# Patient Record
Sex: MALE | Race: WHITE | NOT HISPANIC OR LATINO | Employment: OTHER | URBAN - METROPOLITAN AREA
[De-identification: names, ages, dates, MRNs, and addresses within clinical notes are randomized per-mention and may not be internally consistent; named-entity substitution may affect disease eponyms.]

---

## 2017-12-22 ENCOUNTER — TRANSCRIBE ORDERS (OUTPATIENT)
Dept: ADMINISTRATIVE | Facility: HOSPITAL | Age: 67
End: 2017-12-22

## 2017-12-22 DIAGNOSIS — R06.83 SNORES: Primary | ICD-10-CM

## 2021-12-03 ENCOUNTER — EVALUATION (OUTPATIENT)
Dept: PHYSICAL THERAPY | Facility: CLINIC | Age: 71
End: 2021-12-03
Payer: MEDICARE

## 2021-12-03 DIAGNOSIS — G20 PARKINSON'S DISEASE (HCC): Primary | ICD-10-CM

## 2021-12-03 DIAGNOSIS — R53.1 WEAKNESS: ICD-10-CM

## 2021-12-03 PROCEDURE — 97163 PT EVAL HIGH COMPLEX 45 MIN: CPT | Performed by: PHYSICAL THERAPIST

## 2021-12-07 ENCOUNTER — OFFICE VISIT (OUTPATIENT)
Dept: PHYSICAL THERAPY | Facility: CLINIC | Age: 71
End: 2021-12-07
Payer: MEDICARE

## 2021-12-07 DIAGNOSIS — G20 PARKINSON'S DISEASE (HCC): Primary | ICD-10-CM

## 2021-12-07 PROCEDURE — 97112 NEUROMUSCULAR REEDUCATION: CPT | Performed by: PHYSICAL THERAPIST

## 2021-12-10 ENCOUNTER — OFFICE VISIT (OUTPATIENT)
Dept: PHYSICAL THERAPY | Facility: CLINIC | Age: 71
End: 2021-12-10
Payer: MEDICARE

## 2021-12-10 DIAGNOSIS — G20 PARKINSON'S DISEASE (HCC): Primary | ICD-10-CM

## 2021-12-10 DIAGNOSIS — R53.1 WEAKNESS: ICD-10-CM

## 2021-12-10 PROCEDURE — 97112 NEUROMUSCULAR REEDUCATION: CPT | Performed by: PHYSICAL THERAPIST

## 2021-12-14 ENCOUNTER — APPOINTMENT (OUTPATIENT)
Dept: PHYSICAL THERAPY | Facility: CLINIC | Age: 71
End: 2021-12-14
Payer: MEDICARE

## 2021-12-17 ENCOUNTER — APPOINTMENT (OUTPATIENT)
Dept: PHYSICAL THERAPY | Facility: CLINIC | Age: 71
End: 2021-12-17
Payer: MEDICARE

## 2021-12-21 ENCOUNTER — APPOINTMENT (OUTPATIENT)
Dept: PHYSICAL THERAPY | Facility: CLINIC | Age: 71
End: 2021-12-21
Payer: MEDICARE

## 2021-12-23 ENCOUNTER — APPOINTMENT (OUTPATIENT)
Dept: PHYSICAL THERAPY | Facility: CLINIC | Age: 71
End: 2021-12-23
Payer: MEDICARE

## 2021-12-28 ENCOUNTER — APPOINTMENT (OUTPATIENT)
Dept: PHYSICAL THERAPY | Facility: CLINIC | Age: 71
End: 2021-12-28
Payer: MEDICARE

## 2021-12-30 ENCOUNTER — APPOINTMENT (OUTPATIENT)
Dept: PHYSICAL THERAPY | Facility: CLINIC | Age: 71
End: 2021-12-30
Payer: MEDICARE

## 2022-09-22 ENCOUNTER — OFFICE VISIT (OUTPATIENT)
Dept: OTOLARYNGOLOGY | Facility: CLINIC | Age: 72
End: 2022-09-22
Payer: MEDICARE

## 2022-09-22 VITALS — TEMPERATURE: 97.3 F | WEIGHT: 210 LBS | HEIGHT: 74 IN | BODY MASS INDEX: 26.95 KG/M2

## 2022-09-22 DIAGNOSIS — H93.13 TINNITUS OF BOTH EARS: ICD-10-CM

## 2022-09-22 DIAGNOSIS — H93.8X3 SENSATION OF PLUGGED EAR ON BOTH SIDES: Primary | ICD-10-CM

## 2022-09-22 PROCEDURE — 99204 OFFICE O/P NEW MOD 45 MIN: CPT | Performed by: OTOLARYNGOLOGY

## 2022-09-22 RX ORDER — POLYETHYLENE GLYCOL 3350 17 G/17G
17 POWDER, FOR SOLUTION ORAL DAILY
COMMUNITY

## 2022-09-22 RX ORDER — OXYBUTYNIN CHLORIDE 10 MG/1
10 TABLET, EXTENDED RELEASE ORAL DAILY
COMMUNITY
Start: 2022-09-01

## 2022-09-22 RX ORDER — ATORVASTATIN CALCIUM 10 MG/1
1 TABLET, FILM COATED ORAL DAILY
COMMUNITY
Start: 2022-07-16

## 2022-09-22 RX ORDER — LORATADINE 10 MG/1
10 TABLET ORAL DAILY
COMMUNITY

## 2022-09-22 RX ORDER — FINASTERIDE 5 MG/1
1 TABLET, FILM COATED ORAL DAILY
COMMUNITY
Start: 2022-09-18

## 2022-09-22 RX ORDER — PRUCALOPRIDE 2 MG/1
2 TABLET, FILM COATED ORAL
COMMUNITY

## 2022-09-22 RX ORDER — PANTOPRAZOLE SODIUM 40 MG/1
40 TABLET, DELAYED RELEASE ORAL DAILY
COMMUNITY

## 2022-09-22 RX ORDER — OMEPRAZOLE 20 MG/1
CAPSULE, DELAYED RELEASE ORAL
COMMUNITY
Start: 2022-07-20

## 2022-09-22 NOTE — LETTER
September 22, 2022     Elenita Rae MD  [address] [id number]    Patient: Daniel Duron   YOB: 1950   Date of Visit: 9/22/2022       Dear Dr Stanton Boothe: Thank you for referring Angel Mathew to me for evaluation  Below are my notes for this consultation  If you have questions, please do not hesitate to call me  I look forward to following your patient along with you  Sincerely,        Vipul Chowdary MD        CC: No Recipients  Vipul Chowdary MD  9/22/2022  9:29 AM  Sign when Signing Visit  Assessment/Plan:  Normal external exam; no cerumen nor fluid  Symptoms likely due to minor transient ETD, likely related to his Parkinson's Disease  Audiogram recommended to evaluate tinnitus; pt deferred  F/u PRN  No problem-specific Assessment & Plan notes found for this encounter  Diagnoses and all orders for this visit:    Sensation of plugged ear on both sides    Tinnitus of both ears    Other orders  -     atorvastatin (LIPITOR) 10 mg tablet; Take 1 tablet by mouth in the morning Take 1 tablet by mouth every day at bedtime for 90 days  -     carbidopa-levodopa (SINEMET)  mg per tablet; Take 2 tablets by mouth 3 (three) times a day  -     finasteride (PROSCAR) 5 mg tablet; Take 1 tablet by mouth in the morning  -     omeprazole (PriLOSEC) 20 mg delayed release capsule; TAKE 1 CAPSULE BY ORAL ROUTE EVERY DAY BEFORE MEALS  -     oxybutynin (DITROPAN-XL) 10 MG 24 hr tablet; Take 10 mg by mouth daily  -     Prucalopride Succinate (Motegrity) 2 MG TABS; Take 2 mg by mouth  -     pantoprazole (PROTONIX) 40 mg tablet; Take 40 mg by mouth daily  -     loratadine (CLARITIN) 10 mg tablet; Take 10 mg by mouth daily  -     polyethylene glycol (MIRALAX) 17 g packet; Take 17 g by mouth daily          Subjective:      Patient ID: Daniel Duron is a 67 y o  male  Pt c/o clogged ears, intermittently    He occasionally gets brief episodes of a clogged feeling in the ears, but it doesn't last more than a few minutes  He also notes a 30-year history of ringing in the ears  The following portions of the patient's history were reviewed and updated as appropriate: allergies, current medications, past family history, past medical history, past social history, past surgical history and problem list     Review of Systems      Objective:      Temp (!) 97 3 °F (36 3 °C) (Temporal)   Ht 6' 2" (1 88 m)   Wt 95 3 kg (210 lb)   BMI 26 96 kg/m²          Physical Exam  Constitutional:       Appearance: He is well-developed  HENT:      Head: Normocephalic and atraumatic  Right Ear: Tympanic membrane, ear canal and external ear normal  No drainage  No middle ear effusion  Left Ear: Tympanic membrane, ear canal and external ear normal  No drainage  No middle ear effusion  Nose: Nose normal       Mouth/Throat:      Pharynx: Uvula midline  No oropharyngeal exudate  Tonsils: 0 on the right  0 on the left  Neck:      Thyroid: No thyroid mass or thyromegaly  Trachea: Trachea normal  No tracheal deviation  Lymphadenopathy:      Cervical: No cervical adenopathy  Neurological:      Mental Status: He is alert

## 2022-09-22 NOTE — PROGRESS NOTES
Assessment/Plan:  Normal external exam; no cerumen nor fluid  Symptoms likely due to minor transient ETD, likely related to his Parkinson's Disease  Audiogram recommended to evaluate tinnitus; pt deferred  F/u PRN  No problem-specific Assessment & Plan notes found for this encounter  Diagnoses and all orders for this visit:    Sensation of plugged ear on both sides    Tinnitus of both ears    Other orders  -     atorvastatin (LIPITOR) 10 mg tablet; Take 1 tablet by mouth in the morning Take 1 tablet by mouth every day at bedtime for 90 days  -     carbidopa-levodopa (SINEMET)  mg per tablet; Take 2 tablets by mouth 3 (three) times a day  -     finasteride (PROSCAR) 5 mg tablet; Take 1 tablet by mouth in the morning  -     omeprazole (PriLOSEC) 20 mg delayed release capsule; TAKE 1 CAPSULE BY ORAL ROUTE EVERY DAY BEFORE MEALS  -     oxybutynin (DITROPAN-XL) 10 MG 24 hr tablet; Take 10 mg by mouth daily  -     Prucalopride Succinate (Motegrity) 2 MG TABS; Take 2 mg by mouth  -     pantoprazole (PROTONIX) 40 mg tablet; Take 40 mg by mouth daily  -     loratadine (CLARITIN) 10 mg tablet; Take 10 mg by mouth daily  -     polyethylene glycol (MIRALAX) 17 g packet; Take 17 g by mouth daily          Subjective:      Patient ID: Bobbi Kraus is a 67 y o  male  Pt c/o clogged ears, intermittently  He occasionally gets brief episodes of a clogged feeling in the ears, but it doesn't last more than a few minutes  He also notes a 30-year history of ringing in the ears        The following portions of the patient's history were reviewed and updated as appropriate: allergies, current medications, past family history, past medical history, past social history, past surgical history and problem list     Review of Systems      Objective:      Temp (!) 97 3 °F (36 3 °C) (Temporal)   Ht 6' 2" (1 88 m)   Wt 95 3 kg (210 lb)   BMI 26 96 kg/m²          Physical Exam  Constitutional:       Appearance: He is well-developed  HENT:      Head: Normocephalic and atraumatic  Right Ear: Tympanic membrane, ear canal and external ear normal  No drainage  No middle ear effusion  Left Ear: Tympanic membrane, ear canal and external ear normal  No drainage  No middle ear effusion  Nose: Nose normal       Mouth/Throat:      Pharynx: Uvula midline  No oropharyngeal exudate  Tonsils: 0 on the right  0 on the left  Neck:      Thyroid: No thyroid mass or thyromegaly  Trachea: Trachea normal  No tracheal deviation  Lymphadenopathy:      Cervical: No cervical adenopathy  Neurological:      Mental Status: He is alert

## 2022-11-18 ENCOUNTER — NEW PATIENT COMPREHENSIVE (OUTPATIENT)
Dept: URBAN - METROPOLITAN AREA CLINIC 48 | Facility: CLINIC | Age: 72
End: 2022-11-18

## 2022-11-18 DIAGNOSIS — H02.831: ICD-10-CM

## 2022-11-18 DIAGNOSIS — H52.13: ICD-10-CM

## 2022-11-18 DIAGNOSIS — H18.603: ICD-10-CM

## 2022-11-18 DIAGNOSIS — H43.812: ICD-10-CM

## 2022-11-18 DIAGNOSIS — H04.123: ICD-10-CM

## 2022-11-18 DIAGNOSIS — H02.834: ICD-10-CM

## 2022-11-18 DIAGNOSIS — H25.13: ICD-10-CM

## 2022-11-18 PROCEDURE — 92025 CPTRIZED CORNEAL TOPOGRAPHY: CPT

## 2022-11-18 PROCEDURE — 92015 DETERMINE REFRACTIVE STATE: CPT

## 2022-11-18 PROCEDURE — 99204 OFFICE O/P NEW MOD 45 MIN: CPT

## 2022-11-18 ASSESSMENT — VISUAL ACUITY
OS_CC: 20/50-1
OS_CC: 20/25
OS_SC: 20/300
OD_SC: 20/50-2
OD_PH: 20/25-1
OD_CC: 20/40-1
OD_CC: 20/25-1

## 2022-11-18 ASSESSMENT — KERATOMETRY
OD_AXISANGLE_DEGREES: 142
OS_AXISANGLE_DEGREES: 180
OD_K1POWER_DIOPTERS: 43.00
OD_K2POWER_DIOPTERS: 46.50
OS_AXISANGLE2_DEGREES: 90
OS_K1POWER_DIOPTERS: 41.50
OD_AXISANGLE2_DEGREES: 52
OS_K2POWER_DIOPTERS: 43.75

## 2022-11-18 ASSESSMENT — TONOMETRY
OS_IOP_MMHG: 12
OD_IOP_MMHG: 11

## 2022-12-01 ENCOUNTER — PROBLEM (OUTPATIENT)
Dept: URBAN - METROPOLITAN AREA CLINIC 48 | Facility: CLINIC | Age: 72
End: 2022-12-01

## 2022-12-01 DIAGNOSIS — H52.13: ICD-10-CM

## 2022-12-01 DIAGNOSIS — H53.2: ICD-10-CM

## 2022-12-01 DIAGNOSIS — H04.123: ICD-10-CM

## 2022-12-01 PROCEDURE — 92060 SENSORIMOTOR EXAMINATION: CPT

## 2022-12-01 PROCEDURE — 92014 COMPRE OPH EXAM EST PT 1/>: CPT

## 2022-12-01 PROCEDURE — 92015 DETERMINE REFRACTIVE STATE: CPT

## 2022-12-01 ASSESSMENT — KERATOMETRY
OD_K1POWER_DIOPTERS: 43.00
OD_AXISANGLE_DEGREES: 142
OS_AXISANGLE2_DEGREES: 90
OS_K2POWER_DIOPTERS: 43.75
OS_AXISANGLE_DEGREES: 180
OD_AXISANGLE2_DEGREES: 52
OS_K1POWER_DIOPTERS: 41.50
OD_K2POWER_DIOPTERS: 46.50

## 2022-12-01 ASSESSMENT — TONOMETRY
OS_IOP_MMHG: 12
OD_IOP_MMHG: 12

## 2022-12-01 ASSESSMENT — VISUAL ACUITY
OD_CC: 20/30-2
OS_CC: 20/50-1

## 2024-10-17 ENCOUNTER — OFFICE VISIT (OUTPATIENT)
Dept: OTOLARYNGOLOGY | Facility: CLINIC | Age: 74
End: 2024-10-17
Payer: MEDICARE

## 2024-10-17 ENCOUNTER — OFFICE VISIT (OUTPATIENT)
Dept: AUDIOLOGY | Facility: CLINIC | Age: 74
End: 2024-10-17
Payer: MEDICARE

## 2024-10-17 VITALS — BODY MASS INDEX: 25.84 KG/M2 | HEIGHT: 73 IN | TEMPERATURE: 97.9 F | WEIGHT: 195 LBS

## 2024-10-17 DIAGNOSIS — H91.93 DECREASED HEARING, BILATERAL: ICD-10-CM

## 2024-10-17 DIAGNOSIS — J34.2 DNS (DEVIATED NASAL SEPTUM): ICD-10-CM

## 2024-10-17 DIAGNOSIS — H69.03 PATULOUS EUSTACHIAN TUBE OF BOTH EARS: ICD-10-CM

## 2024-10-17 DIAGNOSIS — H90.3 SENSORINEURAL HEARING LOSS (SNHL) OF BOTH EARS: ICD-10-CM

## 2024-10-17 DIAGNOSIS — H90.3 SENSORINEURAL HEARING LOSS, BILATERAL: Primary | ICD-10-CM

## 2024-10-17 DIAGNOSIS — R09.81 CHRONIC NASAL CONGESTION: Primary | ICD-10-CM

## 2024-10-17 PROCEDURE — 92557 COMPREHENSIVE HEARING TEST: CPT | Performed by: AUDIOLOGIST

## 2024-10-17 PROCEDURE — 31231 NASAL ENDOSCOPY DX: CPT | Performed by: OTOLARYNGOLOGY

## 2024-10-17 PROCEDURE — 99214 OFFICE O/P EST MOD 30 MIN: CPT | Performed by: OTOLARYNGOLOGY

## 2024-10-17 PROCEDURE — 92567 TYMPANOMETRY: CPT | Performed by: AUDIOLOGIST

## 2024-10-17 RX ORDER — MIRABEGRON 50 MG/1
50 TABLET, FILM COATED, EXTENDED RELEASE ORAL DAILY
COMMUNITY

## 2024-10-17 RX ORDER — ALENDRONATE SODIUM 70 MG/1
TABLET ORAL
COMMUNITY
Start: 2024-10-09

## 2024-10-17 NOTE — LETTER
"October 17, 2024     Apolinar Carr MD  [address]  [address]  [facility] [address]    Patient: Femi Acosta   YOB: 1950   Date of Visit: 10/17/2024       Dear Dr. Carr:    Thank you for referring Femi Acosta to me for evaluation. Below are my notes for this consultation.    If you have questions, please do not hesitate to call me. I look forward to following your patient along with you.         Sincerely,        Parminder Agarwal MD        CC: No Recipients    Parminder Agarwal MD  10/17/2024 11:29 AM  Sign when Signing Visit  Assessment/Plan:  The patient's nasal congestion is mainly orthostatic.  I recommended elevating the head of the bed.  He has bilateral SNHL; Hearing Aid Evaluation ordered.  He likely has a patulous eustachian tube on the right, causing the intermittent echoing of voice.      Diagnosis ICD-10-CM Associated Orders   1. Chronic nasal congestion  R09.81       2. DNS (deviated nasal septum)  J34.2       3. Patulous eustachian tube of both ears  H69.03 Ambulatory referral to Audiology      4. Decreased hearing, bilateral  H91.93 Ambulatory referral to Audiology             Subjective:      Patient ID: Femi Acosta is a 74 y.o. male.    Pt with c/o worsening nasal congestion, especially during the night.  He has been using Neosynephrine nasal spray for relief.  He also c/o intermittently feeling a hollowness in his ears, associated with an echoing of his own voice.  He also c/o decreased hearing.        The following portions of the patient's history were reviewed and updated as appropriate: allergies, current medications, past family history, past medical history, past social history, past surgical history and problem list.    Review of Systems      Objective:      Temp 97.9 °F (36.6 °C) (Temporal)   Ht 6' 1\" (1.854 m)   Wt 88.5 kg (195 lb)   BMI 25.73 kg/m²          Physical Exam  Constitutional:       Appearance: He is well-developed.   HENT:      Head: " Normocephalic and atraumatic.      Right Ear: Tympanic membrane, ear canal and external ear normal. No drainage. No middle ear effusion.      Left Ear: Tympanic membrane, ear canal and external ear normal. No drainage.  No middle ear effusion.      Nose: Septal deviation present.      Mouth/Throat:      Pharynx: Uvula midline. No oropharyngeal exudate.      Tonsils: 0 on the right. 0 on the left.   Neck:      Thyroid: No thyroid mass or thyromegaly.      Trachea: Trachea normal. No tracheal deviation.   Lymphadenopathy:      Cervical: No cervical adenopathy.   Neurological:      Mental Status: He is alert.         Flexible Fiberoptic Nasal Endoscopy Procedure Note:  Indication:  nasal congestion  Verbal consent obtained.  Surgeon: Parminder Agarwal MD  Anesthesia: 4% lidocaine, oxymetazoline  Scope passed through nasal cavities bilaterally.  Nasopharynx: normal  Right Nasal Cavity:   Mucosa: normal   Secretions: clear  Left Nasal Cavity:   Mucosa: normal   Secretions: clear  Other findings = DNS  Patient tolerated procedure well without complications    Audiogram:  AD: mild to mod HF SNHL  AS: mild to mod HF SNHL  Tympanogram:  AD: type A  AS: type A

## 2024-10-17 NOTE — PROGRESS NOTES
ENT HEARING EVALUATION    Name:  Femi Acosta  :  1950  Age:  74 y.o.   MRN:  [id number]  Date of Evaluation: 10/17/24     HISTORY:    Reason for visit:  Difficulty hearing high frequency consonant sounds     Femi Acosta is being seen today for an evaluation of hearing as part of their ENT visit.     EVALUATION:    Otoscopy revealed clear canals bilaterally     Tympanometry:   Right Ear: Type A; normal middle ear pressure and static compliance    Left Ear: Type A; normal middle ear pressure and static compliance     Speech Audiometry:  Speech Reception (SRT)   Right Ear: 10 dB HL   Left Ear: 15 dB HL    Word Recognition Scores (WRS):  Right Ear: good (85 % correct)     Left Ear: good (90 % correct)   Stimuli: NU-6    Pure Tone Audiometry:  Conventional pure tone audiometry from 250 - 8000 Hz  was obtained with good reliability and revealed the following:     Right Ear:  Normal 250-1500 Hz sloping sharply from a mild to moderate SHL, 9394-1032 Hz    Left Ear:    Normal 250-2000 Hz sloping sharply to a moderate to severe SHL, 3047-7385 Hz     *see attached audiogram for configuration     IMPRESSIONS:   Discussed the results with the patient and his wife / the loss of the high frequencies was discussed / his wife does have a soft, high frequency voice with an accent.   We briefly discussed how this could impact his ability to hear her well.       RECOMMENDATIONS:  Follow up per Dr. Agarwal   Consider a Hearing aid evaluation to discuss options available / copies of audiograms provided to the patient's wife   Annual hearing evaluations for monitoring purposes     Barrera Isabel, CCC-A  Clinical Audiologist  [id number]  [phone]

## 2024-10-17 NOTE — PROGRESS NOTES
"Assessment/Plan:  The patient's nasal congestion is mainly orthostatic.  I recommended elevating the head of the bed.  He has bilateral SNHL; Hearing Aid Evaluation ordered.  He likely has a patulous eustachian tube on the right, causing the intermittent echoing of voice.      Diagnosis ICD-10-CM Associated Orders   1. Chronic nasal congestion  R09.81       2. DNS (deviated nasal septum)  J34.2       3. Patulous eustachian tube of both ears  H69.03 Ambulatory referral to Audiology      4. Decreased hearing, bilateral  H91.93 Ambulatory referral to Audiology             Subjective:      Patient ID: Femi Acosta is a 74 y.o. male.    Pt with c/o worsening nasal congestion, especially during the night.  He has been using Neosynephrine nasal spray for relief.  He also c/o intermittently feeling a hollowness in his ears, associated with an echoing of his own voice.  He also c/o decreased hearing.        The following portions of the patient's history were reviewed and updated as appropriate: allergies, current medications, past family history, past medical history, past social history, past surgical history and problem list.    Review of Systems      Objective:      Temp 97.9 °F (36.6 °C) (Temporal)   Ht 6' 1\" (1.854 m)   Wt 88.5 kg (195 lb)   BMI 25.73 kg/m²          Physical Exam  Constitutional:       Appearance: He is well-developed.   HENT:      Head: Normocephalic and atraumatic.      Right Ear: Tympanic membrane, ear canal and external ear normal. No drainage. No middle ear effusion.      Left Ear: Tympanic membrane, ear canal and external ear normal. No drainage.  No middle ear effusion.      Nose: Septal deviation present.      Mouth/Throat:      Pharynx: Uvula midline. No oropharyngeal exudate.      Tonsils: 0 on the right. 0 on the left.   Neck:      Thyroid: No thyroid mass or thyromegaly.      Trachea: Trachea normal. No tracheal deviation.   Lymphadenopathy:      Cervical: No cervical adenopathy. "   Neurological:      Mental Status: He is alert.         Flexible Fiberoptic Nasal Endoscopy Procedure Note:  Indication:  nasal congestion  Verbal consent obtained.  Surgeon: Parminder Agarwal MD  Anesthesia: 4% lidocaine, oxymetazoline  Scope passed through nasal cavities bilaterally.  Nasopharynx: normal  Right Nasal Cavity:   Mucosa: normal   Secretions: clear  Left Nasal Cavity:   Mucosa: normal   Secretions: clear  Other findings = DNS  Patient tolerated procedure well without complications    Audiogram:  AD: mild to mod HF SNHL  AS: mild to mod HF SNHL  Tympanogram:  AD: type A  AS: type A

## 2024-11-18 ENCOUNTER — ESTABLISHED COMPREHENSIVE EXAM (OUTPATIENT)
Dept: URBAN - METROPOLITAN AREA CLINIC 48 | Facility: CLINIC | Age: 74
End: 2024-11-18

## 2024-11-18 DIAGNOSIS — H40.012: ICD-10-CM

## 2024-11-18 DIAGNOSIS — H43.812: ICD-10-CM

## 2024-11-18 DIAGNOSIS — H50.10: ICD-10-CM

## 2024-11-18 DIAGNOSIS — H25.813: ICD-10-CM

## 2024-11-18 DIAGNOSIS — H02.831: ICD-10-CM

## 2024-11-18 DIAGNOSIS — H53.2: ICD-10-CM

## 2024-11-18 DIAGNOSIS — H02.834: ICD-10-CM

## 2024-11-18 DIAGNOSIS — H16.223: ICD-10-CM

## 2024-11-18 PROCEDURE — 92014 COMPRE OPH EXAM EST PT 1/>: CPT

## 2024-11-18 ASSESSMENT — VISUAL ACUITY
OD_CC: 20/70-1
OS_CC: 20/50
OD_CC: 20/30
OS_SC: 20/400
OS_PH: 20/50
OD_SC: 20/150
OS_CC: 20/70-1
OD_PH: 20/30-2

## 2024-11-18 ASSESSMENT — TONOMETRY
OS_IOP_MMHG: 12
OD_IOP_MMHG: 13

## 2024-11-18 ASSESSMENT — KERATOMETRY
OD_AXISANGLE2_DEGREES: 52
OS_K2POWER_DIOPTERS: 43.75
OS_AXISANGLE2_DEGREES: 90
OS_AXISANGLE_DEGREES: 180
OD_K2POWER_DIOPTERS: 46.50
OD_AXISANGLE_DEGREES: 142
OS_K1POWER_DIOPTERS: 41.50
OD_K1POWER_DIOPTERS: 43.00

## 2024-12-03 ENCOUNTER — OFFICE VISIT (OUTPATIENT)
Dept: AUDIOLOGY | Facility: CLINIC | Age: 74
End: 2024-12-03

## 2024-12-03 DIAGNOSIS — H90.3 SENSORINEURAL HEARING LOSS, BILATERAL: Primary | ICD-10-CM

## 2024-12-03 NOTE — PROGRESS NOTES
Hearing Aid Evaluation  Name:  Femi Acosta  :  1950  Age:  74 y.o.  MRN:  [id number]  Date of Evaluation: 24     HISTORY:    Femi Acosta was seen today for a hearing aid evaluation following his audiometric testing performed on 10/17/2024. Femi was initially referred by Dr. Any EL.   He was accompanied today by his wife.  She discussed concerns regarding his difficulties hearing her voice and TV.   He is also diagnosed with Parkinson's.       RESULTS REVIEW:    The audiometric findings were reviewed with the patient and his wife. All of the patient's questions regarding his hearing status were addressed, and the importance of realistic expectations of hearing loss and amplification were discussed.   Central processing of speech and factors of distance and noise were also discussed at length.       DEVICE REVIEW & RECOMMENDATION:     Strengths and limitations of amplification, including various hearing aid styles, technology, options, and accessories were discussed at length with patient and his wife. Hearing aids are assistive devices and are not designed to restore normal hearing. The patient was counseled on the importance of self-advocacy, motivation, as well as effective communication strategies that can be used to optimize hearing aid success. Based on the degree of his hearing loss, preferences, and lifestyle needs, the following hearing recommendations were made:    The first hearing aid recommendation is Oticon Zircon 2 mini R ($1500).  The second hearing aid recommendation is Oticon Intent 4 mini R ($3200).    The patient demo'd, in office, the Intent 1 devices.   It was discussed, at length, the differences in tech level, channels and sound quality of the 24 channel vs. The 10 or 12 channels of the other devices discussed.  He was able to tell the difference with the aids on vs. Off.       [facility]'s office policies regarding our hearing aid program were reviewed, including the 45  "day trial period, non-refundable return fees, as well as the  warranties and service plan. Hearing aid cost, and payment, as well as insurance benefit (if applicable) were discussed with the patient.     DEVICE SELECTION:    At this time, the couple wishes to defer the purchase of hearing aids.   Mrs. Acosta wishes to check into different service models and \"try other devices\".       They will be traveling to [facility], in January, for 3 months.   The quote sheet, provided to them, is valid until 6/3/2025.       Barrera Isabel, CCC-A  Clinical Audiologist  [id number]  [facility] AUDIOLOG[facility]  [address] [facility] [address]  "

## 2024-12-12 ENCOUNTER — EVALUATION (OUTPATIENT)
Facility: CLINIC | Age: 74
End: 2024-12-12
Payer: MEDICARE

## 2024-12-12 DIAGNOSIS — G20.A1 PARKINSON'S DISEASE WITHOUT FLUCTUATING MANIFESTATIONS, UNSPECIFIED WHETHER DYSKINESIA PRESENT (HCC): Primary | ICD-10-CM

## 2024-12-12 PROCEDURE — 97166 OT EVAL MOD COMPLEX 45 MIN: CPT

## 2024-12-12 NOTE — LETTER
2024    Apolinar Carr MD  [address]  [address]  [facility] [address]    Patient: Femi Acosta   YOB: 1950   Date of Visit: 2024     Encounter Diagnosis     ICD-10-CM    1. Parkinson's disease without fluctuating manifestations, unspecified whether dyskinesia present (HCC)  G20.A1           Dear Dr. Carr:    Thank you for your recent referral of Femi Acosta. Please review the attached evaluation summary from Femi's recent visit.     Please verify that you agree with the plan of care by signing the attached order.     If you have any questions or concerns, please do not hesitate to call.     I sincerely appreciate the opportunity to share in the care of one of your patients and hope to have another opportunity to work with you in the near future.     Sincerely,    Osmar Jarvis, OT      Referring Provider:     I certify that I have read the below Plan of Care and certify the need for these services furnished under this plan of treatment while under my care.                    Apolinar Carr MD  [address]  [address]  [facility] [address]  Via Fax: [phone]          Today's Date: 2024  Patient Name: Femi Acosta  : 1950  MRN: [id number]  Referring Provider: No ref. provider found  Dx: Parkinson's disease without fluctuating manifestations, unspecified whether dyskinesia present (HCC) [G20.A1]    Active Problem List: There is no problem list on file for this patient.    Past Medical Hx:   Past Medical History:   Diagnosis Date   • Allergic rhinitis    • Constipation    • GERD (gastroesophageal reflux disease)    • Hyperlipidemia    • Overactive bladder    • Parkinson disease (HCC)    • Prostate enlargement      Past Surgical Hx:   Past Surgical History:   Procedure Laterality Date   • ANKLE FRACTURE SURGERY     • CYST REMOVAL            Eval/ Re-eval POC expires FOTO Auth #/ Referral # Total units  Start date  Expiration date Extension                                                              Date               Units:  Used               Authed:  Remaining                   SKILLED ANALYSIS:  Pt is a 74 y.o. male referred to Occupational Therapy with diagnosis of Parkinson's disease without fluctuating manifestations, unspecified whether dyskinesia present (HCC) [G20.A1].  Pt completed the following assessments today: ROM, MMT, MoCA, 9-hole peg test, and  strength testing. Pt presents with bradykinesia and rigidity due to effects of Parkinson's Disease and demonstrating difficulty in completing ADLs IADLs, lawn care, and leisure activities. Pt demonstrating the following impairments: decreased UE strength, UE coordination, mobility, FMC, and functional cognition and participating in his life tasks. Pt would benefit from OT services focusing on impairments for the next 12 weeks. Pt educated on OT services and     Please complete physical performance test in future session. Assess functional endurance required for RSB program, which the pt is very interested in.     Subjective    Occupational Profile:   Pt is a 74 year old retired . He enjoys to work outside and garden. Currently the pt having difficulty with writing, putting on clothing, working in his garden, performing lawn work and computer/cell phone usage. Over the past year or two the pt also states he has had more difficulty maintaining a conversation, as he loses his train of thought easily.    PATIENT GOAL: Improve writing, endurance, cognition, engage in RSB program.     HISTORY OF PRESENT ILLNESS:     Pt is a 74 y.o. male who was referred to Occupational Therapy s/p  Parkinson's disease without fluctuating manifestations, unspecified whether dyskinesia present (HCC) [G20.A1].     PMH:   Past Medical History:   Diagnosis Date   • Allergic rhinitis    • Constipation    • GERD (gastroesophageal reflux disease)    • Hyperlipidemia    • Overactive bladder    • Parkinson disease (HCC)    •  Prostate enlargement        Past Surgical Hx:   Past Surgical History:   Procedure Laterality Date   • ANKLE FRACTURE SURGERY     • CYST REMOVAL       PLAN OF CARE START:24  PLAN OF CARE END: 3/13/25  FREQUENCY: 2-3x/week  Precautions: FALL SAFETY,     OBJECTIVE   9 HOLE PEG TEST: performed 9 hole peg test to assess dexterity/fine motor coordination with pt scoring 108 seconds on R hand  and 90 seconds on L hand  side. Pt demonstrating decreased FMC related to age-related norms     [place] Cognitive Assessment Version 8.1 (MoCA V8.1)  Visuospatial/executive functionin/5  Namin/3  Memory: 1st trial:  , 2nd trial:  3/5  Attention/concentration:   List of letters: 0/1  Seial Seven Subtraction:  2/3   Language/sentence repetition:    Language Fluency:  0/1  Abstract/Correlational Thinkin  Delayed Recall:    Orientation:                Memory Index Score: 8/15  MoCA V1 8.1 Raw Score:  16/30, MIS:  8/15, indicative of MODERATE neurocognitive impairments.          UE Strength:              MALCOM: RUE 92 lbs LUE: 95 lbs  The age norm is approximately 55-65 lbs and indicating normal  strength  Pt is R hand dominant      Range of Motion:  AROM:   BUE within normal limits     MMT:  R UE: 4+/5 in all pivots      L UE 4+/5 in all pivots      Pt is L hand dominant    GOALS:   Short Term Goals: 4 weeks   Pt will increase delayed recall and recall 2/5 items on MOCA to complete IADLs  Pt will demonstrate improved functional cognition as evidenced by improving score on the MoCA to 19/30 to improve performance during ADLs and IADLs  Pt will increase FMC by 10 seconds bilaterally on 9 hole peg to complete ADLs and IADLs   Pt will demonstrate independence with UE strengthening HEP as per patient report    Long Term Goals: 3 months   Pt will improve functional endurance to engage in daily tasks or yard works without complaints of fatigue.   Pt will will improve social participation and  engage in the RSB program on a weekly basis to assist with functional mobility and endurance.   Pt will improve performance with fasteners for clothing management as per patient report.  Pt will improve UE strength to 5/5 in all planes to improve performance with ADLs/IADLs

## 2024-12-12 NOTE — LETTER
2024    Apolinar Carr MD  [address]  [address]  [facility] [address]    Patient: Femi Acosta   YOB: 1950   Date of Visit: 2024     Encounter Diagnosis     ICD-10-CM    1. Parkinson's disease without fluctuating manifestations, unspecified whether dyskinesia present (HCC)  G20.A1           Dear Dr. Carr:    Thank you for your recent referral of Femi Acosta. Please review the attached evaluation summary from Femi's recent visit.     Please verify that you agree with the plan of care by signing the attached order.     If you have any questions or concerns, please do not hesitate to call.     I sincerely appreciate the opportunity to share in the care of one of your patients and hope to have another opportunity to work with you in the near future.     Sincerely,    Osmar Jarvis, OT      Referring Provider:     I certify that I have read the below Plan of Care and certify the need for these services furnished under this plan of treatment while under my care.                    Apolinar Carr MD  [address]  [address]  [facility] [address]  Via Fax: [phone]          Today's Date: 2024  Patient Name: Femi Acosta  : 1950  MRN: [id number]  Referring Provider: No ref. provider found  Dx: Parkinson's disease without fluctuating manifestations, unspecified whether dyskinesia present (HCC) [G20.A1]    Active Problem List: There is no problem list on file for this patient.    Past Medical Hx:   Past Medical History:   Diagnosis Date   • Allergic rhinitis    • Constipation    • GERD (gastroesophageal reflux disease)    • Hyperlipidemia    • Overactive bladder    • Parkinson disease (HCC)    • Prostate enlargement      Past Surgical Hx:   Past Surgical History:   Procedure Laterality Date   • ANKLE FRACTURE SURGERY     • CYST REMOVAL            Eval/ Re-eval POC expires FOTO Auth #/ Referral # Total units  Start date  Expiration date Extension                                                              Date               Units:  Used               Authed:  Remaining                   SKILLED ANALYSIS:  Pt is a 74 y.o. male referred to Occupational Therapy with diagnosis of Parkinson's disease without fluctuating manifestations, unspecified whether dyskinesia present (HCC) [G20.A1].  Pt completed the following assessments today: ROM, MMT, MoCA, 9-hole peg test, and  strength testing. Pt presents with bradykinesia and rigidity due to effects of Parkinson's Disease and demonstrating difficulty in completing ADLs IADLs, lawn care, and leisure activities. Pt demonstrating the following impairments: decreased UE strength, UE coordination, mobility, FMC, and functional cognition and participating in his life tasks. Pt would benefit from OT services focusing on impairments for the next 12 weeks. Pt educated on OT services and     Please complete physical performance test in future session. Assess functional endurance required for RSB program, which the pt is very interested in.     Subjective    Occupational Profile:   Pt is a 74 year old retired . He enjoys to work outside and garden. Currently the pt having difficulty with writing, putting on clothing, working in his garden, performing lawn work and computer/cell phone usage. Over the past year or two the pt also states he has had more difficulty maintaining a conversation, as he loses his train of thought easily.    PATIENT GOAL: Improve writing, endurance, cognition, engage in RSB program.     HISTORY OF PRESENT ILLNESS:     Pt is a 74 y.o. male who was referred to Occupational Therapy s/p  Parkinson's disease without fluctuating manifestations, unspecified whether dyskinesia present (HCC) [G20.A1].     PMH:   Past Medical History:   Diagnosis Date   • Allergic rhinitis    • Constipation    • GERD (gastroesophageal reflux disease)    • Hyperlipidemia    • Overactive bladder    • Parkinson disease (HCC)    •  Prostate enlargement        Past Surgical Hx:   Past Surgical History:   Procedure Laterality Date   • ANKLE FRACTURE SURGERY     • CYST REMOVAL       PLAN OF CARE START:24  PLAN OF CARE END: 3/13/25  FREQUENCY: 2x/week  Precautions: FALL SAFETY,     OBJECTIVE   9 HOLE PEG TEST: performed 9 hole peg test to assess dexterity/fine motor coordination with pt scoring 108 seconds on R hand  and 90 seconds on L hand  side. Pt demonstrating decreased FMC related to age-related norms     [place] Cognitive Assessment Version 8.1 (MoCA V8.1)  Visuospatial/executive functionin/5  Namin/3  Memory: 1st trial:  , 2nd trial:  3/5  Attention/concentration:   List of letters: 0/1  Seial Seven Subtraction:  2/3   Language/sentence repetition:    Language Fluency:  0/1  Abstract/Correlational Thinkin/2  Delayed Recall:    Orientation:                Memory Index Score: 8/15  MoCA V1 8.1 Raw Score:  16/30, MIS:  8/15, indicative of MODERATE neurocognitive impairments.          UE Strength:              MALCOM: RUE 92 lbs LUE: 95 lbs  The age norm is approximately 55-65 lbs and indicating normal  strength  Pt is R hand dominant      Range of Motion:  AROM:   BUE within normal limits     MMT:  R UE: 4+/5 in all pivots      L UE 4+/5 in all pivots      Pt is L hand dominant    GOALS:   Short Term Goals: 4 weeks   Pt will increase delayed recall and recall 2/5 items on MOCA to complete IADLs  Pt will demonstrate improved functional cognition as evidenced by improving score on the MoCA to 19/30 to improve performance during ADLs and IADLs  Pt will increase FMC by 10 seconds bilaterally on 9 hole peg to complete ADLs and IADLs   Pt will demonstrate independence with UE strengthening HEP as per patient report    Long Term Goals: 3 months   Pt will improve functional endurance to engage in daily tasks or yard works without complaints of fatigue.   Pt will will improve social participation and  engage in the RSB program on a weekly basis to assist with functional mobility and endurance.   Pt will improve performance with fasteners for clothing management as per patient report.  Pt will improve UE strength to 5/5 in all planes to improve performance with ADLs/IADLs

## 2024-12-12 NOTE — PROGRESS NOTES
Today's Date: 2024  Patient Name: Femi Acosta  : 1950  MRN: [id number]  Referring Provider: No ref. provider found  Dx: Parkinson's disease without fluctuating manifestations, unspecified whether dyskinesia present (HCC) [G20.A1]    Active Problem List: There is no problem list on file for this patient.    Past Medical Hx:   Past Medical History:   Diagnosis Date    Allergic rhinitis     Constipation     GERD (gastroesophageal reflux disease)     Hyperlipidemia     Overactive bladder     Parkinson disease (HCC)     Prostate enlargement      Past Surgical Hx:   Past Surgical History:   Procedure Laterality Date    ANKLE FRACTURE SURGERY      CYST REMOVAL            Eval/ Re-eval POC expires FOTO Auth #/ Referral # Total units  Start date  Expiration date Extension                                                             Date               Units:  Used               Authed:  Remaining                   SKILLED ANALYSIS:  Pt is a 74 y.o. male referred to Occupational Therapy with diagnosis of Parkinson's disease without fluctuating manifestations, unspecified whether dyskinesia present (HCC) [G20.A1].  Pt completed the following assessments today: ROM, MMT, MoCA, 9-hole peg test, and  strength testing. Pt presents with bradykinesia and rigidity due to effects of Parkinson's Disease and demonstrating difficulty in completing ADLs IADLs, lawn care, and leisure activities. Pt demonstrating the following impairments: decreased UE strength, UE coordination, mobility, FMC, and functional cognition and participating in his life tasks. Pt would benefit from OT services focusing on impairments for the next 12 weeks. Pt educated on OT services and     Please complete physical performance test in future session. Assess functional endurance required for RSB program, which the pt is very interested in.     Subjective    Occupational Profile:   Pt is a 74 year old retired . He enjoys to work  outside and garden. Currently the pt having difficulty with writing, putting on clothing, working in his garden, performing lawn work and computer/cell phone usage. Over the past year or two the pt also states he has had more difficulty maintaining a conversation, as he loses his train of thought easily.    PATIENT GOAL: Improve writing, endurance, cognition, engage in RSB program.     HISTORY OF PRESENT ILLNESS:     Pt is a 74 y.o. male who was referred to Occupational Therapy s/p  Parkinson's disease without fluctuating manifestations, unspecified whether dyskinesia present (HCC) [G20.A1].     PMH:   Past Medical History:   Diagnosis Date    Allergic rhinitis     Constipation     GERD (gastroesophageal reflux disease)     Hyperlipidemia     Overactive bladder     Parkinson disease (HCC)     Prostate enlargement        Past Surgical Hx:   Past Surgical History:   Procedure Laterality Date    ANKLE FRACTURE SURGERY  1975    CYST REMOVAL       PLAN OF CARE START:24  PLAN OF CARE END: 3/13/25  FREQUENCY: 2-3x/week  Precautions: FALL SAFETY,     OBJECTIVE   9 HOLE PEG TEST: performed 9 hole peg test to assess dexterity/fine motor coordination with pt scoring 108 seconds on R hand  and 90 seconds on L hand  side. Pt demonstrating decreased FMC related to age-related norms     [place] Cognitive Assessment Version 8.1 (MoCA V8.1)  Visuospatial/executive functionin/5  Namin/3  Memory: 1st trial:  4/5, 2nd trial:  3/5  Attention/concentration: 2/2  List of letters: 0/1  Seial Seven Subtraction:  2/3   Language/sentence repetition:  1/2  Language Fluency:  0/1  Abstract/Correlational Thinkin/2  Delayed Recall:  1/5  Orientation:  5/6              Memory Index Score: 15  MoCA V1 8.1 Raw Score:  16/30, MIS:  8/15, indicative of MODERATE neurocognitive impairments.          UE Strength:              MALCOM: RUE 92 lbs LUE: 95 lbs  The age norm is approximately 55-65 lbs and indicating normal   strength  Pt is R hand dominant      Range of Motion:  AROM:   BUE within normal limits     MMT:  R UE: 4+/5 in all pivots      L UE 4+/5 in all pivots      Pt is L hand dominant    GOALS:   Short Term Goals: 4 weeks   Pt will increase delayed recall and recall 2/5 items on MOCA to complete IADLs  Pt will demonstrate improved functional cognition as evidenced by improving score on the MoCA to 19/30 to improve performance during ADLs and IADLs  Pt will increase FMC by 10 seconds bilaterally on 9 hole peg to complete ADLs and IADLs   Pt will demonstrate independence with UE strengthening HEP as per patient report    Long Term Goals: 3 months   Pt will improve functional endurance to engage in daily tasks or yard works without complaints of fatigue.   Pt will will improve social participation and engage in the RSB program on a weekly basis to assist with functional mobility and endurance.   Pt will improve performance with fasteners for clothing management as per patient report.  Pt will improve UE strength to 5/5 in all planes to improve performance with ADLs/IADLs

## 2024-12-13 ENCOUNTER — OFFICE VISIT (OUTPATIENT)
Facility: CLINIC | Age: 74
End: 2024-12-13
Payer: MEDICARE

## 2024-12-13 DIAGNOSIS — G20.A1 PARKINSON'S DISEASE WITHOUT FLUCTUATING MANIFESTATIONS, UNSPECIFIED WHETHER DYSKINESIA PRESENT (HCC): Primary | ICD-10-CM

## 2024-12-13 PROCEDURE — 97112 NEUROMUSCULAR REEDUCATION: CPT

## 2024-12-13 PROCEDURE — 97530 THERAPEUTIC ACTIVITIES: CPT

## 2024-12-13 PROCEDURE — 97110 THERAPEUTIC EXERCISES: CPT

## 2024-12-13 NOTE — PROGRESS NOTES
Daily Note     Today's date: 2024  Patient name: Femi Acosta  : 1950  MRN: [id number]  Referring provider: No ref. provider found  Dx:   Encounter Diagnosis   Name Primary?    Parkinson's disease without fluctuating manifestations, unspecified whether dyskinesia present (HCC) Yes       Start Time: 1020  Stop Time: 1103  Total time in clinic (min): 43 minutes  PLAN OF CARE START:24  PLAN OF CARE END: 3/13/25  FREQUENCY: 2x/week  Precautions: FALL SAFETY,     Subjective: Educated on RSB program and recommending cognitive rehab due to cogntive testing scores. Discussed 2-3 times per week with focus on RSB and cognition with session primary focus wlith pt and spouse in understanding and agreement. Spouse and pt will think about possibility to do 3 times per week.       Objective: See treatment below.    Performed TUG  TU.66  TUG Cog 10.16 with severe difficulty to complete dual tasking  TUG Carry 10.15   TUG Cutoff Scores:  Demetrius Rivera et al, 2011, MDC: 4.8 sec  Julio Cesar et al, 2011, Fallers: Meds ON: < 12.21 sec, OFF: 15.5 sec    30 second STS: 8 reps  Assessing LE strength and Endurance and age related norms >12 for men at 74 years old                                  Physical Performance Test             Time     Score   Write sentence 18   2   Simulated  eating 21.21 1    Lift book 3.7 (blue binder) 3     Jacket   32 1                                            Violetta 4.5 2                                        Turn 360 2 0                                    50 foot walk  16.2 3     9 point scale:   - 32-36= no impairment  - 25-32= mild   - 17-24 moderate  - < 17 = unable to function in community.  7 point scale:  - < 19.4 mild  - 32-36 not frail.      Completed FMC task and  skills task of Qbitz with moderate-max cues for probvlem solving      Assessment: Tolerated treatment fair. Pt demonstrating in session decreased LE strength, endurance and dual tasking. Pt would benefit from  continued OT services to address functional cognitive, endurance, LE strength, FMC and dexterity      Plan: Continued skilled OT per POC.      NEW GOALS 12/13/2024  STGs in 4-6 weeks   Pt will increase LE strength to complete 30 second STS with 9 reps for IADLs  Pt will increase dual tasking to complete TUG cog by 3-4 math equations in 10 seconds for IADLs  Pt will increase PPT to 15 points overall for IADLs    LTGs in 8-12 weeks  Pt will increase LE strength to complete 30 second STS with 10 reps for IADLs  Pt will increase dual tasking to complete TUG cog by 4 math equations in 9.7 seconds for IADLs  Pt will increase PPT to 17 points overall for IADLs

## 2024-12-17 ENCOUNTER — OFFICE VISIT (OUTPATIENT)
Facility: CLINIC | Age: 74
End: 2024-12-17
Payer: MEDICARE

## 2024-12-17 DIAGNOSIS — G20.A1 PARKINSON'S DISEASE WITHOUT FLUCTUATING MANIFESTATIONS, UNSPECIFIED WHETHER DYSKINESIA PRESENT (HCC): Primary | ICD-10-CM

## 2024-12-17 PROCEDURE — 97112 NEUROMUSCULAR REEDUCATION: CPT

## 2024-12-17 PROCEDURE — 97530 THERAPEUTIC ACTIVITIES: CPT

## 2024-12-17 NOTE — PROGRESS NOTES
"Daily Note     Today's date: 2024  Patient name: Femi Acosta  : 1950  MRN: [id number]  Referring provider: Apolinar Carr MD  Dx:   Encounter Diagnosis   Name Primary?    Parkinson's disease without fluctuating manifestations, unspecified whether dyskinesia present (HCC) Yes       Start Time: 1145  Stop Time: 1233  Total time in clinic (min): 48 minutes    PLAN OF CARE START:24  PLAN OF CARE END: 3/13/25  FREQUENCY: 2x/week  Precautions: FALL SAFETY,     Subjective: Educated on RSB program, LSVT program, exercise on PD, and cognition on PD x 2 times during sesson. Pt reported \"I think they are trying to lock me up in a nursing home.\" Pt educated on therapist's role and prevent further decline and optimize independence. Pt continued to have delusion and perseverated. Required moderate prompting and reassurance with fair effect. Provided pt with business card      Objective: See treatment below.     24 1200   MiniBEST   Sit to stand 2   Rise to toes 2   Stand on 1 leg  1   Compensatory stepping correction forward  1   Compensatory stepping correction backward 1   Compensatory stepping correction lateral 1   Stance (feet together) eyes open, firm surface 2   Stance (feet together) eyes closed, foam surface 2   Incline eyes closed 1   Change in gait speed 2   Walk with head turns horizontal  2   Walk with pivot turns  1   Step over obstacles 2   TUG 1   Total score 21/28       TA:   Completed crossword puzzle grid of making own puzzle focusing on EF skills and sustained attention required increased time for processing and moderate protmpgint     Assessment: Tolerated treatment fair. Pt demonstrating idecreased balance. Pt would benefit from continued OT services to address functional cognitive, endurance, LE strength, FMC and dexterity      Plan: Continued skilled OT per POC.      NEW GOALS 2024  STGs in 4-6 weeks   Pt will increase LE strength to complete 30 second STS with 9 reps for " IADLs  Pt will increase dual tasking to complete TUG cog by 3-4 math equations in 10 seconds for IADLs  Pt will increase PPT to 15 points overall for IADLs    LTGs in 8-12 weeks  Pt will increase LE strength to complete 30 second STS with 10 reps for IADLs  Pt will increase dual tasking to complete TUG cog by 4 math equations in 9.7 seconds for IADLs  Pt will increase PPT to 17 points overall for IADLs    Pt will increase dynamic standing balance to 22/28 on minibest  for IADLs in 4 weeks  Pt will increase dynamic standing balance to 24/28 on minibest  for IADLs in 8-12 weeks

## 2024-12-19 ENCOUNTER — OFFICE VISIT (OUTPATIENT)
Facility: CLINIC | Age: 74
End: 2024-12-19
Payer: MEDICARE

## 2024-12-19 DIAGNOSIS — G20.A1 PARKINSON'S DISEASE WITHOUT FLUCTUATING MANIFESTATIONS, UNSPECIFIED WHETHER DYSKINESIA PRESENT (HCC): Primary | ICD-10-CM

## 2024-12-19 PROCEDURE — 97112 NEUROMUSCULAR REEDUCATION: CPT

## 2024-12-19 NOTE — PROGRESS NOTES
"Daily Note     Today's date: 2024  Patient name: Femi Acosta  : 1950  MRN: [id number]  Referring provider: No ref. provider found  Dx:   Encounter Diagnosis   Name Primary?    Parkinson's disease without fluctuating manifestations, unspecified whether dyskinesia present (HCC) Yes       Start Time: 1145  Stop Time: 1230  Total time in clinic (min): 45 minutes    PLAN OF CARE START:24  PLAN OF CARE END: 3/13/25  FREQUENCY: 2x/week  Precautions: FALL SAFETY,     Subjective: \"that was good\"      Objective: See treatment below.  Completed warm up exercises x 10 reps each:   WARM-UP  -10 large amplitude forward step with right foot  -10 large amplitude forward step with left foot  -10 large amplitude sidestep with right foot  -10 large amplitude sidestep with left foot     Shadow Boxing 10 reps each     Circuit 1 (2 min each round, 1 round, 30 sec rest break b/w rounds)  Cone weave with dual task stating states- moderate cues  X 5 STS with 1-6 combos  Lunges into bosu x 3 with 1-6 combos  Posterior resistance (yellow) monster squats with 1-6 combos      Assessment: Tolerated treatment fair. Pt demonstrating increased difficulty with dual tasking and required moderate prompting. Required min-moderate prompting for recall of combo sequence. Pt would benefit from continued OT services to address functional cognitive, endurance, LE strength, FMC and dexterity      Plan: Continued skilled OT per POC.      NEW GOALS 2024  STGs in 4-6 weeks   Pt will increase LE strength to complete 30 second STS with 9 reps for IADLs  Pt will increase dual tasking to complete TUG cog by 3-4 math equations in 10 seconds for IADLs  Pt will increase PPT to 15 points overall for IADLs    LTGs in 8-12 weeks  Pt will increase LE strength to complete 30 second STS with 10 reps for IADLs  Pt will increase dual tasking to complete TUG cog by 4 math equations in 9.7 seconds for IADLs  Pt will increase PPT to 17 points overall " for IADLs    Pt will increase dynamic standing balance to 22/28 on minibest  for IADLs in 4 weeks  Pt will increase dynamic standing balance to 24/28 on minibest  for IADLs in 8-12 weeks

## 2024-12-23 ENCOUNTER — OFFICE VISIT (OUTPATIENT)
Facility: CLINIC | Age: 74
End: 2024-12-23
Payer: MEDICARE

## 2024-12-23 DIAGNOSIS — G20.A1 PARKINSON'S DISEASE WITHOUT FLUCTUATING MANIFESTATIONS, UNSPECIFIED WHETHER DYSKINESIA PRESENT (HCC): Primary | ICD-10-CM

## 2024-12-23 PROCEDURE — 97530 THERAPEUTIC ACTIVITIES: CPT

## 2024-12-23 NOTE — PROGRESS NOTES
"Daily Note     Today's date: 2024  Patient name: Femi Acosta  : 1950  MRN: [id number]  Referring provider: Apolinar Carr MD  Dx:   Encounter Diagnosis   Name Primary?    Parkinson's disease without fluctuating manifestations, unspecified whether dyskinesia present (HCC) Yes       Start Time: 1715  Stop Time: 1800  Total time in clinic (min): 45 minutes    PLAN OF CARE START:24  PLAN OF CARE END: 3/13/25  FREQUENCY: 2x/week  Precautions: FALL SAFETY, color blind.     Subjective: \"that was tough\"       Objective: See treatment below.  TA:  Completeed multimatrix task of simple shapes and alphabet focusing on simple divided attention, working memory, FMC required max Vcs for keeping track on card, visual discrimination, memory of alphabet.   Completed matching game with visuospatial component focusing on  skills, sustained attention, EF skills. Requried moderate prompting      Assessment: Tolerated treatment fair. Pt demonstrating increased difficulty with memory and required increased time for porcessing. Pt would benefit from continued OT services to address functional cognitive, endurance, LE strength, FMC and dexterity      Plan: Continued skilled OT per POC. Go through memory strategies next session.       NEW GOALS 2024  STGs in 4-6 weeks   Pt will increase LE strength to complete 30 second STS with 9 reps for IADLs  Pt will increase dual tasking to complete TUG cog by 3-4 math equations in 10 seconds for IADLs  Pt will increase PPT to 15 points overall for IADLs    LTGs in 8-12 weeks  Pt will increase LE strength to complete 30 second STS with 10 reps for IADLs  Pt will increase dual tasking to complete TUG cog by 4 math equations in 9.7 seconds for IADLs  Pt will increase PPT to 17 points overall for IADLs    Pt will increase dynamic standing balance to 22/28 on minibest  for IADLs in 4 weeks  Pt will increase dynamic standing balance to 24/28 on minibest  for IADLs in 8-12 " weeks

## 2024-12-26 ENCOUNTER — OFFICE VISIT (OUTPATIENT)
Facility: CLINIC | Age: 74
End: 2024-12-26
Payer: MEDICARE

## 2024-12-26 DIAGNOSIS — G20.A1 PARKINSON'S DISEASE WITHOUT FLUCTUATING MANIFESTATIONS, UNSPECIFIED WHETHER DYSKINESIA PRESENT (HCC): Primary | ICD-10-CM

## 2024-12-26 PROCEDURE — 97530 THERAPEUTIC ACTIVITIES: CPT

## 2024-12-26 PROCEDURE — 97112 NEUROMUSCULAR REEDUCATION: CPT

## 2024-12-26 NOTE — PROGRESS NOTES
"Daily Note     Today's date: 2024  Patient name: Femi Acosta  : 1950  MRN: [id number]  Referring provider: Apolinar Carr MD  Dx:   Encounter Diagnosis   Name Primary?    Parkinson's disease without fluctuating manifestations, unspecified whether dyskinesia present (HCC) Yes         Start Time: 1021  Stop Time: 1102  Total time in clinic (min): 41 minutes    PLAN OF CARE START:24  PLAN OF CARE END: 3/13/25  FREQUENCY: 2x/week  Precautions: FALL SAFETY, color blind.     Subjective: \"This doesn't allow any time for daydreaming\" - in reference to complex array program on the BITS.     Objective: See treatment below.    NMR:   Completed the following exercises on the BITS with the pt wearing 2 lb wrist weights on each UE to provided proprioceptive input during amplified reaching exercises.   - BITS complex array with mubers 1-50 with 90% accuracy and taking 7 minutes to complete   - 3 rounds of BITS user paced tapping to work on attention, UE coordination.   - BITS recall exercise with images. 6 second flash, increasing, up to 6 words/images. 83% accurate on first trial and 63% accurate on the second trial.     TA:  -Completed simple qbitz puzzle with pt seated at table. Requires increased time and mod/max cueing from the therapist.     Assessment: Tolerated treatment fair. Pt demonstrating increased difficulty with memory and required increased time for porcessing. Pt would benefit from continued OT services to address functional cognitive, endurance, LE strength, FMC and dexterity      Plan: Continued skilled OT per POC. Go through memory strategies next session.       NEW GOALS 2024  STGs in 4-6 weeks   Pt will increase LE strength to complete 30 second STS with 9 reps for IADLs  Pt will increase dual tasking to complete TUG cog by 3-4 math equations in 10 seconds for IADLs  Pt will increase PPT to 15 points overall for IADLs    LTGs in 8-12 weeks  Pt will increase LE strength to complete " 30 second STS with 10 reps for IADLs  Pt will increase dual tasking to complete TUG cog by 4 math equations in 9.7 seconds for IADLs  Pt will increase PPT to 17 points overall for IADLs    Pt will increase dynamic standing balance to 22/28 on minibest  for IADLs in 4 weeks  Pt will increase dynamic standing balance to 24/28 on minibest  for IADLs in 8-12 weeks

## 2024-12-27 ENCOUNTER — TELEPHONE (OUTPATIENT)
Age: 74
End: 2024-12-27

## 2024-12-27 NOTE — TELEPHONE ENCOUNTER
Pt wife calling again looking to scheduled with Dr. Amaya in [facility], Advised of first avail pt wife declined and disconnected the call

## 2024-12-27 NOTE — TELEPHONE ENCOUNTER
Patients GI provider:       Number to return call: [phone]     Reason for call: Pt wife calling to schedule with Dr. Vincent verified he will be leaving [facility] and offered another provider, pt wife will check other providers and contact us back.    Scheduled procedure/appointment date if applicable:

## 2025-01-02 ENCOUNTER — OFFICE VISIT (OUTPATIENT)
Facility: CLINIC | Age: 75
End: 2025-01-02
Payer: MEDICARE

## 2025-01-02 ENCOUNTER — TELEPHONE (OUTPATIENT)
Dept: SPEECH THERAPY | Facility: CLINIC | Age: 75
End: 2025-01-02

## 2025-01-02 DIAGNOSIS — G20.A1 PARKINSON'S DISEASE WITHOUT FLUCTUATING MANIFESTATIONS, UNSPECIFIED WHETHER DYSKINESIA PRESENT (HCC): Primary | ICD-10-CM

## 2025-01-02 PROCEDURE — 97112 NEUROMUSCULAR REEDUCATION: CPT

## 2025-01-02 NOTE — TELEPHONE ENCOUNTER
Spoke to patient's significant other to schedule IE for speech therapy. Patient would like to wait to schedule this evaluation at this time, due to too many other therapy and doctor's appointments on his schedule, and will call back when he is ready.

## 2025-01-02 NOTE — PROGRESS NOTES
"Daily Note     Today's date: 2025  Patient name: Femi Acosta  : 1950  MRN: [id number]  Referring provider: Apolinar Carr MD  Dx:   Encounter Diagnosis   Name Primary?    Parkinson's disease without fluctuating manifestations, unspecified whether dyskinesia present (HCC) Yes                  PLAN OF CARE START:24  PLAN OF CARE END: 3/13/25  FREQUENCY: 2x/week  Precautions: FALL SAFETY,     Subjective: \"I need to get gloves\"      Objective: See treatment below.  Completed warm up exercises x 10 reps each:   WARM-UP  -10 large amplitude forward step with right foot  -10 large amplitude forward step with left foot  -10 large amplitude sidestep with right foot  -10 large amplitude sidestep with left foot     Shadow Boxing 20 reps each     Circuit 1 (2 min each round, 1 round, 30 sec rest break b/w rounds)  Step up on 8\" step- 1-6 combos  X 5 STS with 1-6 combos  Large step overs with 6\" with 1-6 combos  Side step>squat>side step over 6\" with 1-6 combos    Completed 4 mintues of 1-6 combos focusing on carryover of combos     Scheduled pt for month of January 3 times per week for RSB and cognitive rehab. Notified SLP for swallowing  Assessment: Tolerated treatment fair. Pt demonstrating increased difficulty with recall of combination sequence and Required moderate prompting for recall of combo sequence. Pt would benefit from continued OT services to address functional cognitive, endurance, LE strength, FMC and dexterity      Plan: Continued skilled OT per POC.      NEW GOALS 2024  STGs in 4-6 weeks   Pt will increase LE strength to complete 30 second STS with 9 reps for IADLs  Pt will increase dual tasking to complete TUG cog by 3-4 math equations in 10 seconds for IADLs  Pt will increase PPT to 15 points overall for IADLs    LTGs in 8-12 weeks  Pt will increase LE strength to complete 30 second STS with 10 reps for IADLs  Pt will increase dual tasking to complete TUG cog by 4 math equations in " 9.7 seconds for IADLs  Pt will increase PPT to 17 points overall for IADLs    Pt will increase dynamic standing balance to 22/28 on minibest  for IADLs in 4 weeks  Pt will increase dynamic standing balance to 24/28 on minibest  for IADLs in 8-12 weeks

## 2025-01-08 ENCOUNTER — EVALUATION (OUTPATIENT)
Facility: CLINIC | Age: 75
End: 2025-01-08
Payer: MEDICARE

## 2025-01-08 DIAGNOSIS — G20.A1 PARKINSON'S DISEASE WITHOUT FLUCTUATING MANIFESTATIONS, UNSPECIFIED WHETHER DYSKINESIA PRESENT (HCC): Primary | ICD-10-CM

## 2025-01-08 PROCEDURE — 97530 THERAPEUTIC ACTIVITIES: CPT

## 2025-01-08 NOTE — PROGRESS NOTES
OT Re-evaluation    Today's Date: 2025  Patient Name: Femi Acosta  : 1950  MRN: [id number]  Referring Provider: Apolinar Carr MD  Dx: Parkinson's disease without fluctuating manifestations, unspecified whether dyskinesia present (HCC) [G20.A1]    Active Problem List: There is no problem list on file for this patient.    Past Medical Hx:   Past Medical History:   Diagnosis Date    Allergic rhinitis     Constipation     GERD (gastroesophageal reflux disease)     Hyperlipidemia     Overactive bladder     Parkinson disease (HCC)     Prostate enlargement      Past Surgical Hx:   Past Surgical History:   Procedure Laterality Date    ANKLE FRACTURE SURGERY      CYST REMOVAL            Eval/ Re-eval POC expires FOTO Auth #/ Referral # Total units  Start date  Expiration date Extension                                                             Date               Units:  Used               Authed:  Remaining                   SKILLED ANALYSIS:  Pt presents to OP OT re-evaluation after 1 months of services in the setting of PD.  Pt reports he is unsure if he has noted improvements since start of POC.  He is motivated to participate in boxing, and is agreeable to also addressing other functional activities and functional cognition.  Based on assessments, pt is demonstrating mild (1 point) improvement on MoCA and significant (~20lb) decrease in b/l grasp strength.  Pt continues to demonstrate significant deficits in the following domains: overall functional cognition, sustained attn, divided attn, EF, , immediate and delayed visual recall, endurance, activity tolerance.  Recommend continued participation in OP OT services 2-3x/wk to maximize functioning and independence with daily activities.  Discussed results and recommendations with pt and his wife, both are in agreement.    In future session/re-eval, would benefit from MVPT based on performance during assessments today.    Subjective    Occupational  Profile:   Pt is a 74 year old retired . He enjoys to work outside and garden. Currently the pt having difficulty with writing, putting on clothing, working in his garden, performing lawn work and computer/cell phone usage. Over the past year or two the pt also states he has had more difficulty maintaining a conversation, as he loses his train of thought easily.    PATIENT GOAL: Improve writing, endurance, cognition, engage in RSB program.     HISTORY OF PRESENT ILLNESS:     Pt is a 74 y.o. male who was referred to Occupational Therapy s/p  Parkinson's disease without fluctuating manifestations, unspecified whether dyskinesia present (HCC) [G20.A1].     PMH:   Past Medical History:   Diagnosis Date    Allergic rhinitis     Constipation     GERD (gastroesophageal reflux disease)     Hyperlipidemia     Overactive bladder     Parkinson disease (HCC)     Prostate enlargement        Past Surgical Hx:   Past Surgical History:   Procedure Laterality Date    ANKLE FRACTURE SURGERY  1975    CYST REMOVAL       PLAN OF CARE START:24  PLAN OF CARE END: 3/13/25  FREQUENCY: 2-3x/week  Precautions: FALL SAFETY,     OBJECTIVE   9 HOLE PEG TEST: performed 9 hole peg test to assess dexterity/fine motor coordination with pt scoring 108 seconds on R hand  and 90 seconds on L hand  side. Pt demonstrating decreased FMC related to age-related norms     Trail Making Test A: 4:58 with 3 cues  Trail Making Test B: 6:30 to complete trhough E with 5 cues  Norms: A: 40.13 seconds, B: 86.27 seconds.  Scores imply severe deficits with sustained and divided attn, EF, working memory.  Of note, often reports the correct item in sequence aloud, but requires SIGNIFICANTLY inc time to locate.  Anticipate he has a figure ground deficit    Contextual Memory Test:   Immediate:    Delayed:     Matthew Cognitive Assessment Version 8.2 (MoCA V8.2)  Visuospatial/executive functionin/5  Naming:  3/3  Memory: 1st trial:  ,  "2nd trial:  3/5  Attention/concentration: 2  List of letters:   Seial Seven Subtraction:  2/3   Language/sentence repetition:    Language Fluency:  0  Abstract/Correlational Thinkin/2  Delayed Recall:  0  Orientation:                Memory Index Score: 8/15  MoCA V1 8.2 Raw Score:  17/30, MIS:  6/15, indicative of MODERATE neurocognitive impairments.          UE Strength:              MALCOM: RUE\" 72lb (previously 92 lbs) LUE: 74lb (previously 95 lbs)  The age norm is approximately 55-65 lbs and indicating normal  strength  Pt is R hand dominant      Range of Motion:  AROM:   BUE within normal limits     MMT: Not assessed 25  R UE: 4+/5 in all pivots      L UE 4+/5 in all pivots      Pt is L hand dominant    GOALS:   Short Term Goals: 4 weeks   Pt will increase delayed recall and recall 2/5 items on MOCA to complete IADLs  Pt will demonstrate improved functional cognition as evidenced by improving score on the MoCA to 19/30 to improve performance during ADLs and IADLs  Pt will increase FMC by 10 seconds bilaterally on 9 hole peg to complete ADLs and IADLs   Pt will demonstrate independence with UE strengthening HEP as per patient report    Long Term Goals: 3 months   Pt will improve functional endurance to engage in daily tasks or yard works without complaints of fatigue.   Pt will will improve social participation and engage in the RSB program on a weekly basis to assist with functional mobility and endurance.   Pt will improve performance with fasteners for clothing management as per patient report.  Pt will improve UE strength to 5/5 in all planes to improve performance with ADLs/IADLs      "

## 2025-01-09 ENCOUNTER — OFFICE VISIT (OUTPATIENT)
Facility: CLINIC | Age: 75
End: 2025-01-09
Payer: MEDICARE

## 2025-01-09 DIAGNOSIS — G20.A1 PARKINSON'S DISEASE WITHOUT FLUCTUATING MANIFESTATIONS, UNSPECIFIED WHETHER DYSKINESIA PRESENT (HCC): Primary | ICD-10-CM

## 2025-01-09 PROCEDURE — 97112 NEUROMUSCULAR REEDUCATION: CPT

## 2025-01-09 NOTE — PROGRESS NOTES
"Daily Note     Today's date: 2025  Patient name: Femi Acosta  : 1950  MRN: [id number]  Referring provider: Apolinar Carr MD  Dx:   Encounter Diagnosis   Name Primary?    Parkinson's disease without fluctuating manifestations, unspecified whether dyskinesia present (HCC) Yes                  PLAN OF CARE START:24  PLAN OF CARE END: 3/13/25  FREQUENCY: 2x/week  Precautions: FALL SAFETY,     Subjective: \"I got gloves\"      Objective: See treatment below.  Completed warm up exercises x 10 reps each:   WARM-UP  -10 large amplitude forward step with right foot  -10 large amplitude forward step with left foot  -10 large amplitude sidestep with right foot  -10 large amplitude sidestep with left foot     Shadow Boxing 20 reps each required cues for recall     Circuit 1 (2 min each round, 1 round, 30 sec rest break b/w rounds)  High knee walking with red posterior resistance - 1-6 combos  X 5 STS with 1-6 combos  Large step overs with 6\" hurdles with posterior resistance (red)  X 5 reps of STS with 1-6 combos    Provided pt with printout of combination sequence.   Educated spouse on PD and neurodegenerative nature and impact on cognition.    Assessment: Tolerated treatment fair. Pt demonstrating decreased recall for combination sequence. Required max cues in beginning and trialed stating combos outloud with good carryover.. Pt would benefit from continued OT services to address functional cognitive, endurance, LE strength, FMC and dexterity      Plan: Continued skilled OT per POC.      NEW GOALS 2024  STGs in 4-6 weeks   Pt will increase LE strength to complete 30 second STS with 9 reps for IADLs  Pt will increase dual tasking to complete TUG cog by 3-4 math equations in 10 seconds for IADLs  Pt will increase PPT to 15 points overall for IADLs    LTGs in 8-12 weeks  Pt will increase LE strength to complete 30 second STS with 10 reps for IADLs  Pt will increase dual tasking to complete TUG cog by 4 " math equations in 9.7 seconds for IADLs  Pt will increase PPT to 17 points overall for IADLs    Pt will increase dynamic standing balance to 22/28 on minibest  for IADLs in 4 weeks  Pt will increase dynamic standing balance to 24/28 on minibest  for IADLs in 8-12 weeks

## 2025-01-14 ENCOUNTER — TELEPHONE (OUTPATIENT)
Facility: CLINIC | Age: 75
End: 2025-01-14

## 2025-01-14 ENCOUNTER — OFFICE VISIT (OUTPATIENT)
Facility: CLINIC | Age: 75
End: 2025-01-14
Payer: MEDICARE

## 2025-01-14 DIAGNOSIS — G20.A1 PARKINSON'S DISEASE WITHOUT FLUCTUATING MANIFESTATIONS, UNSPECIFIED WHETHER DYSKINESIA PRESENT (HCC): Primary | ICD-10-CM

## 2025-01-14 PROCEDURE — 97530 THERAPEUTIC ACTIVITIES: CPT

## 2025-01-14 NOTE — PROGRESS NOTES
"Daily Note     Today's date: 2025  Patient name: Femi Acosta  : 1950  MRN: [id number]  Referring provider: Apolinar Carr MD  Dx:   Encounter Diagnosis   Name Primary?    Parkinson's disease without fluctuating manifestations, unspecified whether dyskinesia present (HCC) Yes                  PLAN OF CARE START:24  PLAN OF CARE END: 3/13/25  FREQUENCY: 2x/week  Precautions: FALL SAFETY,     Subjective: \"its going well\"      Objective: See treatment below.  TA:   Completed 13 piece wheel [place] puzzle focusing on  skills, EF skills, sustained attention, FMC and dexterity Required moderate prompting for problem solving due to decreased  skills  Completed trail making web only letters while stating foods using red pincher focusing on FMC, dexterity, sustained attention, memory,  required moderate-max cues for sequencing, problem solving letters.     Provided HEP of missing letter  Discussed transitioning to PT and SLP services here instead of [facility] end of feb.       Assessment: Tolerated treatment fair. Pt demonstrating decreased  skills, sustained atetntion and sequencing. Pt would benefit from continued OT services to address functional cognitive, endurance, LE strength, FMC and dexterity      Plan: Continued skilled OT per POC.      NEW GOALS 2024  STGs in 4-6 weeks   Pt will increase LE strength to complete 30 second STS with 9 reps for IADLs  Pt will increase dual tasking to complete TUG cog by 3-4 math equations in 10 seconds for IADLs  Pt will increase PPT to 15 points overall for IADLs    LTGs in 8-12 weeks  Pt will increase LE strength to complete 30 second STS with 10 reps for IADLs  Pt will increase dual tasking to complete TUG cog by 4 math equations in 9.7 seconds for IADLs  Pt will increase PPT to 17 points overall for IADLs    Pt will increase dynamic standing balance to 22/28 on minibest  for IADLs in 4 weeks  Pt will increase dynamic standing balance to 24/28 on " minibest  for IADLs in 8-12 weeks

## 2025-01-16 ENCOUNTER — OFFICE VISIT (OUTPATIENT)
Facility: CLINIC | Age: 75
End: 2025-01-16
Payer: MEDICARE

## 2025-01-16 DIAGNOSIS — G20.A1 PARKINSON'S DISEASE WITHOUT FLUCTUATING MANIFESTATIONS, UNSPECIFIED WHETHER DYSKINESIA PRESENT (HCC): Primary | ICD-10-CM

## 2025-01-16 PROCEDURE — 97112 NEUROMUSCULAR REEDUCATION: CPT

## 2025-01-16 NOTE — PROGRESS NOTES
Daily Note     Today's date: 2025  Patient name: Femi Acosta  : 1950  MRN: [id number]  Referring provider: Apolinar Carr MD  Dx:   Encounter Diagnosis   Name Primary?    Parkinson's disease without fluctuating manifestations, unspecified whether dyskinesia present (HCC) Yes                  PLAN OF CARE START:24  PLAN OF CARE END: 3/13/25  FREQUENCY: 2x/week  Precautions: FALL SAFETY,     Subjective: pt's spouse reports they are going to meet with neurologist specializing.       Objective: See treatment below.  Completed warm up exercises x 10 reps each:   WARM-UP  -10 large amplitude forward step with right foot  -10 large amplitude forward step with left foot  -10 large amplitude sidestep with right foot  -10 large amplitude sidestep with left foot     Shadow Boxing 20 reps each required cues for recall    Completed 1-6, 6-1 combos with pt able to carry over with min-mod cues for carryover- completed massed practice for carryover of combinations    Completed 3-4 sequence combinations with moderate prompting for recall and recall of UE coordination      Circuit 1 (2 min each round, 1 round, 30 sec rest break b/w rounds)  Blaze pod suicides with forward/backward walking  X 5 squats with tidal tank with 1-6 combos  Toe taps and blaze pods while stating cities focusing on dual tasking      Discussed cognitive impact on driving and spouse reprots she is primary  and they will be f/u with neurologist regarding driving in .     Assessment: Tolerated treatment fair. Pt demonstrating decreased recall for combination sequence and difficulty with dual tasking.  Pt would benefit from continued OT services to address functional cognitive, endurance, LE strength, FMC and dexterity      Plan: Continued skilled OT per POC.      NEW GOALS 2024  STGs in 4-6 weeks   Pt will increase LE strength to complete 30 second STS with 9 reps for IADLs  Pt will increase dual tasking to complete TUG cog by  3-4 math equations in 10 seconds for IADLs  Pt will increase PPT to 15 points overall for IADLs    LTGs in 8-12 weeks  Pt will increase LE strength to complete 30 second STS with 10 reps for IADLs  Pt will increase dual tasking to complete TUG cog by 4 math equations in 9.7 seconds for IADLs  Pt will increase PPT to 17 points overall for IADLs    Pt will increase dynamic standing balance to 22/28 on minibest  for IADLs in 4 weeks  Pt will increase dynamic standing balance to 24/28 on minibest  for IADLs in 8-12 weeks

## 2025-01-17 ENCOUNTER — OFFICE VISIT (OUTPATIENT)
Facility: CLINIC | Age: 75
End: 2025-01-17
Payer: MEDICARE

## 2025-01-17 DIAGNOSIS — G20.A1 PARKINSON'S DISEASE WITHOUT FLUCTUATING MANIFESTATIONS, UNSPECIFIED WHETHER DYSKINESIA PRESENT (HCC): Primary | ICD-10-CM

## 2025-01-17 PROCEDURE — 97530 THERAPEUTIC ACTIVITIES: CPT | Performed by: OCCUPATIONAL THERAPIST

## 2025-01-17 PROCEDURE — 97112 NEUROMUSCULAR REEDUCATION: CPT | Performed by: OCCUPATIONAL THERAPIST

## 2025-01-17 NOTE — PROGRESS NOTES
"Daily Note     Today's date: 2025  Patient name: Femi Acosta  : 1950  MRN: [id number]  Referring provider: Apolinar Carr MD  Dx:   Encounter Diagnosis   Name Primary?    Parkinson's disease without fluctuating manifestations, unspecified whether dyskinesia present (HCC) Yes       Start Time: 08  Stop Time: 1005  Total time in clinic (min): 78 minutes    PLAN OF CARE START:24  PLAN OF CARE END: 3/13/25  FREQUENCY: 2x/week  Precautions: FALL SAFETY,     Subjective: \"Is this to determine how intelligent I am?\" (when presented with maze worksheet)      Objective: See treatment below.  TA/NMR:  -Maze worksheets x3 in increasing complexity completed to address EF,  skills, FMC. Required extra time.   -Completed visual scanning/reaching activity with states index cards spread across mat, fields of 3 at a time and graded down to 2 at a time for a few rounds d/t max difficulty, with 1lb wrist weights donned focusing on standing balance, large amplitude reaching, sustained attention and immediate recall.  -Lengthy discussion regarding PD sxs, interdisciplinary management, POC and goals for maintenance vs slowing decline over time, review of pt's performance on cognitive and FM assessments from IE/RE, need for cues from spouse during activities at home d/t impaired attention. Also discussed driving and that safety is the biggest priority. Educated on needing Rx from a physician to complete fitness to drive evaluation here.   Pt's wife requested ideas for exercises pt can do for his hands.   -Provided list of fine motor activities using household items and resistive sponge (blue) and educated on performing gross grasp alternating with rotation in hand to address strength and FMC/dexterity.    Assessment: Tolerated treatment fair. Pt demonstrating difficulty recalling fields of 3 words, slow processing speed, bradykinesia, decreased insight into cognitive deficits.  Pt would benefit from continued OT " services to address functional cognition, endurance, LE strength, FMC and dexterity.      Plan: Continued skilled OT per POC.      NEW GOALS 12/13/2024  STGs in 4-6 weeks   Pt will increase LE strength to complete 30 second STS with 9 reps for IADLs  Pt will increase dual tasking to complete TUG cog by 3-4 math equations in 10 seconds for IADLs  Pt will increase PPT to 15 points overall for IADLs    LTGs in 8-12 weeks  Pt will increase LE strength to complete 30 second STS with 10 reps for IADLs  Pt will increase dual tasking to complete TUG cog by 4 math equations in 9.7 seconds for IADLs  Pt will increase PPT to 17 points overall for IADLs    Pt will increase dynamic standing balance to 22/28 on minibest  for IADLs in 4 weeks  Pt will increase dynamic standing balance to 24/28 on minibest  for IADLs in 8-12 weeks

## 2025-01-21 ENCOUNTER — APPOINTMENT (OUTPATIENT)
Facility: CLINIC | Age: 75
End: 2025-01-21
Payer: MEDICARE

## 2025-01-22 ENCOUNTER — APPOINTMENT (OUTPATIENT)
Facility: CLINIC | Age: 75
End: 2025-01-22
Payer: MEDICARE

## 2025-01-23 ENCOUNTER — OFFICE VISIT (OUTPATIENT)
Facility: CLINIC | Age: 75
End: 2025-01-23
Payer: MEDICARE

## 2025-01-23 DIAGNOSIS — G20.A1 PARKINSON'S DISEASE WITHOUT FLUCTUATING MANIFESTATIONS, UNSPECIFIED WHETHER DYSKINESIA PRESENT (HCC): Primary | ICD-10-CM

## 2025-01-23 PROCEDURE — 97530 THERAPEUTIC ACTIVITIES: CPT

## 2025-01-23 NOTE — PROGRESS NOTES
"Daily Note     Today's date: 2025  Patient name: Femi Acosta  : 1950  MRN: [id number]  Referring provider: Apolinar Carr MD  Dx:   Encounter Diagnosis   Name Primary?    Parkinson's disease without fluctuating manifestations, unspecified whether dyskinesia present (HCC) Yes         Start Time: 1015  Stop Time: 1100  Total time in clinic (min): 45 minutes    PLAN OF CARE START:24  PLAN OF CARE END: 3/13/25  FREQUENCY: 2x/week  Precautions: FALL SAFETY,     Subjective: Pts family member asked if they could transition care to [place] as this is closer for them, stated \"we will think about it.\"      Objective: See treatment below.    TA:  - with hand gripper at 25lbs Pt used amplified reach with crossing midline to obtain foam blocks with a naming component to work on divided attention and  strength  - multimatrix matching letters and numbers with naming component to work on alternating attention, problem solving requiring mod VC for recall of which letter is is on    Assessment: Tolerated treatment well. Difficulty with word finding during naming component of tasks today and poor divided attention. Pt demonstrating slow processing speed, bradykinesia, decreased insight into cognitive deficits.  Pt would benefit from continued OT services to address functional cognition, endurance, LE strength, FMC and dexterity.      Plan: Continued skilled OT per POC.      NEW GOALS 2024  STGs in 4-6 weeks   Pt will increase LE strength to complete 30 second STS with 9 reps for IADLs  Pt will increase dual tasking to complete TUG cog by 3-4 math equations in 10 seconds for IADLs  Pt will increase PPT to 15 points overall for IADLs    LTGs in 8-12 weeks  Pt will increase LE strength to complete 30 second STS with 10 reps for IADLs  Pt will increase dual tasking to complete TUG cog by 4 math equations in 9.7 seconds for IADLs  Pt will increase PPT to 17 points overall for IADLs    Pt will increase " dynamic standing balance to 22/28 on minibest  for IADLs in 4 weeks  Pt will increase dynamic standing balance to 24/28 on minibest  for IADLs in 8-12 weeks

## 2025-01-28 ENCOUNTER — OFFICE VISIT (OUTPATIENT)
Facility: CLINIC | Age: 75
End: 2025-01-28
Payer: MEDICARE

## 2025-01-28 DIAGNOSIS — G20.A1 PARKINSON'S DISEASE WITHOUT FLUCTUATING MANIFESTATIONS, UNSPECIFIED WHETHER DYSKINESIA PRESENT (HCC): Primary | ICD-10-CM

## 2025-01-28 PROCEDURE — 97112 NEUROMUSCULAR REEDUCATION: CPT

## 2025-01-28 NOTE — PROGRESS NOTES
Daily Note     Today's date: 2025  Patient name: Femi Acosta  : 1950  MRN: [id number]  Referring provider: Apolinar Carr MD  Dx:   Encounter Diagnosis   Name Primary?    Parkinson's disease without fluctuating manifestations, unspecified whether dyskinesia present (HCC) Yes         Start Time: 930  Stop Time: 1015  Total time in clinic (min): 45 minutes    PLAN OF CARE START:24  PLAN OF CARE END: 3/13/25  FREQUENCY: 2x/week  Precautions: FALL SAFETY,     Subjective: No new reports today.     Objective: See treatment below.    NMR:   Completed all exercises with 2 lb wrist weights on for proprioceptive input while reaching.   -Bean bag toss to target board in standing with pt alternating reaching across midline to retrieve bean bags   -In standing at mat - pt pushes all XL squigz onto wall, then pulls all off while alternating UE and crossing midline.   -In seated completed marble mosaic board with pt using code to place marbles with green clothes pin to . Pt alternating hands every row. Required min(A) from the therapist throughout for sequencing correctly.   -Completed pixie cubes puzzle with therapist setting up all sides of pieces due to color blindness. Pt able to complete 50% independently and required mod(A) for other pieces. FMC,  skills.     Assessment: Tolerated treatment well. Today's session focused on amplified movements and crossing midline in standing with functional activity tolerance. Poor insight to deficits during cognitive tasks today.  Pt demonstrating slow processing speed, bradykinesia, decreased insight into cognitive deficits.  Pt would benefit from continued OT services to address functional cognition, endurance, LE strength, FMC and dexterity.      Plan: Continued skilled OT per POC.      NEW GOALS 2024  STGs in 4-6 weeks   Pt will increase LE strength to complete 30 second STS with 9 reps for IADLs  Pt will increase dual tasking to complete TUG cog by  3-4 math equations in 10 seconds for IADLs  Pt will increase PPT to 15 points overall for IADLs    LTGs in 8-12 weeks  Pt will increase LE strength to complete 30 second STS with 10 reps for IADLs  Pt will increase dual tasking to complete TUG cog by 4 math equations in 9.7 seconds for IADLs  Pt will increase PPT to 17 points overall for IADLs    Pt will increase dynamic standing balance to 22/28 on minibest  for IADLs in 4 weeks  Pt will increase dynamic standing balance to 24/28 on minibest  for IADLs in 8-12 weeks

## 2025-01-29 ENCOUNTER — OFFICE VISIT (OUTPATIENT)
Facility: CLINIC | Age: 75
End: 2025-01-29
Payer: MEDICARE

## 2025-01-29 DIAGNOSIS — G20.A1 PARKINSON'S DISEASE WITHOUT FLUCTUATING MANIFESTATIONS, UNSPECIFIED WHETHER DYSKINESIA PRESENT (HCC): Primary | ICD-10-CM

## 2025-01-29 PROCEDURE — 97112 NEUROMUSCULAR REEDUCATION: CPT | Performed by: OCCUPATIONAL THERAPIST

## 2025-01-29 PROCEDURE — 97530 THERAPEUTIC ACTIVITIES: CPT | Performed by: OCCUPATIONAL THERAPIST

## 2025-01-29 NOTE — PROGRESS NOTES
Daily Note     Today's date: 2025  Patient name: Femi Acosta  : 1950  MRN: [id number]  Referring provider: Apolinar Carr MD  Dx:   Encounter Diagnosis   Name Primary?    Parkinson's disease without fluctuating manifestations, unspecified whether dyskinesia present (HCC) Yes           Start Time: 1019  Stop Time: 1100  Total time in clinic (min): 41 minutes    PLAN OF CARE START:24  PLAN OF CARE END: 3/13/25  FREQUENCY: 2x/week  Precautions: FALL SAFETY,     Subjective: No new reports today.     Objective: See treatment below.    TA/NMR:   -BITS visual scanning 50 number sequence with flashing central fixation, pt reaching across midline to tap each number to incorporate large amplitude movement. Required mod cues/prompts for 1, then improved to min cues intermittently for recall of location in sequence. Completed in 17:49 with 77% accuracy.   -Wooden sudoku setup focusing on FMC and sustained attention. Significantly increased time to complete ~25% of board. Pt/wife would like to try a sudoku puzzle next session.    Assessment: Tolerated treatment well. Today's session focused on amplified movements and crossing midline in standing, FMC, sustained attention.  Pt demonstrating slow processing speed, bradykinesia, decreased insight into cognitive deficits.  Pt would benefit from continued OT services to address functional cognition, endurance, LE strength, FMC and dexterity.      Plan: Continued skilled OT per POC.      NEW GOALS 2024  STGs in 4-6 weeks   Pt will increase LE strength to complete 30 second STS with 9 reps for IADLs  Pt will increase dual tasking to complete TUG cog by 3-4 math equations in 10 seconds for IADLs  Pt will increase PPT to 15 points overall for IADLs    LTGs in 8-12 weeks  Pt will increase LE strength to complete 30 second STS with 10 reps for IADLs  Pt will increase dual tasking to complete TUG cog by 4 math equations in 9.7 seconds for IADLs  Pt will increase  PPT to 17 points overall for IADLs    Pt will increase dynamic standing balance to 22/28 on minibest  for IADLs in 4 weeks  Pt will increase dynamic standing balance to 24/28 on minibest  for IADLs in 8-12 weeks

## 2025-01-30 ENCOUNTER — OFFICE VISIT (OUTPATIENT)
Facility: CLINIC | Age: 75
End: 2025-01-30
Payer: MEDICARE

## 2025-01-30 DIAGNOSIS — G20.A1 PARKINSON'S DISEASE WITHOUT FLUCTUATING MANIFESTATIONS, UNSPECIFIED WHETHER DYSKINESIA PRESENT (HCC): Primary | ICD-10-CM

## 2025-01-30 PROCEDURE — 97530 THERAPEUTIC ACTIVITIES: CPT

## 2025-01-30 PROCEDURE — 97112 NEUROMUSCULAR REEDUCATION: CPT

## 2025-01-30 NOTE — PROGRESS NOTES
"Daily Note     Today's date: 2025  Patient name: Femi Acosta  : 1950  MRN: [id number]  Referring provider: Apolinar Carr MD  Dx:   Encounter Diagnosis   Name Primary?    Parkinson's disease without fluctuating manifestations, unspecified whether dyskinesia present (HCC) Yes             Start Time: 930  Stop Time: 1020  Total time in clinic (min): 50 minutes    PLAN OF CARE START:24  PLAN OF CARE END: 3/13/25  FREQUENCY: 2x/week  Precautions: FALL SAFETY,     Subjective: Pts wife reports she is going to call the [place] office to switch care over there because it is closer to home and less busy environment, \"he will do better there\"    Objective: See treatment below.    TA/NMR:   - created words out of bananagrams provided between 3-5 letters long to work on sustained attention, abstraction, and FMC - required mod cues to start however faded to mod I  - marble mosaic completed to work on FMC/dexterity      Assessment: Tolerated treatment well. Pt demonstrated good FMC of tweezers with marbles today. Required increased time for processing  and mod cues at the beginning of each activity, but with repetition completed these with mod I. Pt demonstrating slow processing speed, bradykinesia, decreased insight into cognitive deficits.  Pt would benefit from continued OT services to address functional cognition, endurance, LE strength, FMC and dexterity.      Plan: Continued skilled OT per POC.      NEW GOALS 2024  STGs in 4-6 weeks   Pt will increase LE strength to complete 30 second STS with 9 reps for IADLs  Pt will increase dual tasking to complete TUG cog by 3-4 math equations in 10 seconds for IADLs  Pt will increase PPT to 15 points overall for IADLs    LTGs in 8-12 weeks  Pt will increase LE strength to complete 30 second STS with 10 reps for IADLs  Pt will increase dual tasking to complete TUG cog by 4 math equations in 9.7 seconds for IADLs  Pt will increase PPT to 17 points overall " for IADLs    Pt will increase dynamic standing balance to 22/28 on minibest  for IADLs in 4 weeks  Pt will increase dynamic standing balance to 24/28 on minibest  for IADLs in 8-12 weeks

## 2025-01-31 ENCOUNTER — TELEPHONE (OUTPATIENT)
Dept: GASTROENTEROLOGY | Facility: MEDICAL CENTER | Age: 75
End: 2025-01-31

## 2025-01-31 NOTE — TELEPHONE ENCOUNTER
Left voicemail and requested call back     Called patient to inform that for his appointment for 02/07 had to be rescheduled due to provider template change, I did reschedule him for 02/17 1:30PM  at [facility] with [facility], if he wants to be scheduled at the [facility] we are booking for March and April for new patients. He is placed on cancellation list as well. Asked patient to please call us if this scheduled appointment is ok.

## 2025-02-03 ENCOUNTER — EVALUATION (OUTPATIENT)
Facility: CLINIC | Age: 75
End: 2025-02-03
Payer: MEDICARE

## 2025-02-03 ENCOUNTER — TELEPHONE (OUTPATIENT)
Dept: GASTROENTEROLOGY | Facility: MEDICAL CENTER | Age: 75
End: 2025-02-03

## 2025-02-03 DIAGNOSIS — G20.A1 PARKINSON'S DISEASE WITHOUT FLUCTUATING MANIFESTATIONS, UNSPECIFIED WHETHER DYSKINESIA PRESENT (HCC): Primary | ICD-10-CM

## 2025-02-03 PROCEDURE — 97530 THERAPEUTIC ACTIVITIES: CPT

## 2025-02-03 NOTE — TELEPHONE ENCOUNTER
Pts wife called to advise that they do not want to be rescheduled with another doctor and that this would be the 2nd time they had to be rescheduled. Appt.s for Dr. Gant are not coming up until April. Pt is asking for a cb to discuss scheduling sooner with the

## 2025-02-03 NOTE — TELEPHONE ENCOUNTER
Called and spoke to patients wife to schedule follow up with Dr. Jaiyeola, patient has scheduled and asked not to reschedule again. Placed on cancellation list. Patient will keep 02/17 with Zachary, but if anything patient might cancel 02/17 and keep Dr. Jaiyeola appt.     Rescheduled Appointment  (Newest Message First)  View All Conversations on this Encounter   Daisy Sanon routed conversation to [facility] Clerical1 hour ago (12:52 PM)     Daisy Sanon1 hour ago (12:52 PM)     NS  Pts wife called to advise that they do not want to be rescheduled with another doctor and that this would be the 2nd time they had to be rescheduled. Appt.s for Dr. Gant are not coming up until April. Pt is asking for a cb to discuss scheduling sooner with the DR.          Note         You3 days ago     AV  Left voicemail and requested call back      Called patient to inform that for his appointment for 02/07 had to be rescheduled due to provider template change, I did reschedule him for 02/17 1:30PM  at [facility] with [facility], if he wants to be scheduled at the [facility] facility we are booking for March and April for new patients. He is placed on cancellation list as well. Asked patient to please call us if this scheduled appointment is ok.          Note         Recent Patient Communication     Last Update Description Specialty     Today Rescheduled Appointment Gastroenterology     [facility] Closed     2 weeks ago -- Occupational Therapy     [facility] Closed     1 month ago -- Speech Therapy     [facility] Closed     1 month ago Procedure/Surgery Scheduling Instructions Gastroenterology     [facility] Pod Rosmery [facility] Closed

## 2025-02-03 NOTE — PROGRESS NOTES
"OT Re-evaluation    Today's Date: 2/3/2025  Patient Name: Femi Acosta  : 1950  MRN: [id number]  Referring Provider: Apolinar Carr MD  Dx: Parkinson's disease without fluctuating manifestations, unspecified whether dyskinesia present (HCC) [G20.A1]    Active Problem List: There is no problem list on file for this patient.    Past Medical Hx:   Past Medical History:   Diagnosis Date    Allergic rhinitis     Constipation     GERD (gastroesophageal reflux disease)     Hyperlipidemia     Overactive bladder     Parkinson disease (HCC)     Prostate enlargement      Past Surgical Hx:   Past Surgical History:   Procedure Laterality Date    ANKLE FRACTURE SURGERY      CYST REMOVAL            Eval/ Re-eval POC expires FOTO Auth #/ Referral # Total units  Start date  Expiration date Extension                                                             Date               Units:  Used               Authed:  Remaining                   SKILLED ANALYSIS:  Pt presents to OP OT re-evaluation after about 2 months of services in the setting of PD.  Pt is switching services to the [place] location at this time due to it being closer to their home. Pt is unsure if he has changes in his functional state, however he reports he is surprised by how \"slow\" he processes activities. Pt c/o vision changes including fatigue and double vision recently, therefore a vision screening will be performed next session (unable today due to time constraints). Based on assessments, pt is demonstrating 2-point improvement on MoCA, improving Pt from the moderate cognitive impairment to mild cognitive impairments based on this assessment. He shows significant improvements in TM A and B time, however still requires max cueing to complete TM B and this is terminated at letter E due to poor problem solving and recall of directions. Pts visual memory has improved as noted through improvements in CMT immediate and delayed recall. Pt has met 1 " "goal this reporting period for MoCA improvements. Pt continues to demonstrate significant deficits in the following domains: overall functional cognition, sustained attn, divided attn, EF, , immediate and delayed visual recall, endurance, activity tolerance.  Recommend continued participation in OP OT services 2-3x/wk for 6-8 more weeks to maximize functioning and independence with daily activities.  Discussed results and recommendations with pt and his wife, both are in agreement.    In future session/re-eval, would benefit from MVPT based on performance during assessments today.    Subjective  2/3: \"It shocks me how slow it takes me sometimes to figure these things out during sessions\"      Occupational Profile:   Pt is a 74 year old retired . He enjoys to work outside and garden. Currently the pt having difficulty with writing, putting on clothing, working in his garden, performing lawn work and computer/cell phone usage. Over the past year or two the pt also states he has had more difficulty maintaining a conversation, as he loses his train of thought easily.    PATIENT GOAL: Improve writing, endurance, cognition, engage in RSB program.     HISTORY OF PRESENT ILLNESS:     Pt is a 74 y.o. male who was referred to Occupational Therapy s/p  Parkinson's disease without fluctuating manifestations, unspecified whether dyskinesia present (HCC) [G20.A1].     PMH:   Past Medical History:   Diagnosis Date    Allergic rhinitis     Constipation     GERD (gastroesophageal reflux disease)     Hyperlipidemia     Overactive bladder     Parkinson disease (HCC)     Prostate enlargement        Past Surgical Hx:   Past Surgical History:   Procedure Laterality Date    ANKLE FRACTURE SURGERY  1975    CYST REMOVAL  1972     PLAN OF CARE START:12/12/24  PLAN OF CARE END: 3/13/25  FREQUENCY: 2-3x/week  Precautions: FALL SAFETY,     OBJECTIVE   9 HOLE PEG TEST: performed 9 hole peg test to assess dexterity/fine motor " coordination with pt scoring 108 seconds on R hand  and 90 seconds on L hand  side. Pt demonstrating decreased FMC related to age-related norms  NT 2/3    Trail Making Test A: 3 min 53 sec with 4 cues for problem solving (prior: 4:58 with 3 cues) IMPROVED  Trail Making Test B: 4 min to complete through E with 5 cues (prior: 6:30 to complete trhough E with 5 cues) IMPROVED  Norms: A: 40.13 seconds, B: 86.27 seconds.      Scores imply severe deficits with sustained and divided attn, EF, working memory.    Contextual Memory Test:   Immediate:  (prior: ) IMPROVED   Delayed:  (prior: ) IMPROVED    [place] Cognitive Assessment Version 8.3 (MoCA V8.3)  Visuospatial/executive functioning:  3/5  Naming:  3/3  Memory: 1st trial:  5, 2nd trial:  4/5  Attention/concentration: 2/2  List of letters: 0/1  Seial Seven Subtraction:  3/3   Language/sentence repetition:  1/2  Language Fluency:  0/1 (n=5)  Abstract/Correlational Thinkin/2  Delayed Recall:  0/5  Orientation:  5/6              Memory Index Score: 4/15  MoCA V1 8.3 Raw Score:  1930, MIS:  4/15, indicative of MILD neurocognitive impairments.    MoCA Scoring        Normal: 26+         Mild Cognitive Impairment: 18-25          Moderate Cognitive Impairment: 10-17         Severe Cognitive Impairment: <10     Performed TUG  TU.66  TUG Cog 10.16 with severe difficulty to complete dual tasking  TUG Carry 10.15   TUG Cutoff Scores:  Demetrius Rivera et al, 2011, MDC: 4.8 sec  Julio Cesar et al, 2011, Fallers: Meds ON: < 12.21 sec, OFF: 15.5 sec     30 second STS: 8 reps  Assessing LE strength and Endurance and age related norms >12 for men at 74 years old  *NT 2/3         NT 2/3                                      Physical Performance Test             Time       Score   Write sentence 18   2   Simulated  eating 21.21 1    Lift book 3.7 (blue binder) 3     Jacket   32 1                                            Violetta 4.5 2                                    "     Turn 360 2 0                                    50 foot walk  16.2 3      9 point scale:   - 32-36= no impairment  - 25-32= mild   - 17-24 moderate  - < 17 = unable to function in community.  7 point scale:  - < 19.4 mild  - 32-36 not frail.        UE Strength:              MALCOM: RUE\" 72lb (previously 92 lbs) LUE: 74lb (previously 95 lbs)  The age norm is approximately 55-65 lbs and indicating normal  strength  Pt is R hand dominant  NT 2/3     Range of Motion:  AROM:   BUE within normal limits     MMT: NT 2/3  R UE: 4+/5 in all pivots      L UE 4+/5 in all pivots      Pt is L hand dominant     GOALS:   Short Term Goals: 4-6 weeks   Pt will increase delayed recall and recall 2/5 items on MOCA to complete IADLs No change  Pt will demonstrate improved functional cognition as evidenced by improving score on the MoCA to 19/30 to improve performance during ADLs and IADLs Goal met 2/3  Pt will increase FMC by 10 seconds bilaterally on 9 hole peg to complete ADLs and IADLs NT  Pt will demonstrate independence with UE strengthening HEP as per patient report Progressing  Pt will increase LE strength to complete 30 second STS with 9 reps for IADLs NT  Pt will increase dual tasking to complete TUG cog by 3-4 math equations in 10 seconds for IADLs NT  Pt will increase PPT to 15 points overall for IADLs NT      Long Term Goals: 8-12 weeks   Pt will improve functional endurance to engage in daily tasks or yard works without complaints of fatigue. No change  Pt will will improve social participation and engage in the RSB program on a weekly basis to assist with functional mobility and endurance. Goal met 2/3, discontinued as Pt changed care to [place] and will receive individual RSB intervention during PT  Pt will improve performance with fasteners for clothing management as per patient report. Progressing  Pt will improve UE strength to 5/5 in all planes to improve performance with ADLs/IADLs NT  Pt will increase LE " strength to complete 30 second STS with 10 reps for IADLs NT  Pt will increase dual tasking to complete TUG cog by 4 math equations in 9.7 seconds for IADLs NT  Pt will increase PPT to 17 points overall for IADLs NT    OTHER PLANNED THERAPY INTERVENTIONS:   Supine, seated, and in stance neuro re-ed  FMC/prehension  Timed Trials  Hand to target  Seated functional reach: crossing midline  Closed chain activities  Open chain activities  Internal and external memory aides  Multimatrix for saccades/ visual clutter/attention  Hypersensitivity strategies education  Multi-modal environment  Sustained/alternating/divided attention  Tracking tube  Oculomotor control:  saccades, con/divergence  Conv./div. Dynamic tasks  Work stations with timed transitions  Temporal Awareness  Memory and mental manipulation  Auditory processing with immediate recall  Memory retention with immediate and delayed recall  Edu on cog/vision apps

## 2025-02-05 ENCOUNTER — EVALUATION (OUTPATIENT)
Facility: CLINIC | Age: 75
End: 2025-02-05
Payer: MEDICARE

## 2025-02-05 ENCOUNTER — OFFICE VISIT (OUTPATIENT)
Facility: CLINIC | Age: 75
End: 2025-02-05
Payer: MEDICARE

## 2025-02-05 VITALS — SYSTOLIC BLOOD PRESSURE: 128 MMHG | DIASTOLIC BLOOD PRESSURE: 63 MMHG

## 2025-02-05 DIAGNOSIS — G20.A1 PARKINSON'S DISEASE WITHOUT FLUCTUATING MANIFESTATIONS, UNSPECIFIED WHETHER DYSKINESIA PRESENT (HCC): Primary | ICD-10-CM

## 2025-02-05 DIAGNOSIS — G20.A1 PARKINSON'S DISEASE, UNSPECIFIED WHETHER DYSKINESIA PRESENT, UNSPECIFIED WHETHER MANIFESTATIONS FLUCTUATE (HCC): Primary | ICD-10-CM

## 2025-02-05 PROCEDURE — 97112 NEUROMUSCULAR REEDUCATION: CPT

## 2025-02-05 PROCEDURE — 97530 THERAPEUTIC ACTIVITIES: CPT

## 2025-02-05 PROCEDURE — 97162 PT EVAL MOD COMPLEX 30 MIN: CPT | Performed by: PHYSICAL THERAPIST

## 2025-02-05 NOTE — LETTER
2025    Apolinar Carr MD  [address]  [address]  [facility] [address]    Patient: Femi Acosta   YOB: 1950   Date of Visit: 2025     Encounter Diagnosis     ICD-10-CM    1. Parkinson's disease, unspecified whether dyskinesia present, unspecified whether manifestations fluctuate (HCC)  G20.A1           Dear Dr. Carr:    Thank you for your recent referral of Femi Acosta. Please review the attached evaluation summary from Femi's recent visit.     Please verify that you agree with the plan of care by signing the attached order.     If you have any questions or concerns, please do not hesitate to call.     I sincerely appreciate the opportunity to share in the care of one of your patients and hope to have another opportunity to work with you in the near future.       Sincerely,    Reji Sandoval Jr, PT      Referring Provider:      I certify that I have read the below Plan of Care and certify the need for these services furnished under this plan of treatment while under my care.                    Apolinar Carr MD  [address]  [address]  [facility] [address]  Via Fax: [phone]          PT Evaluation     Today's date: 2025  Patient name: Femi Acosta  : 1950  MRN: [id number]  Referring provider: Apolinar Carr MD  Dx:   Encounter Diagnosis     ICD-10-CM    1. Parkinson's disease, unspecified whether dyskinesia present, unspecified whether manifestations fluctuate (HCC)  G20.A1                 Assessment  Assessment details: Patient is a 74 y.o. Male who presents to skilled outpatient PT with complaints of gait instability and balance deficits resulting in falls. Patient arrives to evaluation with wife who assists with patient history. He is currently receiveing Occupational therapy and will be having Speech therapy evaluation to further assess possible deficits. During testing, patient scores WNL for his 5xSTS, TUG/COG/Carry, and 10 MWT, suggesting appropriate  functional LE strength, normative gait speed, and low risk of falls per TUG score. He scores below normative value for his 6 MWT suggesting reduced functional endurance with the following gait deviations observed: reduced UE swing and anterior trunk lean. Patient is interested in the following program to address noted deficits: [employer] Ste[employer] Boxing. Per research provided by [employer], patient will benefit from skilled outpatient PT services to improve and maximize his/her function, to reduce risk for falls and potential injuries associated with falls, reduce healthcare costs via hospitalization, and reduce patient and national healthcare costs. In early stages of Parkinson's Disease, research indicates that intensive physical exercise can improve patient's motor control and assist in slowing the disease progression as a neuroprotective agent. Patient will benefit from skilled outpatient PT in order to maximize function in the short-term and long-term with overall improved postural strength with associated stability and mobility.      Impairments: Abnormal gait, Activity intolerance, Impaired balance, Impaired physical strength, Lacks appropriate HEP, Poor posture, Poor body mechanics, Safety issue  Understanding of Dx/Px/POC: Good  Prognosis: Good      Patient verbalized understanding of POC.    Please contact me if you have any questions or recommendations. Thank you for the referral and the opportunity to share in Femi Acosta's care.           Plan  Plan details: miniBEST  Program: [facility] Ste[employer] [facility]ing  Planned modality interventions: Biofeedback, Cryotherapy, TENS, Thermotherapy  Planned therapy interventions: Abdominal trunk stabilization, ADL training, Balance, Balance/WB training, Breathing training, Body mechanics training, Coordination, Functional ROM exercises, Gait training, HEP, Joint Mobilization, Manual Therapy, Saleem taping, Motor coordination training, Neuromuscular re-education, Patient education,  Postural training, Strengthening, Stretching, Therapeutic activities, Therapeutic exercises, Therapeutic training, Transfer training, Activity modification, Work reintegration  Frequency: 2x/wk  Duration in weeks: 12  Plan of Care beginning date: 2/5/2025  Plan of Care expiration date: 12 weeks - 4/30/2025  Treatment plan discussed with: Patient         Goals  Short Term Goals (4 weeks):  - Patient will improve TUG score by MDC of 4.8 sec in order to reduce risk of falls and to increase safety with functional mobility  - Patient will improve 5 STS by the MDC of 2.3 sec indicating an improvement in strength and decreased challenge with transfers  - Patient will improve 6 MWT by the MDC of 269 ft indicating an improvement in cardiovascular endurance in order to maximize function with functional mobility throughout the community  - Patient will improve MiniBESTest score by MDC of 5.52 points indicating an improvement with dynamic balance in order to further decrease risk of falls with functional tasks  - Patient will be independent in basic HEP in order to manage condition at home    Long Term Goals (12 weeks):  - Patient will score a low risk for falls 2/4 fall risks measures  - Patient will score age norm values for 1/2 endurance measures  - Patient will be able to ambulate on uneven surfaces with 50% reduction in near falls to increase safety with community mobility  - Patient will be able to perform a floor transfer without physical assistance to assist with fall recovery at home and in the community  - Patient will be independent in comprehensive HEP post discharge from program  - Patient will be able to walk up incline with decreased difficulty and no loss of balance in order to improve functional mobility throughout the community  - Patient will be able to perform sit to stands from softer surfaces such as a couch or bed in order to improvement function with transfers  - Patient will be consistent with program  for at least 1 day per week to assist with management of condition and functional independence of their condition        Cut off score   All date taken from [facility] Neuro Section or Rehab Measures      ALONSO Cutoff Scores:  MDC: 5 pts  Falls Risk Cutoff: 40.22/56 DGI Cutoff Scores:  Luke et al 2011, MDC: 2.9 pts  Harika et al 2008, Artie: Score <19/24   MiniBEST Cutoff Scores:  Ambrosio et al 2011, MDC: 5.52 pts  Zaki and Randell 2013, Artie: <19/28 5xSTS Cutoff Scores:  MDC: 2.3 sec  Flaco et al, 2011, Artie: > 16 sec   TUG Cutoff Scores:  Demetrius Rivera et al, 2011, MDC: 4.8 sec  Julio Cesar et al, 2011, Fallers: Meds ON: < 12.21 sec, OFF: 15.5 sec 10 Meter Walk Test Cutoff Scores:  Jevon, 2008, MDC: 0.18 m/s  Age Norm Values, Artie: < 1.0 m/s   ABC Cutoff Scores:  Allyson, 2008, MDC: 13 pts  Demetrius Quinn, MDC: 11.12 pts  Increased risk for falls: < 69% 6 Minute Walk Test Cutoff Scores:  Jevon, 2008, MDC: 269 ft  Oleg et al, 2002, Norm Data Healthy Adults:  60 - 69 years:   M: 572 m      F: 538 m  70 - 79 years:   M: 527 m      F: 471 m  80 - 89 years:   M: 417 m      F: 392 m   PDQ-39 Cutoff Scores:  Abhishek et al, 2004:  MDC: Mobility (12.24), ADL (16.72), Emotional (14.22), Stigma (21.21), Social Support (21.25), Cognition (21.12), Communication (21.04), Bodily Discomfort (24.48)  Sarthak et al, 2007:  Normative Data: Mobility (49.25), ADL (38.94), Emotional (37.92), Stigma (27.54), Social Support (14.78), Cognition (33.03), Communication (27.99), Bodily Discomfort (40.91) Deangelo and Yahr Stages  Stage 0: No S/S  Stage 1: Mild unilateral symptoms  Stage 1.5: Unilateral and axial symptoms  Stage 2: Bilateral symptoms, no balance impairment  Stage 2.5: Mild bilateral symptoms and recovery with pull test  Stage 3: Mild to moderate postural instability, independent  Stage 4: Severe disability, walking or standing unassisted  Stage 5: Bed bound, w/c bound           Subjective  Evaluation    History of Present Illness  Mechanism of injury: Patient arrives with wife for evaluation. He was diagnosed with PD approximately 3-4 years ago, they are interested in neuro boxing program. He was recently seen by [facility] for PT, was seeing OT in [place] but would like to change all services to [place] location to be closer to home. Patient has had 1 fall in the last month. Difficulties with dual tasking  Primary AD: none    Patient goal: reduce risk of falls, improve seated/standing from chairs    Pain  Current pain ratin/10    Social Support  Steps to enter house: 3-4 steps  Stairs in house: 1 flight   Lives in: wife  Lives with: house    Employment status: Retired    Treatments  Previous treatment: PT, OT  Current treatment: OT      Objective   Gait abnormalities: reduced UE swing, anterior trunk lean        Outcome Measures Initial Eval  2025        5xSTS 15.3 sec        TUG  - Regular  - Cognitive  - Carry   9.1 sec  9.6 sec  9.4 sec        MiniBEST /28        10 meter 1.28 m/s        6MWT 1535 ft        ABC 68.75%        PDQ-39 /100        MDS-UPDRS  Part III   /128        H&Y Stage         Floor > Stand  sec                      Precautions:   Past Medical History:   Diagnosis Date   • Allergic rhinitis    • Constipation    • GERD (gastroesophageal reflux disease)    • Hyperlipidemia    • Overactive bladder    • Parkinson disease (HCC)    • Prostate enlargement

## 2025-02-05 NOTE — PROGRESS NOTES
"Daily Note     Today's date: 2025  Patient name: Femi Acosta  : 1950  MRN: [id number]  Referring provider: Apolinar Carr MD  Dx:   Encounter Diagnosis   Name Primary?    Parkinson's disease without fluctuating manifestations, unspecified whether dyskinesia present (HCC) Yes       Start Time: 935  Stop Time: 1018  Total time in clinic (min): 43 minutes    PLAN OF CARE START:24  PLAN OF CARE END: 3/13/25  FREQUENCY: 2x/week  Precautions: FALL SAFETY,     Subjective: Pt reports that his doctor recommended cataracts surgery. Wife reports that he has tried various prisms which have not helped his double vision in the past, but they are seeing a new neuro ophthalmologist soon    Objective: See treatment below.    TA/NMR:   - squigz activity placing them on whiteboard and naming category based on shape, requiring repeat of directions and categories to begin however cues faded towards the end as recall improved;  working on working memory, FMC, dexterity                                         VISION SCREEN             Glasses (distance)     Comments/symptom exacerbation:        Hx of cataracts in B eyes, did not schedule surgery yet   Symptoms include  Double vision, especially when reading   Last eye exam   2024           Visual Acuity (near) OS:20/200  OD: 20/100 \"Very double and foggy\"    Binocularity (near) Cover test: orthotropia, orthophoria  Cross cover test: orthotropia, orthophoria  \"double\"   Binocularity (far) OS: orthotropia orthophoria  OD: orthotropia orthophoria  \"double\"   Near point convergence Multiple attempts, difficulty fixating - first attempt 23 inches, second 30 inches, third 15 inches \"Double, fuzzy\"   Mina string  Severe difficulty fixating and following directions - OS misalignment, unable to verbalize X/Y/I    Red/green fusion light      Pursuits Mildly jerky    Saccades Difficulty fixating, overall minimal over/undershooting \"foggy and dark\"   Range of motion WNL "     Visual perceptual midline test     Visual field testing          Assessment: Tolerated treatment well. Pts visual impairments appear to mostly be related to his cataracts; he has trouble with fixation due to c/o foggy/blurry vision, however also demonstrated severe difficulty with convergence. Pt and wife were advised to follow up with ophthalmologist regarding cataracts impacting functional tasks such as reading. Pt demonstrating slow processing speed, bradykinesia, decreased insight into cognitive deficits.  Pt would benefit from continued OT services to address functional cognition, endurance, LE strength, FMC and dexterity.      Plan: Continued skilled OT per POC.      NEW GOALS 12/13/2024  STGs in 4-6 weeks   Pt will increase LE strength to complete 30 second STS with 9 reps for IADLs  Pt will increase dual tasking to complete TUG cog by 3-4 math equations in 10 seconds for IADLs  Pt will increase PPT to 15 points overall for IADLs    LTGs in 8-12 weeks  Pt will increase LE strength to complete 30 second STS with 10 reps for IADLs  Pt will increase dual tasking to complete TUG cog by 4 math equations in 9.7 seconds for IADLs  Pt will increase PPT to 17 points overall for IADLs    Pt will increase dynamic standing balance to 22/28 on minibest  for IADLs in 4 weeks  Pt will increase dynamic standing balance to 24/28 on minibest  for IADLs in 8-12 weeks

## 2025-02-05 NOTE — PROGRESS NOTES
PT Evaluation     Today's date: 2025  Patient name: Femi Acosta  : 1950  MRN: [id number]  Referring provider: Apolinar Carr MD  Dx:   Encounter Diagnosis     ICD-10-CM    1. Parkinson's disease, unspecified whether dyskinesia present, unspecified whether manifestations fluctuate ([facility])  G20.A1                 Assessment  Assessment details: Patient is a 74 y.o. Male who presents to skilled outpatient PT with complaints of gait instability and balance deficits resulting in falls. Patient arrives to evaluation with wife who assists with patient history. He is currently receiveing Occupational therapy and will be having Speech therapy evaluation to further assess possible deficits. During testing, patient scores WNL for his 5xSTS, TUG/COG/Carry, and 10 MWT, suggesting appropriate functional LE strength, normative gait speed, and low risk of falls per TUG score. He scores below normative value for his 6 MWT suggesting reduced functional endurance with the following gait deviations observed: reduced UE swing and anterior trunk lean. Patient is interested in the following program to address noted deficits: [employer] [facility]ing. Per research provided by [employer], patient will benefit from skilled outpatient PT services to improve and maximize his/her function, to reduce risk for falls and potential injuries associated with falls, reduce healthcare costs via hospitalization, and reduce patient and national healthcare costs. In early stages of Parkinson's Disease, research indicates that intensive physical exercise can improve patient's motor control and assist in slowing the disease progression as a neuroprotective agent. Patient will benefit from skilled outpatient PT in order to maximize function in the short-term and long-term with overall improved postural strength with associated stability and mobility.      Impairments: Abnormal gait, Activity intolerance, Impaired balance, Impaired physical strength, Lacks  appropriate HEP, Poor posture, Poor body mechanics, Safety issue  Understanding of Dx/Px/POC: Good  Prognosis: Good      Patient verbalized understanding of POC.    Please contact me if you have any questions or recommendations. Thank you for the referral and the opportunity to share in Femi Acosta's care.           Plan  Plan details: miniBEST  Program: [employer] [facility]  Planned modality interventions: Biofeedback, Cryotherapy, TENS, Thermotherapy  Planned therapy interventions: Abdominal trunk stabilization, ADL training, Balance, Balance/WB training, Breathing training, Body mechanics training, Coordination, Functional ROM exercises, Gait training, HEP, Joint Mobilization, Manual Therapy, Saleem taping, Motor coordination training, Neuromuscular re-education, Patient education, Postural training, Strengthening, Stretching, Therapeutic activities, Therapeutic exercises, Therapeutic training, Transfer training, Activity modification, Work reintegration  Frequency: 2x/wk  Duration in weeks: 12  Plan of Care beginning date: 2/5/2025  Plan of Care expiration date: 12 weeks - 4/30/2025  Treatment plan discussed with: Patient         Goals  Short Term Goals (4 weeks):  - Patient will improve TUG score by MDC of 4.8 sec in order to reduce risk of falls and to increase safety with functional mobility  - Patient will improve 5 STS by the MDC of 2.3 sec indicating an improvement in strength and decreased challenge with transfers  - Patient will improve 6 MWT by the MDC of 269 ft indicating an improvement in cardiovascular endurance in order to maximize function with functional mobility throughout the community  - Patient will improve MiniBESTest score by MDC of 5.52 points indicating an improvement with dynamic balance in order to further decrease risk of falls with functional tasks  - Patient will be independent in basic HEP in order to manage condition at home    Long Term Goals (12 weeks):  - Patient will score a  low risk for falls 2/4 fall risks measures  - Patient will score age norm values for 1/2 endurance measures  - Patient will be able to ambulate on uneven surfaces with 50% reduction in near falls to increase safety with community mobility  - Patient will be able to perform a floor transfer without physical assistance to assist with fall recovery at home and in the community  - Patient will be independent in comprehensive HEP post discharge from program  - Patient will be able to walk up incline with decreased difficulty and no loss of balance in order to improve functional mobility throughout the community  - Patient will be able to perform sit to stands from softer surfaces such as a couch or bed in order to improvement function with transfers  - Patient will be consistent with program for at least 1 day per week to assist with management of condition and functional independence of their condition        Cut off score   All date taken from [facility] Neuro Section or Rehab Measures      ALONSO Cutoff Scores:  MDC: 5 pts  Falls Risk Cutoff: 40.22/56 DGI Cutoff Scores:  Luke et al 2011, MDC: 2.9 pts  Harika et al 2008, Artie: Score <19/24   MiniBEST Cutoff Scores:  Ambrosio et al 2011, MDC: 5.52 pts  Zion 2013, Artie: <19/28 5xSTS Cutoff Scores:  MDC: 2.3 sec  Flaco et al, 2011, Artie: > 16 sec   TUG Cutoff Scores:  Demetrius Rivera et al, 2011, MDC: 4.8 sec  Julio Cesar et al, 2011, Fallers: Meds ON: < 12.21 sec, OFF: 15.5 sec 10 Meter Walk Test Cutoff Scores:  Jevon, 2008, MDC: 0.18 m/s  Age Norm Values, Artie: < 1.0 m/s   ABC Cutoff Scores:  Allyson, 2008, MDC: 13 pts  Demetrius Quinn, MDC: 11.12 pts  Increased risk for falls: < 69% 6 Minute Walk Test Cutoff Scores:  Jevon, 2008, MDC: 269 ft  Oleg et al, 2002, Norm Data Healthy Adults:  60 - 69 years:   M: 572 m      F: 538 m  70 - 79 years:   M: 527 m      F: 471 m  80 - 89 years:   M: 417 m      F: 392 m   PDQ-39 Cutoff  Scores:  Abhishek et al, 2004:  MDC: Mobility (12.24), ADL (16.72), Emotional (14.22), Stigma (21.21), Social Support (21.25), Cognition (21.12), Communication (21.04), Bodily Discomfort (24.48)  Sarthak et al, 2007:  Normative Data: Mobility (49.25), ADL (38.94), Emotional (37.92), Stigma (27.54), Social Support (14.78), Cognition (33.03), Communication (27.99), Bodily Discomfort (40.91) Deangelo and Yahr Stages  Stage 0: No S/S  Stage 1: Mild unilateral symptoms  Stage 1.5: Unilateral and axial symptoms  Stage 2: Bilateral symptoms, no balance impairment  Stage 2.5: Mild bilateral symptoms and recovery with pull test  Stage 3: Mild to moderate postural instability, independent  Stage 4: Severe disability, walking or standing unassisted  Stage 5: Bed bound, w/c bound           Subjective Evaluation    History of Present Illness  Mechanism of injury: Patient arrives with wife for evaluation. He was diagnosed with PD approximately 3-4 years ago, they are interested in neuro boxing program. He was recently seen by angelo for PT, was seeing OT in [place] but would like to change all services to [place] location to be closer to home. Patient has had 1 fall in the last month. Difficulties with dual tasking  Primary AD: none    Patient goal: reduce risk of falls, improve seated/standing from chairs    Pain  Current pain ratin/10    Social Support  Steps to enter house: 3-4 steps  Stairs in house: 1 flight   Lives in: wife  Lives with: house    Employment status: Retired    Treatments  Previous treatment: PT, OT  Current treatment: OT      Objective   Gait abnormalities: reduced UE swing, anterior trunk lean        Outcome Measures Initial Eval  2025        5xSTS 15.3 sec        TUG  - Regular  - Cognitive  - Carry   9.1 sec  9.6 sec  9.4 sec        MiniBEST /28        10 meter 1.28 m/s        6MWT 1535 ft        ABC 68.75%        PDQ-39 /100        MDS-UPDRS  Part III   /128        H&Y Stage          Floor > Stand  sec                      Precautions:   Past Medical History:   Diagnosis Date    Allergic rhinitis     Constipation     GERD (gastroesophageal reflux disease)     Hyperlipidemia     Overactive bladder     Parkinson disease (HCC)     Prostate enlargement

## 2025-02-12 ENCOUNTER — OFFICE VISIT (OUTPATIENT)
Facility: CLINIC | Age: 75
End: 2025-02-12
Payer: MEDICARE

## 2025-02-12 DIAGNOSIS — G20.A1 PARKINSON'S DISEASE, UNSPECIFIED WHETHER DYSKINESIA PRESENT, UNSPECIFIED WHETHER MANIFESTATIONS FLUCTUATE (HCC): Primary | ICD-10-CM

## 2025-02-12 DIAGNOSIS — G20.A1 PARKINSON'S DISEASE WITHOUT FLUCTUATING MANIFESTATIONS, UNSPECIFIED WHETHER DYSKINESIA PRESENT (HCC): Primary | ICD-10-CM

## 2025-02-12 PROCEDURE — 97112 NEUROMUSCULAR REEDUCATION: CPT

## 2025-02-12 PROCEDURE — 97112 NEUROMUSCULAR REEDUCATION: CPT | Performed by: PHYSICAL THERAPIST

## 2025-02-12 PROCEDURE — 97530 THERAPEUTIC ACTIVITIES: CPT

## 2025-02-12 PROCEDURE — 97530 THERAPEUTIC ACTIVITIES: CPT | Performed by: PHYSICAL THERAPIST

## 2025-02-12 NOTE — PROGRESS NOTES
Daily Note     Today's date: 2025  Patient name: Femi Acosta  : 1950  MRN: [id number]  Referring provider: Apolinar Carr MD  Dx:   Encounter Diagnosis     ICD-10-CM    1. Parkinson's disease, unspecified whether dyskinesia present, unspecified whether manifestations fluctuate ([facility])  G20.A1                      Subjective: Patient arrives to treatment with a lot of questions regarding testing      Objective: See treatment diary below      Assessment: Patient tolerated treatment well. Patient education provided regarding testing process and objective measures along with role of PT in treatment for neuro boxing program. Patient requires frequent visual and verbal cueing for appropriate punch combination recall. Plan to increase circuits as tolerated and add bed mobility training. Patient will benefit from skilled PT services.       Plan: Continue per plan of care.        Outcome Measures Initial Eval  2025        5xSTS 15.3 sec        TUG  - Regular  - Cognitive  - Carry   9.1 sec  9.6 sec  9.4 sec        MiniBEST /28        10 meter 1.28 m/s        6MWT 1535 ft        ABC 68.75%        PDQ-39 /100        MDS-UPDRS  Part III   /128        H&Y Stage         Floor > Stand  sec                     Date 25        Visit # 2                 NMR         Neuro Boxing Warm up - Marching  - Toe walking  - Heel walking  - Lateral jacks        Shadow boxing 1, 2, 3, 4, 5, 6,         Circuit 1 - STS > combos  - FWD hurdles > combos  - LAT hurdles > combos  (2 sets, 2 min ea)                 TA         Patient education - Testing and objective measures  - Role of PT  - Neuro Boxing program

## 2025-02-12 NOTE — PROGRESS NOTES
Daily Note     Today's date: 2025  Patient name: Femi Acosta  : 1950  MRN: [id number]  Referring provider: Apolinar Carr MD  Dx:   Encounter Diagnosis   Name Primary?    Parkinson's disease without fluctuating manifestations, unspecified whether dyskinesia present (HCC) Yes         Start Time: 1230  Stop Time: 1315  Total time in clinic (min): 45 minutes    PLAN OF CARE START:24  PLAN OF CARE END: 3/13/25  FREQUENCY: 2x/week  Precautions: FALL SAFETY,     Subjective: Pts wife reports he is having difficulty getting in and out of bed along with rolling in bed.    Objective: See treatment below.    TA/NMR:   - connect 4 activity performed matching template with pieces positioned to encouraged amplified reaching. Required mod cues to initiate row 1, completed row 2 with increased time, then by row 3 added a handwriting component to write words beginning with the given letter on a whiteboard to work on handwriting, FMC,  skills, working memory, divided attention    Assessment: Tolerated treatment well. Therapists discussed with PT Marv about Pts difficulty with bed mobility with plan to address in future sessions. Pt had difficulty with divided attention and working memory today during connect 4 activity, requiring min>mod cues throughout to recall instructions and remember his place on the template. Pt demonstrating slow processing speed, bradykinesia, decreased insight into cognitive deficits.  Pt would benefit from continued OT services to address functional cognition, endurance, LE strength, FMC and dexterity.      Plan: Continued skilled OT per POC.      NEW GOALS 2024  STGs in 4-6 weeks   Pt will increase LE strength to complete 30 second STS with 9 reps for IADLs  Pt will increase dual tasking to complete TUG cog by 3-4 math equations in 10 seconds for IADLs  Pt will increase PPT to 15 points overall for IADLs    LTGs in 8-12 weeks  Pt will increase LE strength to complete 30 second  STS with 10 reps for IADLs  Pt will increase dual tasking to complete TUG cog by 4 math equations in 9.7 seconds for IADLs  Pt will increase PPT to 17 points overall for IADLs    Pt will increase dynamic standing balance to 22/28 on minibest  for IADLs in 4 weeks  Pt will increase dynamic standing balance to 24/28 on minibest  for IADLs in 8-12 weeks

## 2025-02-19 ENCOUNTER — OFFICE VISIT (OUTPATIENT)
Facility: CLINIC | Age: 75
End: 2025-02-19
Payer: MEDICARE

## 2025-02-19 DIAGNOSIS — G20.A1 PARKINSON'S DISEASE, UNSPECIFIED WHETHER DYSKINESIA PRESENT, UNSPECIFIED WHETHER MANIFESTATIONS FLUCTUATE (HCC): Primary | ICD-10-CM

## 2025-02-19 PROCEDURE — 97530 THERAPEUTIC ACTIVITIES: CPT | Performed by: PHYSICAL THERAPIST

## 2025-02-19 PROCEDURE — 97112 NEUROMUSCULAR REEDUCATION: CPT | Performed by: PHYSICAL THERAPIST

## 2025-02-19 NOTE — PROGRESS NOTES
Daily Note     Today's date: 2025  Patient name: Femi Acosta  : 1950  MRN: [id number]  Referring provider: Apolinar Carr MD  Dx:   Encounter Diagnosis     ICD-10-CM    1. Parkinson's disease, unspecified whether dyskinesia present, unspecified whether manifestations fluctuate ([facility])  G20.A1                      Subjective: Patient arrives to treatment wondering who has access to his medical records      Objective: See treatment diary below      Assessment: Patient tolerated treatment well. Continued with neuro boxing program today, focusing on using agility ladder to challenge patient dynamic balance and LE coordination. Patient challenged with maintaining appropriate stepping pattern, improving with visual cueing. Per request, finished treatment with focus on bed mobility including supine to seated transfers and rolling. PT cueing provided to assist with appropriate sequencing during bed mobility. Patient will benefit from skilled PT services.       Plan: Continue per plan of care.        Outcome Measures Initial Eval  2025        5xSTS 15.3 sec        TUG  - Regular  - Cognitive  - Carry   9.1 sec  9.6 sec  9.4 sec        MiniBEST /28        10 meter 1.28 m/s        6MWT 1535 ft        ABC 68.75%        PDQ-39 /100        MDS-UPDRS  Part III   /128        H&Y Stage         Floor > Stand  sec                     Date 25       Visit # 2 3                NMR         Neuro Boxing Warm up - Marching  - Toe walking  - Heel walking  - Lateral jacks - Marching  - Toe walking  - Heel walking  - Lateral jacks       Shadow boxing 1, 2, 3, 4, 5, 6,  1, 2, 3, 4, 5, 6       Circuit 1 - STS > combos  - FWD hurdles > combos  - LAT hurdles > combos  (2 sets, 2 min ea) Agility ladder  - Double leg in/out FWD > combos  - Double leg in LAT > combos  - Single leg FWD > combos    (2 sets, 2-3 min ea)                TA         Patient education - Testing and objective measures  - Role of PT  - Neuro Boxing  program        Bed mobility  Seated > supine  Supine > seated  B/L rolling

## 2025-02-21 ENCOUNTER — APPOINTMENT (OUTPATIENT)
Facility: CLINIC | Age: 75
End: 2025-02-21
Payer: MEDICARE

## 2025-02-24 ENCOUNTER — APPOINTMENT (OUTPATIENT)
Facility: CLINIC | Age: 75
End: 2025-02-24
Payer: MEDICARE

## 2025-02-26 ENCOUNTER — OFFICE VISIT (OUTPATIENT)
Facility: CLINIC | Age: 75
End: 2025-02-26
Payer: MEDICARE

## 2025-02-26 DIAGNOSIS — G20.A1 PARKINSON'S DISEASE, UNSPECIFIED WHETHER DYSKINESIA PRESENT, UNSPECIFIED WHETHER MANIFESTATIONS FLUCTUATE (HCC): Primary | ICD-10-CM

## 2025-02-26 DIAGNOSIS — G20.A1 PARKINSON'S DISEASE WITHOUT FLUCTUATING MANIFESTATIONS, UNSPECIFIED WHETHER DYSKINESIA PRESENT (HCC): Primary | ICD-10-CM

## 2025-02-26 PROCEDURE — 97112 NEUROMUSCULAR REEDUCATION: CPT | Performed by: PHYSICAL THERAPIST

## 2025-02-26 PROCEDURE — 97530 THERAPEUTIC ACTIVITIES: CPT

## 2025-02-26 PROCEDURE — 97112 NEUROMUSCULAR REEDUCATION: CPT

## 2025-02-26 NOTE — PROGRESS NOTES
"Daily Note     Today's date: 2025  Patient name: Femi Acosta  : 1950  MRN: [id number]  Referring provider: Apolinar Carr MD  Dx:   Encounter Diagnosis   Name Primary?    Parkinson's disease without fluctuating manifestations, unspecified whether dyskinesia present (HCC) Yes         Start Time: 930  Stop Time: 1015  Total time in clinic (min): 45 minutes    PLAN OF CARE START:24  PLAN OF CARE END: 3/13/25  FREQUENCY: 2x/week  Precautions: FALL SAFETY,     Subjective: Pt reports that he does not want any testing to impact his ability to get his drivers license. Lengthy discussion had with Pts wife about Pts driving, \"I want him to little by little start driving again in familiar places\"    Objective: See treatment below.    TA/NMR:   - trail making in powerwebs with marbles with naming component to work on alternating/divided attention, sequencing, problem solving, following multistep directions. Requiring mod>max cues to begin, fading as he completed this    TA:  - lengthy discussion with Pts wife regarding her desires to allow him to drive again, therapist informed wife of concerns relating to cognition, processing speed, and visual perception which may impact safety on the road. Informed Pts wife that he can take fitness to drive assessment if a script is obtained from MD however Pts wife does not want him to take this and wants to wait until he sees the movement disorder specialist from [facility] prior to this.     Assessment: Tolerated treatment well. Lengthy discussion was had with Pts wife and therapist provided EDU on Pts deficits which may impact safety on the road, however wife not receptive. Pt demonstrating slow processing speed, bradykinesia, decreased insight into cognitive deficits.  Pt would benefit from continued OT services to address functional cognition, endurance, LE strength, FMC and dexterity.      Plan: Continued skilled OT per POC.      NEW GOALS 2024  STGs in 4-6 weeks "   Pt will increase LE strength to complete 30 second STS with 9 reps for IADLs  Pt will increase dual tasking to complete TUG cog by 3-4 math equations in 10 seconds for IADLs  Pt will increase PPT to 15 points overall for IADLs    LTGs in 8-12 weeks  Pt will increase LE strength to complete 30 second STS with 10 reps for IADLs  Pt will increase dual tasking to complete TUG cog by 4 math equations in 9.7 seconds for IADLs  Pt will increase PPT to 17 points overall for IADLs    Pt will increase dynamic standing balance to 22/28 on minibest  for IADLs in 4 weeks  Pt will increase dynamic standing balance to 24/28 on minibest  for IADLs in 8-12 weeks

## 2025-02-26 NOTE — PROGRESS NOTES
Daily Note     Today's date: 2025  Patient name: Femi Acosta  : 1950  MRN: [id number]  Referring provider: Apolinar Carr MD  Dx:   Encounter Diagnosis     ICD-10-CM    1. Parkinson's disease, unspecified whether dyskinesia present, unspecified whether manifestations fluctuate ([facility])  G20.A1                      Subjective: Patient arrives to treatment reporting no changes since his last visit      Objective: See treatment diary below      Assessment: Patient tolerated treatment well. Continued with neuro boxing program today, focusing on dynamic balance with addition of unstable foam surface. Occasional UE support or use of stepping strategy utilized with LOB on foam. Patient required additional visual and verbal cueing for appropriate recall of punch combinations. Finished treatment with core mobility, focusing on improving rotation. Plan to progress as tolerated. Patient will benefit from skilled PT services.       Plan: Continue per plan of care.        Outcome Measures Initial Eval  2025        5xSTS 15.3 sec        TUG  - Regular  - Cognitive  - Carry   9.1 sec  9.6 sec  9.4 sec        MiniBEST /28        10 meter 1.28 m/s        6MWT 1535 ft        ABC 68.75%        PDQ-39 /100        MDS-UPDRS  Part III   /128        H&Y Stage         Floor > Stand  sec                     Date 25      Visit # 2 3 4               NMR         Neuro Boxing Warm up - Marching  - Toe walking  - Heel walking  - Lateral jacks - Marching  - Toe walking  - Heel walking  - Lateral jacks - Marching  - Toe walking  - Heel walking  - Lateral jacks      Shadow boxing 1, 2, 3, 4, 5, 6,  1, 2, 3, 4, 5, 6 1, 2, 3, 4, 5, 6      Circuit 1 - STS > combos  - FWD hurdles > combos  - LAT hurdles > combos  (2 sets, 2 min ea) Agility ladder  - Double leg in/out FWD > combos  - Double leg in LAT > combos  - Single leg FWD > combos    (2 sets, 2-3 min ea) - Sidestepping on foam > combos  - Tandem stepping on foam >  "combos  - Marching on foam > combos    (2 sets, 2-3 min ea)      Circuit 2   - Standing trunk twist  - Standing \"open books\"    20x ea, 3 sec holds      TA         Patient education - Testing and objective measures  - Role of PT  - Neuro Boxing program        Bed mobility  Seated > supine  Supine > seated  B/L rolling                     "

## 2025-03-03 ENCOUNTER — OFFICE VISIT (OUTPATIENT)
Facility: CLINIC | Age: 75
End: 2025-03-03
Payer: MEDICARE

## 2025-03-03 DIAGNOSIS — G20.A1 PARKINSON'S DISEASE WITHOUT FLUCTUATING MANIFESTATIONS, UNSPECIFIED WHETHER DYSKINESIA PRESENT (HCC): Primary | ICD-10-CM

## 2025-03-03 PROCEDURE — 97530 THERAPEUTIC ACTIVITIES: CPT

## 2025-03-03 PROCEDURE — 97112 NEUROMUSCULAR REEDUCATION: CPT

## 2025-03-03 NOTE — PROGRESS NOTES
"Daily Note     Today's date: 3/3/2025  Patient name: Femi Acosta  : 1950  MRN: [id number]  Referring provider: Apolinar Carr MD  Dx:   Encounter Diagnosis   Name Primary?    Parkinson's disease without fluctuating manifestations, unspecified whether dyskinesia present (HCC) Yes           Start Time: 0840  Stop Time: 09  Total time in clinic (min): 45 minutes    PLAN OF CARE START:24  PLAN OF CARE END: 3/13/25  FREQUENCY: 2x/week  Precautions: FALL SAFETY,     Subjective: Pt reports that he was on a walk with his wife, \"we went further than usual, and she went inside but I kept walking and took a tumble.\" He reports he got too tired and was pushing himself too much and fell into the grass onto his L side, denies hitting his head. He reports that he laid in the grass until his wife came to look for him and she needed to help him stand.    Objective: See treatment below.    TA/NMR:   - completed perfection gameboard matching geometric shapes into the board, working on visual perceptual skills, FMC/dexterity, with amplified reaching to opposite corners of the table to obtain pieces with 1.5lb wrist weights donned working on crossing midline    -  multimatrix stacking letters and numbers with a handwriting component based on the letter, working on alternating attention, sustained attention, sequencing, handwriting legibility. Pt demonstrating difficulty with recall of instructions, frequently rotating cubes and requiring mod cues to complete this activity accurately    Assessment: Tolerated treatment well. Therapist informed PT Marv about Pts recent fall and wife's request to work on standing from the floor during next PT session. Pt demonstrated difficulty with alternating attention and recall of multistep directions today. Pt demonstrating slow processing speed, bradykinesia, decreased insight into cognitive deficits.  Pt would benefit from continued OT services to address functional cognition, " endurance, LE strength, FMC and dexterity.      Plan: Continued skilled OT per POC.      NEW GOALS 12/13/2024  STGs in 4-6 weeks   Pt will increase LE strength to complete 30 second STS with 9 reps for IADLs  Pt will increase dual tasking to complete TUG cog by 3-4 math equations in 10 seconds for IADLs  Pt will increase PPT to 15 points overall for IADLs    LTGs in 8-12 weeks  Pt will increase LE strength to complete 30 second STS with 10 reps for IADLs  Pt will increase dual tasking to complete TUG cog by 4 math equations in 9.7 seconds for IADLs  Pt will increase PPT to 17 points overall for IADLs    Pt will increase dynamic standing balance to 22/28 on minibest  for IADLs in 4 weeks  Pt will increase dynamic standing balance to 24/28 on minibest  for IADLs in 8-12 weeks

## 2025-03-05 ENCOUNTER — OFFICE VISIT (OUTPATIENT)
Facility: CLINIC | Age: 75
End: 2025-03-05
Payer: MEDICARE

## 2025-03-05 DIAGNOSIS — G20.A1 PARKINSON'S DISEASE, UNSPECIFIED WHETHER DYSKINESIA PRESENT, UNSPECIFIED WHETHER MANIFESTATIONS FLUCTUATE (HCC): Primary | ICD-10-CM

## 2025-03-05 DIAGNOSIS — G20.A1 PARKINSON'S DISEASE WITHOUT FLUCTUATING MANIFESTATIONS, UNSPECIFIED WHETHER DYSKINESIA PRESENT (HCC): Primary | ICD-10-CM

## 2025-03-05 PROCEDURE — 97530 THERAPEUTIC ACTIVITIES: CPT

## 2025-03-05 PROCEDURE — 97112 NEUROMUSCULAR REEDUCATION: CPT

## 2025-03-05 PROCEDURE — 97530 THERAPEUTIC ACTIVITIES: CPT | Performed by: PHYSICAL THERAPIST

## 2025-03-05 NOTE — PROGRESS NOTES
Daily Note     Today's date: 3/5/2025  Patient name: Femi Acosta  : 1950  MRN: [id number]  Referring provider: Apolinar Carr MD  Dx:   Encounter Diagnosis   Name Primary?    Parkinson's disease without fluctuating manifestations, unspecified whether dyskinesia present (HCC) Yes             Start Time: 0845  Stop Time: 0930  Total time in clinic (min): 45 minutes    PLAN OF CARE START:24  PLAN OF CARE END: 3/13/25  FREQUENCY: 2x/week  Precautions: FALL SAFETY,     Subjective: Pt asks if he has appointments today while seated in the waiting room    Objective: See treatment below.    TA/NMR:   - crossword puzzle completed while making it in bananagrams on whiteboard working on alternating attention, FMC, visuospatial skills, spatial relations, working memory. Pt frequently would freeze possibly due to slow processing speed mid activity and be unable to recall what he was looking for, requiring mod cues to re-orient to activity    Assessment: Tolerated treatment well. Due to slow processing speed or freezing, Pt frequently would require mod cues to recall what he was doing, difficulty with working memory. Demonstrated better visuospatial skills while creating the puzzle on the board. Pt demonstrating slow processing speed, bradykinesia, decreased insight into cognitive deficits.  Pt would benefit from continued OT services to address functional cognition, endurance, LE strength, FMC and dexterity.      Plan: Continued skilled OT per POC.

## 2025-03-05 NOTE — PROGRESS NOTES
PT Progress Note    Today's date: 3/5/2025  Patient name: Femi Acosta  : 1950  MRN: [id number]  Referring provider: Apolinar Carr MD  Dx:   Encounter Diagnosis     ICD-10-CM    1. Parkinson's disease, unspecified whether dyskinesia present, unspecified whether manifestations fluctuate ([facility])  G20.A1                   Assessment  Assessment details: Patient is a 74 y.o. Male who has been attending skilled outpatient PT with complaints of gait instability and balance deficits resulting in falls. Over the last month patient reports that he has had at least 1 fall while he was outside walking. Since his initial evaluation patient displays improvements with all of his objective measures including 5xSTS, TUG/COG/Carry, and 6 MWT. Improvements with these objective measures suggest improvements with his functional strength, functional endurance, and remains low risk of falls per TUG scores. Patient maintains a similar scores for his 10 MWT, with minor regression observed not enough to reflect MDC. Patient requires 29.56 seconds to rise from the floor to standing position, but is able to complete independently. Due to recent falls and neurodegenerative diagnosis, plan to continue with skilled PT services. Patient is interested in the following program to address noted deficits: [employer]. Per research provided by [employer], patient will benefit from skilled outpatient PT services to improve and maximize his/her function, to reduce risk for falls and potential injuries associated with falls, reduce healthcare costs via hospitalization, and reduce patient and national healthcare costs. In early stages of Parkinson's Disease, research indicates that intensive physical exercise can improve patient's motor control and assist in slowing the disease progression as a neuroprotective agent. Patient will benefit from skilled outpatient PT in order to maximize function in the short-term and long-term with overall improved  postural strength with associated stability and mobility.      Impairments: Abnormal gait, Activity intolerance, Impaired balance, Impaired physical strength, Lacks appropriate HEP, Poor posture, Poor body mechanics, Safety issue  Understanding of Dx/Px/POC: Good  Prognosis: Good      Patient verbalized understanding of POC.    Please contact me if you have any questions or recommendations. Thank you for the referral and the opportunity to share in Femi Acosta's care.           Plan  Plan details: miniBEST  Program: [employer] [facility]  Planned modality interventions: Biofeedback, Cryotherapy, TENS, Thermotherapy  Planned therapy interventions: Abdominal trunk stabilization, ADL training, Balance, Balance/WB training, Breathing training, Body mechanics training, Coordination, Functional ROM exercises, Gait training, HEP, Joint Mobilization, Manual Therapy, Saleem taping, Motor coordination training, Neuromuscular re-education, Patient education, Postural training, Strengthening, Stretching, Therapeutic activities, Therapeutic exercises, Therapeutic training, Transfer training, Activity modification, Work reintegration  Frequency: 2x/wk  Duration in weeks: 12  Plan of Care beginning date: 2/5/2025  Plan of Care expiration date: 12 weeks - 4/30/2025  Treatment plan discussed with: Patient         Goals  Short Term Goals (4 weeks):  - Patient will improve TUG score by MDC of 4.8 sec in order to reduce risk of falls and to increase safety with functional mobility  - Patient will improve 5 STS by the MDC of 2.3 sec indicating an improvement in strength and decreased challenge with transfers  - Patient will improve 6 MWT by the MDC of 269 ft indicating an improvement in cardiovascular endurance in order to maximize function with functional mobility throughout the community  - Patient will improve MiniBESTest score by MDC of 5.52 points indicating an improvement with dynamic balance in order to further decrease risk  of falls with functional tasks  - Patient will be independent in basic HEP in order to manage condition at home    Long Term Goals (12 weeks):  - Patient will score a low risk for falls 2/4 fall risks measures  - Patient will score age norm values for 1/2 endurance measures  - Patient will be able to ambulate on uneven surfaces with 50% reduction in near falls to increase safety with community mobility  - Patient will be able to perform a floor transfer without physical assistance to assist with fall recovery at home and in the community  - Patient will be independent in comprehensive HEP post discharge from program  - Patient will be able to walk up incline with decreased difficulty and no loss of balance in order to improve functional mobility throughout the community  - Patient will be able to perform sit to stands from softer surfaces such as a couch or bed in order to improvement function with transfers  - Patient will be consistent with program for at least 1 day per week to assist with management of condition and functional independence of their condition        Cut off score   All date taken from [facility] Neuro Section or Rehab Measures      ALONSO Cutoff Scores:  MDC: 5 pts  Falls Risk Cutoff: 40.22/56 DGI Cutoff Scores:  Luke et al 2011, MDC: 2.9 pts  Harika et al 2008, Artie: Score <19/24   MiniBEST Cutoff Scores:  Ambrosio et al 2011, MDC: 5.52 pts  Zaki and Randell 2013, Artie: <19/28 5xSTS Cutoff Scores:  MDC: 2.3 sec  Flaco et al, 2011, Artie: > 16 sec   TUG Cutoff Scores:  Demetrius Rivera et al, 2011, MDC: 4.8 sec  Julio Cesar et al, 2011, Fallers: Meds ON: < 12.21 sec, OFF: 15.5 sec 10 Meter Walk Test Cutoff Scores:  Jevon, 2008, MDC: 0.18 m/s  Age Norm Values, Artie: < 1.0 m/s   ABC Cutoff Scores:  Allyson, 2008, MDC: 13 pts  Demetrius Quinn, MDC: 11.12 pts  Increased risk for falls: < 69% 6 Minute Walk Test Cutoff Scores:  Jevon, 2008, MDC: 269 ft  Oleg et al, 2002,  Norm Data Healthy Adults:  60 - 69 years:   M: 572 m      F: 538 m  70 - 79 years:   M: 527 m      F: 471 m  80 - 89 years:   M: 417 m      F: 392 m   PDQ-39 Cutoff Scores:  Abhishek et al, 2004:  MDC: Mobility (12.24), ADL (16.72), Emotional (14.22), Stigma (21.21), Social Support (21.25), Cognition (21.12), Communication (21.04), Bodily Discomfort (24.48)  Sarthak et al, 2007:  Normative Data: Mobility (49.25), ADL (38.94), Emotional (37.92), Stigma (27.54), Social Support (14.78), Cognition (33.03), Communication (27.99), Bodily Discomfort (40.91) Deangelo and Yahr Stages  Stage 0: No S/S  Stage 1: Mild unilateral symptoms  Stage 1.5: Unilateral and axial symptoms  Stage 2: Bilateral symptoms, no balance impairment  Stage 2.5: Mild bilateral symptoms and recovery with pull test  Stage 3: Mild to moderate postural instability, independent  Stage 4: Severe disability, walking or standing unassisted  Stage 5: Bed bound, w/c bound           Subjective Evaluation    History of Present Illness  Mechanism of injury: Patient arrives with wife for evaluation. He was diagnosed with PD approximately 3-4 years ago, they are interested in neuro boxing program. He was recently seen by angelo for PT, was seeing OT in [place] but would like to change all services to [place] location to be closer to home. Patient has had 1 fall in the last month. Difficulties with dual tasking    Update 3/5/25: Patient reports that he was having some dizziness this morning. Also reports he has had at least 1 fall over the last month. Would like to continue with the boxing program.   Primary AD: none    Patient goal: reduce risk of falls, improve seated/standing from chairs    Pain  Current pain ratin/10    Social Support  Steps to enter house: 3-4 steps  Stairs in house: 1 flight   Lives in: wife  Lives with: house    Employment status: Retired    Treatments  Previous treatment: PT, OT  Current treatment: OT      Objective   HR: 69  bpm  SpO2: 96%  BP: 105/64 mmHg    Gait abnormalities: reduced UE swing, anterior trunk lean        Outcome Measures Initial Eval  2/5/2025 PN  3/5/25       5xSTS 15.3 sec 13.85 sec       TUG  - Regular  - Cognitive  - Carry   9.1 sec  9.6 sec  9.4 sec   7.69 sec  8.72 sec  8.59 sec       MiniBEST /28        10 meter 1.28 m/s 1.23 m/s       6MWT 1535 ft 1655.8 ft       ABC 68.75% 69.38%       PDQ-39 /100        MDS-UPDRS  Part III   /128        H&Y Stage         Floor > Stand  sec 29.56 sec                     Precautions:   Past Medical History:   Diagnosis Date    Allergic rhinitis     Constipation     GERD (gastroesophageal reflux disease)     Hyperlipidemia     Overactive bladder     Parkinson disease (HCC)     Prostate enlargement

## 2025-03-10 ENCOUNTER — EVALUATION (OUTPATIENT)
Facility: CLINIC | Age: 75
End: 2025-03-10
Payer: MEDICARE

## 2025-03-10 ENCOUNTER — OFFICE VISIT (OUTPATIENT)
Facility: CLINIC | Age: 75
End: 2025-03-10
Payer: MEDICARE

## 2025-03-10 DIAGNOSIS — G20.A1 PARKINSON'S DISEASE, UNSPECIFIED WHETHER DYSKINESIA PRESENT, UNSPECIFIED WHETHER MANIFESTATIONS FLUCTUATE (HCC): Primary | ICD-10-CM

## 2025-03-10 DIAGNOSIS — G20.A1 PARKINSON'S DISEASE WITHOUT FLUCTUATING MANIFESTATIONS, UNSPECIFIED WHETHER DYSKINESIA PRESENT (HCC): Primary | ICD-10-CM

## 2025-03-10 PROCEDURE — 97530 THERAPEUTIC ACTIVITIES: CPT

## 2025-03-10 PROCEDURE — 97112 NEUROMUSCULAR REEDUCATION: CPT

## 2025-03-10 NOTE — PROGRESS NOTES
Daily Note     Today's date: 3/10/2025  Patient name: Femi Acosta  : 1950  MRN: [id number]  Referring provider: Apolinar Carr MD  Dx:   Encounter Diagnosis     ICD-10-CM    1. Parkinson's disease, unspecified whether dyskinesia present, unspecified whether manifestations fluctuate ([facility])  G20.A1           Start Time: 0930  Stop Time: 1015  Total time in clinic (min): 45 minutes    Subjective: Patient arrives noting lower back pain for about the past week. When asked about stretching, reports he feels that will aggravate it. Requested he let therapist know if anything we do increases his back pain today.       Objective: See treatment diary below      Assessment: Tolerated treatment  fairly well, with difficulty remembering numbers for punches, requiring cues and increased time to complete some activities, especially when combos are out of sequence. Patient motivated for activities and engaged well throughout session . Patient demonstrated fatigue post treatment, exhibited good technique with therapeutic exercises, and would benefit from continued PT      Plan: Continue per plan of care.  Progress treatment as tolerated.           Outcome Measures Initial Eval  2025 PN  3/5/25       5xSTS 15.3 sec 13.85 sec       TUG  - Regular  - Cognitive  - Carry   9.1 sec  9.6 sec  9.4 sec   7.69 sec  8.72 sec  8.59 sec       MiniBEST /28        10 meter 1.28 m/s 1.23 m/s       6MWT 1535 ft 1655.8 ft       ABC 68.75% 69.38%       PDQ-39 /100        MDS-UPDRS  Part III   /128        H&Y Stage         Floor > Stand  sec 29.56 sec                       Date 2/12/25 2/19 2/26 3/5/25 3/10/25    Visit # 2 3 4 5 (Progress note) 6        See days note     NMR         Neuro Boxing Warm up - Marching  - Toe walking  - Heel walking  - Lateral jacks - Marching  - Toe walking  - Heel walking  - Lateral jacks - Marching  - Toe walking  - Heel walking  - Lateral jacks  Marching w/ opp UE punch  Toe walking w/ cues for height  Heel  "walking  Lateral jacks  All x 1-2 min ea    Shadow boxing 1, 2, 3, 4, 5, 6,  1, 2, 3, 4, 5, 6 1, 2, 3, 4, 5, 6  20x ea 1,2,3,4,5,6    Circuit 1 - STS > combos  - FWD hurdles > combos  - LAT hurdles > combos  (2 sets, 2 min ea) Agility ladder  - Double leg in/out FWD > combos  - Double leg in LAT > combos  - Single leg FWD > combos    (2 sets, 2-3 min ea) - Sidestepping on foam > combos  - Tandem stepping on foam > combos  - Marching on foam > combos    (2 sets, 2-3 min ea)  Walking w/ alt 1-2, 3-4 & 5-6 combos  STS w/ combos  Hurdles fwd w/ 1 punch each step 4 laps  Hurdles lateral w/ combos      Circuit 2   - Standing trunk twist  - Standing \"open books\"    20x ea, 3 sec holds      TA         Patient education - Testing and objective measures  - Role of PT  - Neuro Boxing program        Bed mobility  Seated > supine  Supine > seated  B/L rolling                       "

## 2025-03-10 NOTE — PROGRESS NOTES
OCCUPATIONAL THERAPY RE-EVALUATION    Today's Date: 3/10/2025  Patient Name: Femi Acosta  : 1950  MRN: [id number]  Referring Provider: Apolinar Carr MD  Dx: Parkinson's disease without fluctuating manifestations, unspecified whether dyskinesia present (HCC) [G20.A1]    Active Problem List: There is no problem list on file for this patient.    Past Medical Hx:   Past Medical History:   Diagnosis Date    Allergic rhinitis     Constipation     GERD (gastroesophageal reflux disease)     Hyperlipidemia     Overactive bladder     Parkinson disease (HCC)     Prostate enlargement      Past Surgical Hx:   Past Surgical History:   Procedure Laterality Date    ANKLE FRACTURE SURGERY      CYST REMOVAL            Eval/ Re-eval POC expires FOTO Auth #/ Referral # Total units  Start date  Expiration date Extension                                                             Date               Units:  Used               Authed:  Remaining                   SKILLED ANALYSIS:  Pt presents to OP OT re-evaluation after about 3 months of services in the setting of PD.  Pt is unsure if he has changes in his functional state. Based on assessments, pt is demonstrating 1-point improvement on MoCA today. He shows improvement in TM A time, however TM B declined and he was unable to complete as much of this assessment as he previously was able to which may be due to poor recall of directions, difficulty with alternating attention, and poor problem solving skills. CMT immediate visual memory has remained the same, while delayed memory has declined. FMC through 9 hole peg test has declined and Pt is exhibiting overall slower movements which may impact these results. Pt has met 1 STG and goals have been updated accordingly for new POC. Pt continues to demonstrate significant deficits in the following domains: overall functional cognition, sustained attn, divided attn, EF, , immediate and delayed visual recall, endurance,  "activity tolerance.  Recommend continued participation in OP OT services 2-3x/wk for another 12 weeks to maximize functioning and independence with daily activities.  Discussed results and recommendations with pt and his wife, both are in agreement.      Subjective  3/10: \"I am doing good at home, I'm going on more walks because its getting nicer out now.\"  2/3: \"It shocks me how slow it takes me sometimes to figure these things out during sessions\"      Occupational Profile:   Pt is a 74 year old retired . He enjoys to work outside and garden. Currently the pt having difficulty with writing, putting on clothing, working in his garden, performing lawn work and computer/cell phone usage. Over the past year or two the pt also states he has had more difficulty maintaining a conversation, as he loses his train of thought easily.    PATIENT GOAL: Improve writing, endurance, cognition, engage in RSB program.     HISTORY OF PRESENT ILLNESS:     Pt is a 74 y.o. male who was referred to Occupational Therapy s/p  Parkinson's disease without fluctuating manifestations, unspecified whether dyskinesia present (HCC) [G20.A1].     PMH:   Past Medical History:   Diagnosis Date    Allergic rhinitis     Constipation     GERD (gastroesophageal reflux disease)     Hyperlipidemia     Overactive bladder     Parkinson disease (HCC)     Prostate enlargement        Past Surgical Hx:   Past Surgical History:   Procedure Laterality Date    ANKLE FRACTURE SURGERY  1975    CYST REMOVAL  1972     PLAN OF CARE START: 3/10/2025  PLAN OF CARE END: 6/10/2025  FREQUENCY: 2-3x/week  Precautions: FALL SAFETY,     OBJECTIVE     9 HOLE PEG TEST: performed 9 hole peg test to assess dexterity/fine motor coordination with pt scoring 1 min 51 sec (prior 108 second) on R hand  and 1 min 23 sec (prior 90 seconds) on L hand  side. Pt demonstrating decreased FMC related to age-related norms   DECLINE    Trail Making Test A: 3 min 13 seconds IND (prior: " "3 min 53 sec with 4 cues for problem solving) IMPROVED  Trail Making Test B: 4 min 27 sec to complete through 4 with 6 cues (prior: 4 min to complete trhough E with 5 cues) DECLINED  Norms: A: 40.13 seconds, B: 86.27 seconds.      Scores imply severe deficits with sustained and divided attn, EF, working memory.    Contextual Memory Test:   Immediate:  (prior: ) MAINTAINED   Delayed: , 1 confabulation (prior: ) DECLINED    Matthew Cognitive Assessment Version 8.1 (MoCA V8.1)  Visuospatial/executive functioning:  3/5  Namin/3  Memory: 1st trial:  , 2nd trial:    Attention/concentration:   List of letters:   Seial Seven Subtraction:  3/3   Language/sentence repetition:    Language Fluency:  0/1 (n=8)  Abstract/Correlational Thinkin/2  Delayed Recall:    Orientation:                Memory Index Score: 11/15  MoCA V1 8.3 Raw Score:  , MIS:  11/15, indicative of MILD neurocognitive impairments. IMPROVED    MoCA Scoring        Normal: 26+         Mild Cognitive Impairment: 18-25          Moderate Cognitive Impairment: 10-17         Severe Cognitive Impairment: <10         NT 3/10                                     Physical Performance Test             Time       Score   Write sentence 18   2   Simulated  eating 21.21 1    Lift book 3.7 (blue binder) 3     Jacket   32 1                                            Violetta 4.5 2                                        Turn 360 2 0                                    50 foot walk  16.2 3      9 point scale:   - 32-36= no impairment  - 25-32= mild   - 17-24 moderate  - < 17 = unable to function in community.  7 point scale:  - < 19.4 mild  - 32-36 not frail.        UE Strength:              MALCOM: RUE\" 72lb (previously 92 lbs) LUE: 74lb (previously 95 lbs)  The age norm is approximately 55-65 lbs and indicating normal  strength  Pt is R hand dominant  NT 3/10     Range of Motion:  AROM:   BUE within normal limits     MMT: NT " 2/3  R UE: 4+/5 in all pivots      L UE 4+/5 in all pivots      Pt is L hand dominant     GOALS:   Short Term Goals: 4-6 weeks   Pt will increase delayed recall and recall 2/5 items on MOCA to complete IADLs Goal met 3/10  Pt will demonstrate improved functional cognition as evidenced by improving score on the MoCA to 19/30 to improve performance during ADLs and IADLs Goal met 2/3  Pt will increase FMC by 10 seconds bilaterally on 9 hole peg to complete ADLs and IADLs No change  Pt will demonstrate independence with UE strengthening HEP as per patient report Progressing  Pt will increase LE strength to complete 30 second STS with 9 reps for IADLs NT  Pt will increase dual tasking to complete TUG cog by 3-4 math equations in 10 seconds for IADLs NT  Pt will increase PPT to 15 points overall for IADLs NT    Updated STGs 3/10  Pt will increase delayed recall and recall 3/5 items on MOCA to complete IADLs   Pt will demonstrate improved functional cognition as evidenced by improving score on the MoCA to 21/30 to improve performance during ADLs and IADLs  Pt will increase FMC to 1min 40sec with R and 1min 10sec on the L on 9 hole peg to complete ADLs and IADLs  Pt will decrease time on TM A to 3 mins as evidence of improved working memory, problem solving, and alternating attention for functional cognition  Pt will increase PPT to 13 points overall for IADLs    Long Term Goals: 8-12 weeks   Pt will improve functional endurance to engage in daily tasks or yard works without complaints of fatigue. No change  Pt will will improve social participation and engage in the RSB program on a weekly basis to assist with functional mobility and endurance. Goal met 2/3, discontinued as Pt changed care to [place] and will receive individual RSB intervention during PT  Pt will improve performance with fasteners for clothing management as per patient report. Progressing  Pt will improve UE strength to 5/5 in all planes to improve  performance with ADLs/IADLs NT  Pt will increase LE strength to complete 30 second STS with 10 reps for IADLs NT  Pt will increase dual tasking to complete TUG cog by 4 math equations in 9.7 seconds for IADLs NT  Pt will increase PPT to 17 points overall for IADLs NT    Updated LTGs 3/10  Pt will increase delayed recall and recall 4/5 items on MOCA to complete IADLs   Pt will demonstrate improved functional cognition as evidenced by improving score on the MoCA to 22/30 to improve performance during ADLs and IADLs  Pt will increase FMC to 1min 35sec with R and 1min 5sec on the L on 9 hole peg to complete ADLs and IADLs  Pt will decrease time on TM A to 2 min 45 sec as evidence of improved working memory, problem solving, and alternating attention for functional cognition  Pt will increase PPT to 14 points overall for IADLs    OTHER PLANNED THERAPY INTERVENTIONS:   Supine, seated, and in stance neuro re-ed  FMC/prehension  Timed Trials  Hand to target  Seated functional reach: crossing midline  Closed chain activities  Open chain activities  Internal and external memory aides  Multimatrix for saccades/ visual clutter/attention  Hypersensitivity strategies education  Multi-modal environment  Sustained/alternating/divided attention  Tracking tube  Oculomotor control:  saccades, con/divergence  Conv./div. Dynamic tasks  Work stations with timed transitions  Temporal Awareness  Memory and mental manipulation  Auditory processing with immediate recall  Memory retention with immediate and delayed recall  Edu on cog/vision apps

## 2025-03-10 NOTE — LETTER
March 10, 2025    Apolinar Carr MD  [address]  [address]  [facility] [address]    Patient: Femi Acosta   YOB: 1950   Date of Visit: 3/10/2025     Encounter Diagnosis     ICD-10-CM    1. Parkinson's disease without fluctuating manifestations, unspecified whether dyskinesia present (HCC)  G20.A1           Dear Dr. Carr:    Thank you for your recent referral of Femi Acosta. Please review the attached evaluation summary from Femi's recent visit.     Please verify that you agree with the plan of care by signing the attached order.     If you have any questions or concerns, please do not hesitate to call.     I sincerely appreciate the opportunity to share in the care of one of your patients and hope to have another opportunity to work with you in the near future.     Sincerely,    DESIREE Coleman, OTR/L  Physical Therapy at [facility] Occupational Therapist  [place] OT License# [id number]         Referring Provider:     I certify that I have read the below Plan of Care and certify the need for these services furnished under this plan of treatment while under my care.                    Apolinar Carr MD  [address]  [address]  [facility] [address]  Via Fax: [phone]        OCCUPATIONAL THERAPY RE-EVALUATION    Today's Date: 3/10/2025  Patient Name: Femi Acosta  : 1950  MRN: [id number]  Referring Provider: Apolinar Carr MD  Dx: Parkinson's disease without fluctuating manifestations, unspecified whether dyskinesia present (HCC) [G20.A1]    Active Problem List: There is no problem list on file for this patient.    Past Medical Hx:   Past Medical History:   Diagnosis Date   • Allergic rhinitis    • Constipation    • GERD (gastroesophageal reflux disease)    • Hyperlipidemia    • Overactive bladder    • Parkinson disease (HCC)    • Prostate enlargement      Past Surgical Hx:   Past Surgical History:   Procedure Laterality Date   • ANKLE FRACTURE SURGERY     •  "CYST REMOVAL  1972          Eval/ Re-eval POC expires FOTO Auth #/ Referral # Total units  Start date  Expiration date Extension                                                             Date               Units:  Used               Authed:  Remaining                   SKILLED ANALYSIS:  Pt presents to OP OT re-evaluation after about 3 months of services in the setting of PD.  Pt is unsure if he has changes in his functional state. Based on assessments, pt is demonstrating 1-point improvement on MoCA today. He shows improvement in TM A time, however TM B declined and he was unable to complete as much of this assessment as he previously was able to which may be due to poor recall of directions, difficulty with alternating attention, and poor problem solving skills. CMT immediate visual memory has remained the same, while delayed memory has declined. FMC through 9 hole peg test has declined and Pt is exhibiting overall slower movements which may impact these results. Pt has met 1 STG and goals have been updated accordingly for new POC. Pt continues to demonstrate significant deficits in the following domains: overall functional cognition, sustained attn, divided attn, EF, , immediate and delayed visual recall, endurance, activity tolerance.  Recommend continued participation in OP OT services 2-3x/wk for another 12 weeks to maximize functioning and independence with daily activities.  Discussed results and recommendations with pt and his wife, both are in agreement.      Subjective  3/10: \"I am doing good at home, I'm going on more walks because its getting nicer out now.\"  2/3: \"It shocks me how slow it takes me sometimes to figure these things out during sessions\"      Occupational Profile:   Pt is a 74 year old retired . He enjoys to work outside and garden. Currently the pt having difficulty with writing, putting on clothing, working in his garden, performing lawn work and computer/cell phone usage. " Over the past year or two the pt also states he has had more difficulty maintaining a conversation, as he loses his train of thought easily.    PATIENT GOAL: Improve writing, endurance, cognition, engage in RSB program.     HISTORY OF PRESENT ILLNESS:     Pt is a 74 y.o. male who was referred to Occupational Therapy s/p  Parkinson's disease without fluctuating manifestations, unspecified whether dyskinesia present ([facility]) [G20.A1].     PMH:   Past Medical History:   Diagnosis Date   • Allergic rhinitis    • Constipation    • GERD (gastroesophageal reflux disease)    • Hyperlipidemia    • Overactive bladder    • Parkinson disease (HCC)    • Prostate enlargement        Past Surgical Hx:   Past Surgical History:   Procedure Laterality Date   • ANKLE FRACTURE SURGERY     • CYST REMOVAL       PLAN OF CARE START: 3/10/2025  PLAN OF CARE END: 6/10/2025  FREQUENCY: 2-3x/week  Precautions: FALL SAFETY,     OBJECTIVE     9 HOLE PEG TEST: performed 9 hole peg test to assess dexterity/fine motor coordination with pt scoring 1 min 51 sec (prior 108 second) on R hand  and 1 min 23 sec (prior 90 seconds) on L hand  side. Pt demonstrating decreased FMC related to age-related norms   DECLINE    Trail Making Test A: 3 min 13 seconds IND (prior: 3 min 53 sec with 4 cues for problem solving) IMPROVED  Trail Making Test B: 4 min 27 sec to complete through 4 with 6 cues (prior: 4 min to complete trhough E with 5 cues) DECLINED  Norms: A: 40.13 seconds, B: 86.27 seconds.      Scores imply severe deficits with sustained and divided attn, EF, working memory.    Contextual Memory Test:   Immediate:  (prior: ) MAINTAINED   Delayed: , 1 confabulation (prior: ) DECLINED    [place] Cognitive Assessment Version 8.1 (MoCA V8.1)  Visuospatial/executive functioning:  3/5  Namin/3  Memory: 1st trial:  , 2nd trial:    Attention/concentration:   List of letters:   Seial Seven Subtraction:  3/3   Language/sentence  "repetition:  1/2  Language Fluency:  0/1 (n=8)  Abstract/Correlational Thinkin/2  Delayed Recall:  2/5  Orientation:  6              Memory Index Score: 11/15  MoCA V1 8.3 Raw Score:  20/30, MIS:  11/15, indicative of MILD neurocognitive impairments. IMPROVED    MoCA Scoring        Normal: 26+         Mild Cognitive Impairment: 18-25          Moderate Cognitive Impairment: 10-17         Severe Cognitive Impairment: <10         NT 3/10                                     Physical Performance Test             Time       Score   Write sentence 18   2   Simulated  eating 21.21 1    Lift book 3.7 (blue binder) 3     Jacket   32 1                                            Violetta 4.5 2                                        Turn 360 2 0                                    50 foot walk  16.2 3      9 point scale:   - 32-36= no impairment  - 25-32= mild   - 17-24 moderate  - < 17 = unable to function in community.  7 point scale:  - < 19.4 mild  - 32-36 not frail.        UE Strength:              MALCOM: RUE\" 72lb (previously 92 lbs) LUE: 74lb (previously 95 lbs)  The age norm is approximately 55-65 lbs and indicating normal  strength  Pt is R hand dominant  NT 3/10     Range of Motion:  AROM:   BUE within normal limits     MMT: NT 2/3  R UE: 4+/5 in all pivots      L UE 4+/5 in all pivots      Pt is L hand dominant     GOALS:   Short Term Goals: 4-6 weeks   Pt will increase delayed recall and recall 2/5 items on MOCA to complete IADLs Goal met 3/10  Pt will demonstrate improved functional cognition as evidenced by improving score on the MoCA to 19/30 to improve performance during ADLs and IADLs Goal met 2/3  Pt will increase FMC by 10 seconds bilaterally on 9 hole peg to complete ADLs and IADLs No change  Pt will demonstrate independence with UE strengthening HEP as per patient report Progressing  Pt will increase LE strength to complete 30 second STS with 9 reps for IADLs NT  Pt will increase dual tasking to " complete TUG cog by 3-4 math equations in 10 seconds for IADLs NT  Pt will increase PPT to 15 points overall for IADLs NT    Updated STGs 3/10  Pt will increase delayed recall and recall 3/5 items on MOCA to complete IADLs   Pt will demonstrate improved functional cognition as evidenced by improving score on the MoCA to 21/30 to improve performance during ADLs and IADLs  Pt will increase FMC to 1min 40sec with R and 1min 10sec on the L on 9 hole peg to complete ADLs and IADLs  Pt will decrease time on TM A to 3 mins as evidence of improved working memory, problem solving, and alternating attention for functional cognition  Pt will increase PPT to 13 points overall for IADLs    Long Term Goals: 8-12 weeks   Pt will improve functional endurance to engage in daily tasks or yard works without complaints of fatigue. No change  Pt will will improve social participation and engage in the RSB program on a weekly basis to assist with functional mobility and endurance. Goal met 2/3, discontinued as Pt changed care to [place] and will receive individual RSB intervention during PT  Pt will improve performance with fasteners for clothing management as per patient report. Progressing  Pt will improve UE strength to 5/5 in all planes to improve performance with ADLs/IADLs NT  Pt will increase LE strength to complete 30 second STS with 10 reps for IADLs NT  Pt will increase dual tasking to complete TUG cog by 4 math equations in 9.7 seconds for IADLs NT  Pt will increase PPT to 17 points overall for IADLs NT    Updated LTGs 3/10  Pt will increase delayed recall and recall 4/5 items on MOCA to complete IADLs   Pt will demonstrate improved functional cognition as evidenced by improving score on the MoCA to 22/30 to improve performance during ADLs and IADLs  Pt will increase FMC to 1min 35sec with R and 1min 5sec on the L on 9 hole peg to complete ADLs and IADLs  Pt will decrease time on TM A to 2 min 45 sec as evidence of improved  working memory, problem solving, and alternating attention for functional cognition  Pt will increase PPT to 14 points overall for IADLs    OTHER PLANNED THERAPY INTERVENTIONS:   Supine, seated, and in stance neuro re-ed  FMC/prehension  Timed Trials  Hand to target  Seated functional reach: crossing midline  Closed chain activities  Open chain activities  Internal and external memory aides  Multimatrix for saccades/ visual clutter/attention  Hypersensitivity strategies education  Multi-modal environment  Sustained/alternating/divided attention  Tracking tube  Oculomotor control:  saccades, con/divergence  Conv./div. Dynamic tasks  Work stations with timed transitions  Temporal Awareness  Memory and mental manipulation  Auditory processing with immediate recall  Memory retention with immediate and delayed recall  Edu on cog/vision apps

## 2025-03-12 ENCOUNTER — OFFICE VISIT (OUTPATIENT)
Facility: CLINIC | Age: 75
End: 2025-03-12
Payer: MEDICARE

## 2025-03-12 DIAGNOSIS — G20.A1 PARKINSON'S DISEASE WITHOUT FLUCTUATING MANIFESTATIONS, UNSPECIFIED WHETHER DYSKINESIA PRESENT (HCC): Primary | ICD-10-CM

## 2025-03-12 DIAGNOSIS — G20.A1 PARKINSON'S DISEASE, UNSPECIFIED WHETHER DYSKINESIA PRESENT, UNSPECIFIED WHETHER MANIFESTATIONS FLUCTUATE (HCC): Primary | ICD-10-CM

## 2025-03-12 PROCEDURE — 97530 THERAPEUTIC ACTIVITIES: CPT | Performed by: PHYSICAL THERAPIST

## 2025-03-12 PROCEDURE — 97530 THERAPEUTIC ACTIVITIES: CPT

## 2025-03-12 PROCEDURE — 97112 NEUROMUSCULAR REEDUCATION: CPT

## 2025-03-12 PROCEDURE — 97112 NEUROMUSCULAR REEDUCATION: CPT | Performed by: PHYSICAL THERAPIST

## 2025-03-12 NOTE — PROGRESS NOTES
"Daily Note     Today's date: 3/12/2025  Patient name: Femi Acosta  : 1950  MRN: [id number]  Referring provider: Apolinar Carr MD  Dx:   Encounter Diagnosis   Name Primary?    Parkinson's disease without fluctuating manifestations, unspecified whether dyskinesia present (HCC) Yes               Start Time: 930  Stop Time: 1015  Total time in clinic (min): 45 minutes    PLAN OF CARE START: 3/10/2025  PLAN OF CARE END: 6/10/2025  FREQUENCY: 2-3x/week  Precautions: FALL SAFETY,     Subjective: Pt reports \"this was difficult\"    Objective: See treatment below.    NMR:   - honeybee tree activity completed with handwriting component working on divided/alternating attention, FMC/dexterity, handwriting legibility    TA:  - memory mix up: placing 6 in alphabetical order, then memorizing x3 at a time and identifying the missing photo, 50% accuracy with 2 trials working on mental manipulation, immediate visual memory.        Assessment: Tolerated treatment well. Slow processing speed and low attention impacts Pts working memory during tasks today. He has dfficulty with immediate visual memory and required multiple cues to attend to the task. Pt demonstrating slow processing speed, bradykinesia, decreased insight into cognitive deficits.  Pt would benefit from continued OT services to address functional cognition, endurance, LE strength, FMC and dexterity.      Plan: Continued skilled OT per POC.      "

## 2025-03-12 NOTE — PROGRESS NOTES
Daily Note     Today's date: 3/12/2025  Patient name: Femi Acosta  : 1950  MRN: [id number]  Referring provider: Apolinar Carr MD  Dx:   Encounter Diagnosis     ICD-10-CM    1. Parkinson's disease, unspecified whether dyskinesia present, unspecified whether manifestations fluctuate ([facility])  G20.A1                      Subjective: Patient arrives from OT, reports no changes over the last week       Objective: See treatment diary below      Assessment: Patient tolerated treatment well. Patient displayed improved punch recall today, however did require visual cueing to increase speed of combinations. Occasional Ue support required when on foam beam to prevent posterior LOB. Standing to floor and floor to standing transfers completed today with patient requiring only supervision to complete safely. Patient demonstrated fatigue post treatment, exhibited good technique with therapeutic exercises, and would benefit from continued PT      Plan: Continue per plan of care.  Progress treatment as tolerated.           Outcome Measures Initial Eval  2025 PN  3/5/25       5xSTS 15.3 sec 13.85 sec       TUG  - Regular  - Cognitive  - Carry   9.1 sec  9.6 sec  9.4 sec   7.69 sec  8.72 sec  8.59 sec       MiniBEST /28        10 meter 1.28 m/s 1.23 m/s       6MWT 1535 ft 1655.8 ft       ABC 68.75% 69.38%       PDQ-39 /100        MDS-UPDRS  Part III   /128        H&Y Stage         Floor > Stand  sec 29.56 sec                       Date 2/12/25 2/19 2/26 3/5/25 3/10/25 3/12/25   Visit # 2 3 4 5 (Progress note) 6 7       See days note     NMR         Neuro Boxing Warm up - Marching  - Toe walking  - Heel walking  - Lateral jacks - Marching  - Toe walking  - Heel walking  - Lateral jacks - Marching  - Toe walking  - Heel walking  - Lateral jacks  Marching w/ opp UE punch  Toe walking w/ cues for height  Heel walking  Lateral jacks  All x 1-2 min ea - Standing marching  - FWD step and reach  - LAT step and reach     Shadow  "boxing 1, 2, 3, 4, 5, 6,  1, 2, 3, 4, 5, 6 1, 2, 3, 4, 5, 6  20x ea 1,2,3,4,5,6 20x ea  1, 2, 3, 4, 5, 6   Circuit 1 - STS > combos  - FWD hurdles > combos  - LAT hurdles > combos  (2 sets, 2 min ea) Agility ladder  - Double leg in/out FWD > combos  - Double leg in LAT > combos  - Single leg FWD > combos    (2 sets, 2-3 min ea) - Sidestepping on foam > combos  - Tandem stepping on foam > combos  - Marching on foam > combos    (2 sets, 2-3 min ea)  Walking w/ alt 1-2, 3-4 & 5-6 combos  STS w/ combos  Hurdles fwd w/ 1 punch each step 4 laps  Hurdles lateral w/ combos   - Sidestepping on foam > combos  - STS > combos  - Step up on foam > combos    (1 set, 2-3 min ea)   Circuit 2   - Standing trunk twist  - Standing \"open books\"    20x ea, 3 sec holds      TA         Patient education - Testing and objective measures  - Role of PT  - Neuro Boxing program        Bed mobility  Seated > supine  Supine > seated  B/L rolling       Transfers      Standing to floor x3  Floor to standing x3                   "

## 2025-03-17 ENCOUNTER — OFFICE VISIT (OUTPATIENT)
Facility: CLINIC | Age: 75
End: 2025-03-17
Payer: MEDICARE

## 2025-03-17 DIAGNOSIS — G20.A1 PARKINSON'S DISEASE WITHOUT FLUCTUATING MANIFESTATIONS, UNSPECIFIED WHETHER DYSKINESIA PRESENT (HCC): Primary | ICD-10-CM

## 2025-03-17 DIAGNOSIS — G20.A1 PARKINSON'S DISEASE, UNSPECIFIED WHETHER DYSKINESIA PRESENT, UNSPECIFIED WHETHER MANIFESTATIONS FLUCTUATE (HCC): Primary | ICD-10-CM

## 2025-03-17 PROCEDURE — 97530 THERAPEUTIC ACTIVITIES: CPT

## 2025-03-17 PROCEDURE — 97112 NEUROMUSCULAR REEDUCATION: CPT

## 2025-03-17 PROCEDURE — 97110 THERAPEUTIC EXERCISES: CPT

## 2025-03-17 NOTE — PROGRESS NOTES
Daily Note     Today's date: 3/17/2025  Patient name: Femi Acosta  : 1950  MRN: [id number]  Referring provider: Apolinar Carr MD  Dx:   Encounter Diagnosis     ICD-10-CM    1. Parkinson's disease, unspecified whether dyskinesia present, unspecified whether manifestations fluctuate ([facility])  G20.A1           Start Time: 1015  Stop Time: 1100  Total time in clinic (min): 45 minutes    Subjective: Patient arrives from OT with no new complaints. Expressed that he was challenged by the activity he was doing in OT, and asked if I knew if anyone could do it successfully.       Objective: See treatment diary below      Assessment: Tolerated treatment  fairly well, with patient demonstrating increased difficulty with sequencing and direction following today. Overall, noted to have gradually improving sequencing of activities since start of care. Extended discussion with wife (with patient present) occurred after end of session, with wife expressing concerns about feeling that she does not know what is happening in his OT and PT sessions as well as how he is progressing. Requesting additional information about long-term plan of care also. Wife expressing concerns about his posture, as well as patient's limited willingness to perform exercises every day, with his doctor reporting to them that exercise is 40% of the puzzle for preventing progression . Will discuss with other team members to ensure consistent communication, with frequent opportunities for wife to observe a small portion of sessions. Patient demonstrated fatigue post treatment, exhibited good technique with therapeutic exercises, and would benefit from continued PT      Plan: Continue per plan of care.  Progress treatment as tolerated.         Outcome Measures Initial Eval  2025 PN  3/5/25       5xSTS 15.3 sec 13.85 sec       TUG  - Regular  - Cognitive  - Carry   9.1 sec  9.6 sec  9.4 sec   7.69 sec  8.72 sec  8.59 sec       MiniBEST /28        10  meter 1.28 m/s 1.23 m/s       6MWT 1535 ft 1655.8 ft       ABC 68.75% 69.38%       PDQ-39 /100        MDS-UPDRS  Part III   /128        H&Y Stage         Floor > Stand  sec 29.56 sec                       Date 3/17/25    3/10/25 3/12/25   Visit # 8    6 7            NMR 40'        Neuro Boxing Warm up -Walking march, cues for height  -Lateral jacks, cues for sequencing      Marching w/ opp UE punch  Toe walking w/ cues for height  Heel walking  Lateral jacks  All x 1-2 min ea - Standing marching  - FWD step and reach  - LAT step and reach     Shadow boxing 1,2,3,4,5,6 20x each, good effort noted    20x ea 1,2,3,4,5,6 20x ea  1, 2, 3, 4, 5, 6   Circuit 1 -STS w/ Combos, grad inc complexity  -Step up on foam w/ combos  -Floor to ceiling stretches 10x  -    Walking w/ alt 1-2, 3-4 & 5-6 combos  STS w/ combos  Hurdles fwd w/ 1 punch each step 4 laps  Hurdles lateral w/ combos   - Sidestepping on foam > combos  - STS > combos  - Step up on foam > combos    (1 set, 2-3 min ea)   Circuit 2         TE 10'        Patient education Reviewed patient's progress w/ wife and patietn after end of session. Concerns expressed regarding communication, and not being able to observe each session        Bed mobility         Transfers      Standing to floor x3  Floor to standing x3

## 2025-03-17 NOTE — PROGRESS NOTES
"Daily Note     Today's date: 3/17/2025  Patient name: Femi Acosta  : 1950  MRN: [id number]  Referring provider: Apolinar Carr MD  Dx:   Encounter Diagnosis   Name Primary?    Parkinson's disease without fluctuating manifestations, unspecified whether dyskinesia present (HCC) Yes                 Start Time: 930  Stop Time: 1015  Total time in clinic (min): 45 minutes    PLAN OF CARE START: 3/10/2025  PLAN OF CARE END: 6/10/2025  FREQUENCY: 2-3x/week  Precautions: FALL SAFETY,     Subjective: Pt reports \"this was the hardest thing I've had to do so far\"    Objective: See treatment below.    TA/NMR:   - multimatrix visual closure card completed with letters and shapes, with a naming component to work on  skills, working memory, alternating attention, FM skills. Required min>mod cues to complete each step with severe difficulty following multistep directions and alternating attention        Assessment: Tolerated treatment fair. Severe difficulty today with following multistep directions, poor working memory and alternating attention noted. He requires multiple cues to attend to task which may be impacted by episodes of freezing and slow processing speed. Pt demonstrating slow processing speed, bradykinesia, decreased insight into cognitive deficits.  Pt would benefit from continued OT services to address functional cognition, endurance, LE strength, FMC and dexterity.      Plan: Continued skilled OT per POC.      "

## 2025-03-19 ENCOUNTER — OFFICE VISIT (OUTPATIENT)
Facility: CLINIC | Age: 75
End: 2025-03-19
Payer: MEDICARE

## 2025-03-19 DIAGNOSIS — G20.A1 PARKINSON'S DISEASE WITHOUT FLUCTUATING MANIFESTATIONS, UNSPECIFIED WHETHER DYSKINESIA PRESENT (HCC): Primary | ICD-10-CM

## 2025-03-19 DIAGNOSIS — G20.A1 PARKINSON'S DISEASE, UNSPECIFIED WHETHER DYSKINESIA PRESENT, UNSPECIFIED WHETHER MANIFESTATIONS FLUCTUATE (HCC): Primary | ICD-10-CM

## 2025-03-19 PROCEDURE — 97112 NEUROMUSCULAR REEDUCATION: CPT

## 2025-03-19 PROCEDURE — 97110 THERAPEUTIC EXERCISES: CPT | Performed by: PHYSICAL THERAPIST

## 2025-03-19 PROCEDURE — 97530 THERAPEUTIC ACTIVITIES: CPT | Performed by: PHYSICAL THERAPIST

## 2025-03-19 PROCEDURE — 97530 THERAPEUTIC ACTIVITIES: CPT

## 2025-03-19 NOTE — PROGRESS NOTES
Daily Note     Today's date: 3/19/2025  Patient name: Femi Acosta  : 1950  MRN: [id number]  Referring provider: Apolinar Carr MD  Dx:   Encounter Diagnosis     ICD-10-CM    1. Parkinson's disease, unspecified whether dyskinesia present, unspecified whether manifestations fluctuate ([facility])  G20.A1                      Subjective: Patient arrives to OT with spouse, stating he had 1 fall since his last visit when he went to sit on the toilet      Objective: See treatment diary below      Assessment: Patient tolerated treatment fairly. Majority of session spent on educating patient and spouse about Parkinson's Disease, HEP and proper home dosage, current goals of PT treatment, and different ways to direct future treatments to address main deficits. Plan to add additional balance and mobility exercises to boxing program for improved carryover of exercising at home. Provided patient and spouse with HEP, reviewed and completed all exercises, and discussed safety of exercising at home, patient and spouse verbalized agreement. May consider one boxing appointment and one dynamic balance/mobility appointment per week. Patient will continue to benefit from skilled PT services.           Plan: Continue per plan of care.  Progress treatment as tolerated.         Outcome Measures Initial Eval  2025 PN  3/5/25       5xSTS 15.3 sec 13.85 sec       TUG  - Regular  - Cognitive  - Carry   9.1 sec  9.6 sec  9.4 sec   7.69 sec  8.72 sec  8.59 sec       MiniBEST /28        10 meter 1.28 m/s 1.23 m/s       6MWT 1535 ft 1655.8 ft       ABC 68.75% 69.38%       PDQ-39 /100        MDS-UPDRS  Part III   /128        H&Y Stage         Floor > Stand  sec 29.56 sec                       Date 3/17/25 3/19/25   3/10/25 3/12/25   Visit # 8 9   6 7            NMR 40'        Neuro Boxing Warm up -Walking march, cues for height  -Lateral jacks, cues for sequencing      Marching w/ opp UE punch  Toe walking w/ cues for height  Heel  walking  Lateral jacks  All x 1-2 min ea - Standing marching  - FWD step and reach  - LAT step and reach     Shadow boxing 1,2,3,4,5,6 20x each, good effort noted    20x ea 1,2,3,4,5,6 20x ea  1, 2, 3, 4, 5, 6   Circuit 1 -STS w/ Combos, grad inc complexity  -Step up on foam w/ combos  -Floor to ceiling stretches 10x  -    Walking w/ alt 1-2, 3-4 & 5-6 combos  STS w/ combos  Hurdles fwd w/ 1 punch each step 4 laps  Hurdles lateral w/ combos   - Sidestepping on foam > combos  - STS > combos  - Step up on foam > combos    (1 set, 2-3 min ea)   Circuit 2         STS  10x       Standing Marching  10x       Step up  FWD/LAT 10x ea                TE 10'        Patient education Reviewed patient's progress w/ wife and patietn after end of session. Concerns expressed regarding communication, and not being able to observe each session Discussed with patient and spouse HEP and dosage, PD, current goals, and future treatments       Bed mobility         Transfers      Standing to floor x3  Floor to standing x3   HEP  See Below                    Access Code: [email or web address]  URL: [email or web address]  Date: 03/19/2025  Prepared by: Reji Sandoval Jr.    Exercises  - Sit to Stand  - 1 x daily - 7 x weekly - 3 sets - 10 reps  - Step Up  - 1 x daily - 7 x weekly - 3 sets - 10 reps  - Lateral Step Up  - 1 x daily - 7 x weekly - 3 sets - 10 reps  - Standing March with Counter Support  - 1 x daily - 7 x weekly - 3 sets - 10 reps  - Side Stepping with Counter Support  - 1 x daily - 7 x weekly - 3 sets - 10 reps

## 2025-03-19 NOTE — PROGRESS NOTES
"Daily Note     Today's date: 3/19/2025  Patient name: Femi Acosta  : 1950  MRN: [id number]  Referring provider: Apolinar Carr MD  Dx:   Encounter Diagnosis   Name Primary?    Parkinson's disease without fluctuating manifestations, unspecified whether dyskinesia present (HCC) Yes                   Start Time: 930  Stop Time: 1015  Total time in clinic (min): 45 minutes    PLAN OF CARE START: 3/10/2025  PLAN OF CARE END: 6/10/2025  FREQUENCY: 2-3x/week  Precautions: FALL SAFETY,     Subjective: Pt reports \"my R hip hurts, I went to the gym and did machines for my legs and I think I pulled a muscle or something\" later reports I fell last night I can't remember why, I caught myself and did not fall to the floor.\" Pts wife did not know about his fall but reports he was unable to step up 1 stair this morning due to pain. Pt and wife advised to check for signs of injury ex bruising/swelling and contact orthopedic if pain does not subside or worsens. Pt is ambulating with a cane today 2/2 pain in R hip.    Objective: See treatment below.    TA/NMR:   - following 3 step directions: 1. obtaining foam blocks with green clothespin, 2. Writing down a food that begins with that letter, 3. Placing in alphabetical order on table. Working on working memory, recall of directions, alternating attention, visuospatial skills, handwriting, amplified reaching while crossing midline  - review of progress with Pts wife      Assessment: Tolerated treatment well. Improvements noted from last session with multi-step direction following and alternating attention. He required max cues to start today's activity, however this faded to min cues towards the end and improved with repetition. Pt demonstrating slow processing speed, bradykinesia, decreased insight into cognitive deficits.  Pt would benefit from continued OT services to address functional cognition, endurance, LE strength, FMC and dexterity.      Plan: Continued skilled OT " per POC.

## 2025-03-24 ENCOUNTER — OFFICE VISIT (OUTPATIENT)
Facility: CLINIC | Age: 75
End: 2025-03-24
Payer: MEDICARE

## 2025-03-24 DIAGNOSIS — G20.A1 PARKINSON'S DISEASE WITHOUT FLUCTUATING MANIFESTATIONS, UNSPECIFIED WHETHER DYSKINESIA PRESENT (HCC): Primary | ICD-10-CM

## 2025-03-24 DIAGNOSIS — G20.A1 PARKINSON'S DISEASE, UNSPECIFIED WHETHER DYSKINESIA PRESENT, UNSPECIFIED WHETHER MANIFESTATIONS FLUCTUATE (HCC): Primary | ICD-10-CM

## 2025-03-24 PROCEDURE — 97110 THERAPEUTIC EXERCISES: CPT

## 2025-03-24 PROCEDURE — 97112 NEUROMUSCULAR REEDUCATION: CPT

## 2025-03-24 PROCEDURE — 97530 THERAPEUTIC ACTIVITIES: CPT

## 2025-03-24 NOTE — PROGRESS NOTES
"Daily Note     Today's date: 3/24/2025  Patient name: Femi Acosta  : 1950  MRN: [id number]  Referring provider: Apolinar Carr MD  Dx:   Encounter Diagnosis   Name Primary?    Parkinson's disease without fluctuating manifestations, unspecified whether dyskinesia present (HCC) Yes                     Start Time: 930  Stop Time: 1015  Total time in clinic (min): 45 minutes    PLAN OF CARE START: 3/10/2025  PLAN OF CARE END: 6/10/2025  FREQUENCY: 2-3x/week  Precautions: FALL SAFETY,     Subjective: Pt reports \"my hip feels much better, it still gets a little sore but feels okay.\"    Objective: See treatment below.    TA/NMR:   - magnecizer completed for 5 mins, 30 second bursts at a time as an aerobic warm up and primer prior to cognitive based tasks to improve learning  - engaged in game of [employer] with cards with therapist while recalling 3 rules for winning/losing/color, required mod>max cues towards the end however did well to begin this requiring min cues. Pt demonstrating cognitive fatigue as time went on with this task and external distractions increased  - alternating resisted punches in 1 minute standing    Assessment: Tolerated treatment well. Cognitive fatigue noted as cognitive load increased and external distractions increased. Pt demonstrating slow processing speed, bradykinesia, decreased insight into cognitive deficits.  Pt would benefit from continued OT services to address functional cognition, endurance, LE strength, FMC and dexterity.      Plan: Continued skilled OT per POC.      "

## 2025-03-25 NOTE — PROGRESS NOTES
Daily Note     Today's date: 3/24/2025  Patient name: Femi Acsota  : 1950  MRN: [id number]  Referring provider: Apolinar Carr MD  Dx:   Encounter Diagnosis     ICD-10-CM    1. Parkinson's disease, unspecified whether dyskinesia present, unspecified whether manifestations fluctuate ([facility])  G20.A1           Start Time: 1015  Stop Time: 1100  Total time in clinic (min): 45 minutes    Subjective: Patient arrives from OT with no new complaints.       Objective: See treatment diary below      Assessment: Tolerated treatment well and with improving sequencing and wt shifting noted by end of session. Pt noted to have significant difficulty with posture against wall, with pt and wife educated in trialing this daily to continue to improve this area. Also had significantly positive response to scapular mobilization . Patient demonstrated fatigue post treatment, exhibited good technique with therapeutic exercises, and would benefit from continued PT      Plan: Continue per plan of care.  Progress treatment as tolerated.         Outcome Measures Initial Eval  2025 PN  3/5/25       5xSTS 15.3 sec 13.85 sec       TUG  - Regular  - Cognitive  - Carry   9.1 sec  9.6 sec  9.4 sec   7.69 sec  8.72 sec  8.59 sec       MiniBEST /28        10 meter 1.28 m/s 1.23 m/s       6MWT 1535 ft 1655.8 ft       ABC 68.75% 69.38%       PDQ-39 /100        MDS-UPDRS  Part III   /128        H&Y Stage         Floor > Stand  sec 29.56 sec       60 sec STS  3/24: 17 reps                                Date 3/17/25 3/19/25 3/24/25      Visit # 8 9 10               NMR 40'  30'      Neuro Boxing Warm up -Walking march, cues for height  -Lateral jacks, cues for sequencing          Shadow boxing 1,2,3,4,5,6 20x each, good effort noted        Circuit 1 -STS w/ Combos, grad inc complexity  -Step up on foam w/ combos  -Floor to ceiling stretches 10x  -  -Sit to stand w/ crossbody punches & resistance at hips  -Stepping vs resistance  -resisted walk  w/ TB at waist      Circuit 2         STS  10x       Standing Marching  10x       Step up  FWD/LAT 10x ea                TE 10'  10'      Patient education Reviewed patient's progress w/ wife and patietn after end of session. Concerns expressed regarding communication, and not being able to observe each session Discussed with patient and spouse HEP and dosage, PD, current goals, and future treatments Reviewed scapular depression w/ man OP with pt & wife  Reviewed upright posture w/ posture against wall prescribed for carryover at home      Scapular mobs/stretches   LS-w/ + response               Bed mobility         Transfers         HEP  See Below                    Access Code: [email or web address]  URL: [email or web address]  Date: 03/19/2025  Prepared by: Reji Sandoval Jr.    Exercises  - Sit to Stand  - 1 x daily - 7 x weekly - 3 sets - 10 reps  - Step Up  - 1 x daily - 7 x weekly - 3 sets - 10 reps  - Lateral Step Up  - 1 x daily - 7 x weekly - 3 sets - 10 reps  - Standing March with Counter Support  - 1 x daily - 7 x weekly - 3 sets - 10 reps  - Side Stepping with Counter Support  - 1 x daily - 7 x weekly - 3 sets - 10 reps

## 2025-03-26 ENCOUNTER — OFFICE VISIT (OUTPATIENT)
Facility: CLINIC | Age: 75
End: 2025-03-26
Payer: MEDICARE

## 2025-03-26 DIAGNOSIS — G20.A1 PARKINSON'S DISEASE WITHOUT FLUCTUATING MANIFESTATIONS, UNSPECIFIED WHETHER DYSKINESIA PRESENT (HCC): Primary | ICD-10-CM

## 2025-03-26 DIAGNOSIS — G20.A1 PARKINSON'S DISEASE, UNSPECIFIED WHETHER DYSKINESIA PRESENT, UNSPECIFIED WHETHER MANIFESTATIONS FLUCTUATE (HCC): Primary | ICD-10-CM

## 2025-03-26 PROCEDURE — 97110 THERAPEUTIC EXERCISES: CPT | Performed by: PHYSICAL THERAPIST

## 2025-03-26 PROCEDURE — 97530 THERAPEUTIC ACTIVITIES: CPT

## 2025-03-26 PROCEDURE — 97112 NEUROMUSCULAR REEDUCATION: CPT | Performed by: PHYSICAL THERAPIST

## 2025-03-26 NOTE — PROGRESS NOTES
Daily Note     Today's date: 3/24/2025  Patient name: eFmi Acosta  : 1950  MRN: [id number]  Referring provider: Apolinar Carr MD  Dx:   Encounter Diagnosis     ICD-10-CM    1. Parkinson's disease, unspecified whether dyskinesia present, unspecified whether manifestations fluctuate ([facility])  G20.A1                        Subjective: Patient reports no changes since his last visit      Objective: See treatment diary below      Assessment: Patient tolerated treatment well. Challenged patient dynamic balance during step taps, increasing surface height to chair with occasional posterior LOB observed and PT Ryne required. Attempted to increase complexity of punch combinations today, however patient unable to complete requiring PT assistance. Finished treatment with LE stretching to assist with cool down and reduce LE pain/tightness. Patient demonstrated fatigue post treatment, exhibited good technique with therapeutic exercises, and would benefit from continued PT      Plan: Continue per plan of care.  Progress treatment as tolerated.         Outcome Measures Initial Eval  2025 PN  3/5/25       5xSTS 15.3 sec 13.85 sec       TUG  - Regular  - Cognitive  - Carry   9.1 sec  9.6 sec  9.4 sec   7.69 sec  8.72 sec  8.59 sec       MiniBEST /28        10 meter 1.28 m/s 1.23 m/s       6MWT 1535 ft 1655.8 ft       ABC 68.75% 69.38%       PDQ-39 /100        MDS-UPDRS  Part III   /128        H&Y Stage         Floor > Stand  sec 29.56 sec       60 sec STS  3/24: 17 reps                                Date 3/17/25 3/19/25 3/24/25 3/26/25     Visit # 8 9 10 11              NMR 40'  30'      Neuro Boxing Warm up -Walking march, cues for height  -Lateral jacks, cues for sequencing          Shadow boxing 1,2,3,4,5,6 20x each, good effort noted   1,2,3,4,5,6 20x each, good effort noted     Circuit 1 -STS w/ Combos, grad inc complexity  -Step up on foam w/ combos  -Floor to ceiling stretches 10x  -  -Sit to stand w/ crossbody  punches & resistance at hips  -Stepping vs resistance  -resisted walk w/ TB at waist - STS > combos  - Step tap on chair > combos  - Mountain climbers > burnouts     Circuit 2         STS  10x       Standing Marching  10x       Step up  FWD/LAT 10x ea                TE 10'  10'      Patient education Reviewed patient's progress w/ wife and patietn after end of session. Concerns expressed regarding communication, and not being able to observe each session Discussed with patient and spouse HEP and dosage, PD, current goals, and future treatments Reviewed scapular depression w/ man OP with pt & wife  Reviewed upright posture w/ posture against wall prescribed for carryover at home      Scapular mobs/stretches   LS-w/ + response      Physioball rollouts    10x 10 sec holds     LE stretching    HS stretch: 2 min ea  Gastroc stretch: 2 min ea     TA         Bed mobility         Transfers         HEP  See Below                    Access Code: [email or web address]  URL: [email or web address]  Date: 03/19/2025  Prepared by: Reji Sandoval Jr.    Exercises  - Sit to Stand  - 1 x daily - 7 x weekly - 3 sets - 10 reps  - Step Up  - 1 x daily - 7 x weekly - 3 sets - 10 reps  - Lateral Step Up  - 1 x daily - 7 x weekly - 3 sets - 10 reps  - Standing March with Counter Support  - 1 x daily - 7 x weekly - 3 sets - 10 reps  - Side Stepping with Counter Support  - 1 x daily - 7 x weekly - 3 sets - 10 reps

## 2025-03-26 NOTE — PROGRESS NOTES
"Daily Note     Today's date: 3/26/2025  Patient name: Femi Acosta  : 1950  MRN: [id number]  Referring provider: Apolinar Carr MD  Dx:   Encounter Diagnosis   Name Primary?    Parkinson's disease without fluctuating manifestations, unspecified whether dyskinesia present (HCC) Yes         Start Time: 1015  Stop Time: 1100  Total time in clinic (min): 45 minutes    PLAN OF CARE START: 3/10/2025  PLAN OF CARE END: 6/10/2025  FREQUENCY: 2-3x/week  Precautions: FALL SAFETY,     Subjective: Pt reports \"my hip feels much better, it still gets a little sore but feels okay.\"    Objective: See treatment below.    TA:   - magnecizer x6 min alternating speeds with bursts every 30 seconds, working on increasing aerobic work, endurance  - simulating weed pulling in the garden: attempted in in half kneel position, crouching, and quadruped, pulling squigz off board on the ground. Required 2 rest breaks, discussion on pacing self in the garden. Demonstrated safe descend to the ground and ascend without using hands on external support to simulate being in the garden  - review of safe technique to stand from the ground with Pts wife with training and EDU provided, Pt and wife demonstrated safe understanding to facilitate gardening tasks    Assessment: Tolerated treatment well. Pt demonstrated safe floor transfers with VC from therapist and wife to facilitate gardening tasks at home. Pt demonstrating slow processing speed, bradykinesia, decreased insight into cognitive deficits.  Pt would benefit from continued OT services to address functional cognition, endurance, LE strength, FMC and dexterity.      Plan: Continued skilled OT per POC.      "

## 2025-03-31 ENCOUNTER — OFFICE VISIT (OUTPATIENT)
Facility: CLINIC | Age: 75
End: 2025-03-31
Payer: MEDICARE

## 2025-03-31 DIAGNOSIS — G20.A1 PARKINSON'S DISEASE WITHOUT FLUCTUATING MANIFESTATIONS, UNSPECIFIED WHETHER DYSKINESIA PRESENT (HCC): Primary | ICD-10-CM

## 2025-03-31 DIAGNOSIS — G20.A1 PARKINSON'S DISEASE, UNSPECIFIED WHETHER DYSKINESIA PRESENT, UNSPECIFIED WHETHER MANIFESTATIONS FLUCTUATE (HCC): Primary | ICD-10-CM

## 2025-03-31 PROCEDURE — 97112 NEUROMUSCULAR REEDUCATION: CPT

## 2025-03-31 PROCEDURE — 97110 THERAPEUTIC EXERCISES: CPT

## 2025-03-31 PROCEDURE — 97530 THERAPEUTIC ACTIVITIES: CPT

## 2025-03-31 NOTE — PROGRESS NOTES
"Daily Note     Today's date: 3/31/2025  Patient name: Femi Acosta  : 1950  MRN: [id number]  Referring provider: Apolinar Carr MD  Dx:   Encounter Diagnosis   Name Primary?    Parkinson's disease without fluctuating manifestations, unspecified whether dyskinesia present (HCC) Yes           Start Time: 1400  Stop Time: 1445  Total time in clinic (min): 45 minutes    PLAN OF CARE START: 3/10/2025  PLAN OF CARE END: 6/10/2025  FREQUENCY: 2-3x/week  Precautions: FALL SAFETY,     Subjective: Pt reports \"my weekend was full of arguments, you don't want to hear about it.\"    Objective: See treatment below.    TA/NM:   - magnecizer x6 min alternating speeds with bursts every 30 seconds, changing directions detention through working on increasing aerobic work, endurance, priming prior to cognitive work  - resisted reaching to overhead cabinet to place squigz on counter with handwriting component while standing to work on functional reaching, FMC, alternating attention, language skills      Assessment: Tolerated treatment well. He required min>mod cues for naming component of task today. Handwriting legibility and alignment declined when writing in standing. Pt demonstrating slow processing speed, bradykinesia, decreased insight into cognitive deficits.  Pt would benefit from continued OT services to address functional cognition, endurance, LE strength, FMC and dexterity.      Plan: Continued skilled OT per POC.      "

## 2025-03-31 NOTE — PROGRESS NOTES
Daily Note     Today's date: 3/31/2025  Patient name: Femi Acosta  : 1950  MRN: [id number]  Referring provider: Apolinar Carr MD  Dx:   Encounter Diagnosis     ICD-10-CM    1. Parkinson's disease, unspecified whether dyskinesia present, unspecified whether manifestations fluctuate (HCC)  G20.A1           Start Time: 1445  Stop Time: 1530  Total time in clinic (min): 45 minutes    Subjective: Patient arrives from OT with no new complaints. Wife had several questions about neuro boxing, his HEP and how to complete several items instructed last time, with time spent reviewing as well at start of session.      Objective: See treatment diary below      Assessment: Tolerated treatment well and with improving wt shifting, functional transitions, and overall coordination demonstrated today. Patient continues to require increased processing time to remember number sequencing for neuro boxing techniques. Pt progressing well overall . Patient demonstrated fatigue post treatment, exhibited good technique with therapeutic exercises, and would benefit from continued PT      Plan: Continue per plan of care.  Progress treatment as tolerated.         Outcome Measures Initial Eval  2025 PN  3/5/25       5xSTS 15.3 sec 13.85 sec       TUG  - Regular  - Cognitive  - Carry   9.1 sec  9.6 sec  9.4 sec   7.69 sec  8.72 sec  8.59 sec       MiniBEST /28        10 meter 1.28 m/s 1.23 m/s       6MWT 1535 ft 1655.8 ft       ABC 68.75% 69.38%       PDQ-39 /100        MDS-UPDRS  Part III   /128        H&Y Stage         Floor > Stand  sec 29.56 sec       60 sec STS  3/24: 17 reps                                Date 3/17/25 3/19/25 3/24/25 3/26/25 3/31/25    Visit # 8 9 10 11 12             NMR 40'  30'  30'    Neuro Boxing Warm up -Walking march, cues for height  -Lateral jacks, cues for sequencing          Shadow boxing 1,2,3,4,5,6 20x each, good effort noted   1,2,3,4,5,6 20x each, good effort noted 1+2,3+4,5+6 20x each, emphasis  "on alternating UE, inc power    Circuit 1 -STS w/ Combos, grad inc complexity  -Step up on foam w/ combos  -Floor to ceiling stretches 10x  -  -Sit to stand w/ crossbody punches & resistance at hips  -Stepping vs resistance  -resisted walk w/ TB at waist - STS > combos  - Step tap on chair > combos  - Mountain climbers > burnouts STS w/ harness on, resist at hips provided  Alt LE taps on 8\" step w/ combos  Fwd/retro walk w/ punches  Sidestep w/ punches in harness, blazepods used, shuffle between 3x2' 30\" breaks    Circuit 2     Sissel seat-  Seated cervical rotation, upper trunk rotation, alt LAQ, alt hip flexion.    STS  10x       Standing Marching  10x       Step up  FWD/LAT 10x ea                TE 10'  10'  8'    Patient education Reviewed patient's progress w/ wife and patietn after end of session. Concerns expressed regarding communication, and not being able to observe each session Discussed with patient and spouse HEP and dosage, PD, current goals, and future treatments Reviewed scapular depression w/ man OP with pt & wife  Reviewed upright posture w/ posture against wall prescribed for carryover at home  Reviewed again scapular depression, rationale for standing with back to wall    Scapular mobs/stretches   LS-w/ + response      Physioball rollouts    10x 10 sec holds     LE stretching    HS stretch: 2 min ea  Gastroc stretch: 2 min ea     TA         Bed mobility         Transfers         HEP  See Below                    Access Code: [email or web address]  URL: [email or web address]  Date: 03/19/2025  Prepared by: Reji Sandoval Jr.    Exercises  - Sit to Stand  - 1 x daily - 7 x weekly - 3 sets - 10 reps  - Step Up  - 1 x daily - 7 x weekly - 3 sets - 10 reps  - Lateral Step Up  - 1 x daily - 7 x weekly - 3 sets - 10 reps  - Standing March with Counter Support  - 1 x daily - 7 x weekly - 3 sets - 10 reps  - Side Stepping with Counter Support  - 1 x daily - 7 x weekly - 3 sets - 10 reps         "

## 2025-04-02 ENCOUNTER — EVALUATION (OUTPATIENT)
Facility: CLINIC | Age: 75
End: 2025-04-02
Payer: MEDICARE

## 2025-04-02 DIAGNOSIS — G20.A1 PARKINSON'S DISEASE, UNSPECIFIED WHETHER DYSKINESIA PRESENT, UNSPECIFIED WHETHER MANIFESTATIONS FLUCTUATE (HCC): Primary | ICD-10-CM

## 2025-04-02 PROCEDURE — 97530 THERAPEUTIC ACTIVITIES: CPT | Performed by: PHYSICAL THERAPIST

## 2025-04-02 NOTE — LETTER
2025    Apolinar Carr MD  [address]  [address]  [facility] [address]    Patient: Femi Acosta   YOB: 1950   Date of Visit: 2025     Encounter Diagnosis     ICD-10-CM    1. Parkinson's disease, unspecified whether dyskinesia present, unspecified whether manifestations fluctuate (HCC)  G20.A1           Dear Dr. Apolinar Carr MD:    Thank you for your recent referral of Femi Acosta. Please review the attached evaluation summary from Femi's recent visit.     Please verify that you agree with the plan of care by signing the attached order.     If you have any questions or concerns, please do not hesitate to call.     I sincerely appreciate the opportunity to share in the care of one of your patients and hope to have another opportunity to work with you in the near future.       Sincerely,    Reji Sandoval Jr, PT      Referring Provider:      I certify that I have read the below Plan of Care and certify the need for these services furnished under this plan of treatment while under my care.                    Apolinar Carr MD  [address]  [address]  [facility] [address]  Via Fax: [phone]          PT Progress Note    Today's date: 2025  Patient name: Femi Acosta  : 1950  MRN: [id number]  Referring provider: Apolinar Carr MD  Dx:   Encounter Diagnosis     ICD-10-CM    1. Parkinson's disease, unspecified whether dyskinesia present, unspecified whether manifestations fluctuate (HCC)  G20.A1                     Assessment  Assessment details: Patient is a 74 y.o. Male who has been attending skilled outpatient PT with complaints of gait instability and balance deficits resulting in falls. Over the last month patient reports that he has had at least 1 fall while he was outside walking. Since his most recent progress note, patient displays improvements with his 5xSTS, TUG and variations, and 10 MWT. Improvements with these objective measures suggest improvements with his  functional LE strength, improved gait speed, and maintains low risk of falls. Patient maintained similar score for his 6 MWT and ABC score, with minor regressions observed however not enough to reflect MDC. When standing from supine position patient required 12.97 seconds which displays significant improvement from last month. Due to recent falls and neurodegenerative diagnosis, plan to continue with skilled PT services. Patient is interested in the following program to address noted deficits: Rock Steady Boxing. Per research provided by [employer], patient will benefit from skilled outpatient PT services to improve and maximize hisfunction, to reduce risk for falls and potential injuries associated with falls, reduce healthcare costs via hospitalization, and reduce patient and national healthcare costs. In early stages of Parkinson's Disease, research indicates that intensive physical exercise can improve patient's motor control and assist in slowing the disease progression as a neuroprotective agent. Patient will benefit from skilled outpatient PT in order to maximize function in the short-term and long-term with overall improved postural strength with associated stability and mobility.      Impairments: Abnormal gait, Activity intolerance, Impaired balance, Impaired physical strength, Lacks appropriate HEP, Poor posture, Poor body mechanics, Safety issue  Understanding of Dx/Px/POC: Good  Prognosis: Good      Patient verbalized understanding of POC.    Please contact me if you have any questions or recommendations. Thank you for the referral and the opportunity to share in Femi Acosta's care.           Plan  Plan details: miniBEST  Program: Rock Steady Boxing  Planned modality interventions: Biofeedback, Cryotherapy, TENS, Thermotherapy  Planned therapy interventions: Abdominal trunk stabilization, ADL training, Balance, Balance/WB training, Breathing training, Body mechanics training, Coordination, Functional ROM  exercises, Gait training, HEP, Joint Mobilization, Manual Therapy, Saleem taping, Motor coordination training, Neuromuscular re-education, Patient education, Postural training, Strengthening, Stretching, Therapeutic activities, Therapeutic exercises, Therapeutic training, Transfer training, Activity modification, Work reintegration  Frequency: 2x/wk  Duration in weeks: 12  Plan of Care beginning date: 4/2/2025  Plan of Care expiration date: 12 weeks - 6/25/2025  Treatment plan discussed with: Patient         Goals  Short Term Goals (4 weeks):  - Patient will improve TUG score by MDC of 4.8 sec in order to reduce risk of falls and to increase safety with functional mobility- Progressing  - Patient will improve 5 STS by the MDC of 2.3 sec indicating an improvement in strength and decreased challenge with transfers  - Patient will improve 6 MWT by the MDC of 269 ft indicating an improvement in cardiovascular endurance in order to maximize function with functional mobility throughout the community  - Patient will improve MiniBESTest score by MDC of 5.52 points indicating an improvement with dynamic balance in order to further decrease risk of falls with functional tasks  - Patient will be independent in basic HEP in order to manage condition at home    Long Term Goals (12 weeks):  - Patient will score a low risk for falls 2/4 fall risks measures  - Patient will score age norm values for 1/2 endurance measures  - Patient will be able to ambulate on uneven surfaces with 50% reduction in near falls to increase safety with community mobility  - Patient will be able to perform a floor transfer without physical assistance to assist with fall recovery at home and in the community  - Patient will be independent in comprehensive HEP post discharge from program  - Patient will be able to walk up incline with decreased difficulty and no loss of balance in order to improve functional mobility throughout the community  - Patient  will be able to perform sit to stands from softer surfaces such as a couch or bed in order to improvement function with transfers  - Patient will be consistent with program for at least 1 day per week to assist with management of condition and functional independence of their condition        Cut off score   All date taken from [facility] Neuro Section or Rehab Measures      ALONSO Cutoff Scores:  MDC: 5 pts  Falls Risk Cutoff: 40.22/56 DGI Cutoff Scores:  Luke et al 2011, MDC: 2.9 pts  Harika et al 2008, Artie: Score <19/24   MiniBEST Cutoff Scores:  Ambrosio et al 2011, MDC: 5.52 pts  Zion 2013, Artie: <19/28 5xSTS Cutoff Scores:  MDC: 2.3 sec  Flaco et al, 2011, Artie: > 16 sec   TUG Cutoff Scores:  Demetrius Rivera et al, 2011, MDC: 4.8 sec  Julio Cesar et al, 2011, Fallers: Meds ON: < 12.21 sec, OFF: 15.5 sec 10 Meter Walk Test Cutoff Scores:  Jevon, 2008, MDC: 0.18 m/s  Age Norm Values, Artie: < 1.0 m/s   ABC Cutoff Scores:  Allyson, 2008, MDC: 13 pts  Demetrius Quinn, MDC: 11.12 pts  Increased risk for falls: < 69% 6 Minute Walk Test Cutoff Scores:  Jevon, 2008, MDC: 269 ft  Oleg et al, 2002, Norm Data Healthy Adults:  60 - 69 years:   M: 572 m      F: 538 m  70 - 79 years:   M: 527 m      F: 471 m  80 - 89 years:   M: 417 m      F: 392 m   PDQ-39 Cutoff Scores:  Abhishek et al, 2004:  MDC: Mobility (12.24), ADL (16.72), Emotional (14.22), Stigma (21.21), Social Support (21.25), Cognition (21.12), Communication (21.04), Bodily Discomfort (24.48)  Sarthak et al, 2007:  Normative Data: Mobility (49.25), ADL (38.94), Emotional (37.92), Stigma (27.54), Social Support (14.78), Cognition (33.03), Communication (27.99), Bodily Discomfort (40.91) Deangelo and Yahr Stages  Stage 0: No S/S  Stage 1: Mild unilateral symptoms  Stage 1.5: Unilateral and axial symptoms  Stage 2: Bilateral symptoms, no balance impairment  Stage 2.5: Mild bilateral symptoms and recovery with pull  "test  Stage 3: Mild to moderate postural instability, independent  Stage 4: Severe disability, walking or standing unassisted  Stage 5: Bed bound, w/c bound           Subjective Evaluation    History of Present Illness  Mechanism of injury: Patient arrives with wife for evaluation. He was diagnosed with PD approximately 3-4 years ago, they are interested in neuro boxing program. He was recently seen by angelo for PT, was seeing OT in [place] but would like to change all services to [place] location to be closer to home. Patient has had 1 fall in the last month. Difficulties with dual tasking    Update 3/5/25: Patient reports that he was having some dizziness this morning. Also reports he has had at least 1 fall over the last month. Would like to continue with the boxing program.     Update 25: Patient reports that he is seeing too many doctors which he reports is \"how it goes\" with Parkinson's. He reports fair compliance with his HEP, would like to continue to progress his exercises.   Primary AD: none    Patient goal: reduce risk of falls, improve seated/standing from chairs    Pain  Current pain ratin/10    Social Support  Steps to enter house: 3-4 steps  Stairs in house: 1 flight   Lives in: wife  Lives with: house    Employment status: Retired    Treatments  Previous treatment: PT, OT  Current treatment: OT      Objective   HR: 73 bpm  SpO2: 98%  BP: 124/68 mmHg    Gait abnormalities: reduced UE swing, anterior trunk lean        Outcome Measures Initial Eval  2025 PN  3/5/25 PN  25      5xSTS 15.3 sec 13.85 sec 11.09 sec      TUG  - Regular  - Cognitive  - Carry   9.1 sec  9.6 sec  9.4 sec   7.69 sec  8.72 sec  8.59 sec   7.62 sec  7.07 sec  7.35 sec      MiniBEST /28        10 meter 1.28 m/s 1.23 m/s 1.41 m/s      6MWT 1535 ft 1655.8 ft 1643 ft      ABC 68.75% 69.38% 67.5%      PDQ-39 /100        MDS-UPDRS  Part III   /128        H&Y Stage         Floor > Stand  sec 29.56 sec 12.97 " sec                    Precautions:   Past Medical History:   Diagnosis Date   • Allergic rhinitis    • Constipation    • GERD (gastroesophageal reflux disease)    • Hyperlipidemia    • Overactive bladder    • Parkinson disease (HCC)    • Prostate enlargement

## 2025-04-02 NOTE — PROGRESS NOTES
PT Progress Note    Today's date: 2025  Patient name: Femi Acosta  : 1950  MRN: [id number]  Referring provider: Apolinar Carr MD  Dx:   Encounter Diagnosis     ICD-10-CM    1. Parkinson's disease, unspecified whether dyskinesia present, unspecified whether manifestations fluctuate ([facility])  G20.A1                     Assessment  Assessment details: Patient is a 74 y.o. Male who has been attending skilled outpatient PT with complaints of gait instability and balance deficits resulting in falls. Over the last month patient reports that he has had at least 1 fall while he was outside walking. Since his most recent progress note, patient displays improvements with his 5xSTS, TUG and variations, and 10 MWT. Improvements with these objective measures suggest improvements with his functional LE strength, improved gait speed, and maintains low risk of falls. Patient maintained similar score for his 6 MWT and ABC score, with minor regressions observed however not enough to reflect MDC. When standing from supine position patient required 12.97 seconds which displays significant improvement from last month. Due to recent falls and neurodegenerative diagnosis, plan to continue with skilled PT services. Patient is interested in the following program to address noted deficits: [employer]. Per research provided by [employer], patient will benefit from skilled outpatient PT services to improve and maximize hisfunction, to reduce risk for falls and potential injuries associated with falls, reduce healthcare costs via hospitalization, and reduce patient and national healthcare costs. In early stages of Parkinson's Disease, research indicates that intensive physical exercise can improve patient's motor control and assist in slowing the disease progression as a neuroprotective agent. Patient will benefit from skilled outpatient PT in order to maximize function in the short-term and long-term with overall improved postural  strength with associated stability and mobility.      Impairments: Abnormal gait, Activity intolerance, Impaired balance, Impaired physical strength, Lacks appropriate HEP, Poor posture, Poor body mechanics, Safety issue  Understanding of Dx/Px/POC: Good  Prognosis: Good      Patient verbalized understanding of POC.    Please contact me if you have any questions or recommendations. Thank you for the referral and the opportunity to share in Femi Acosta's care.           Plan  Plan details: miniBEST  Program: [employer]  Planned modality interventions: Biofeedback, Cryotherapy, TENS, Thermotherapy  Planned therapy interventions: Abdominal trunk stabilization, ADL training, Balance, Balance/WB training, Breathing training, Body mechanics training, Coordination, Functional ROM exercises, Gait training, HEP, Joint Mobilization, Manual Therapy, Saleem taping, Motor coordination training, Neuromuscular re-education, Patient education, Postural training, Strengthening, Stretching, Therapeutic activities, Therapeutic exercises, Therapeutic training, Transfer training, Activity modification, Work reintegration  Frequency: 2x/wk  Duration in weeks: 12  Plan of Care beginning date: 4/2/2025  Plan of Care expiration date: 12 weeks - 6/25/2025  Treatment plan discussed with: Patient         Goals  Short Term Goals (4 weeks):  - Patient will improve TUG score by MDC of 4.8 sec in order to reduce risk of falls and to increase safety with functional mobility- Progressing  - Patient will improve 5 STS by the MDC of 2.3 sec indicating an improvement in strength and decreased challenge with transfers  - Patient will improve 6 MWT by the MDC of 269 ft indicating an improvement in cardiovascular endurance in order to maximize function with functional mobility throughout the community  - Patient will improve MiniBESTest score by MDC of 5.52 points indicating an improvement with dynamic balance in order to further decrease  risk of falls with functional tasks  - Patient will be independent in basic HEP in order to manage condition at home    Long Term Goals (12 weeks):  - Patient will score a low risk for falls 2/4 fall risks measures  - Patient will score age norm values for 1/2 endurance measures  - Patient will be able to ambulate on uneven surfaces with 50% reduction in near falls to increase safety with community mobility  - Patient will be able to perform a floor transfer without physical assistance to assist with fall recovery at home and in the community  - Patient will be independent in comprehensive HEP post discharge from program  - Patient will be able to walk up incline with decreased difficulty and no loss of balance in order to improve functional mobility throughout the community  - Patient will be able to perform sit to stands from softer surfaces such as a couch or bed in order to improvement function with transfers  - Patient will be consistent with program for at least 1 day per week to assist with management of condition and functional independence of their condition        Cut off score   All date taken from [employer] Neuro Section or Rehab Measures      ALONSO Cutoff Scores:  MDC: 5 pts  Falls Risk Cutoff: 40.22/56 DGI Cutoff Scores:  Luke et al 2011, MDC: 2.9 pts  Harika et al 2008, Artie: Score <19/24   MiniBEST Cutoff Scores:  Ambrosio et al 2011, MDC: 5.52 pts  Zaki and Randell 2013, Artie: <19/28 5xSTS Cutoff Scores:  MDC: 2.3 sec  Flaco et al, 2011, Artie: > 16 sec   TUG Cutoff Scores:  Demetrius Rivera et al, 2011, MDC: 4.8 sec  Julio Cesar et al, 2011, Fallers: Meds ON: < 12.21 sec, OFF: 15.5 sec 10 Meter Walk Test Cutoff Scores:  Jevon, 2008, MDC: 0.18 m/s  Age Norm Values, Artie: < 1.0 m/s   ABC Cutoff Scores:  Allyson, 2008, MDC: 13 pts  Demetrius Quinn, MDC: 11.12 pts  Increased risk for falls: < 69% 6 Minute Walk Test Cutoff Scores:  Jevon, 2008, MDC: 269 ft  Bassem,  "2002, Norm Data Healthy Adults:  60 - 69 years:   M: 572 m      F: 538 m  70 - 79 years:   M: 527 m      F: 471 m  80 - 89 years:   M: 417 m      F: 392 m   PDQ-39 Cutoff Scores:  Abhishek et al, 2004:  MDC: Mobility (12.24), ADL (16.72), Emotional (14.22), Stigma (21.21), Social Support (21.25), Cognition (21.12), Communication (21.04), Bodily Discomfort (24.48)  Sarthak et al, 2007:  Normative Data: Mobility (49.25), ADL (38.94), Emotional (37.92), Stigma (27.54), Social Support (14.78), Cognition (33.03), Communication (27.99), Bodily Discomfort (40.91) Deangelo and Yahr Stages  Stage 0: No S/S  Stage 1: Mild unilateral symptoms  Stage 1.5: Unilateral and axial symptoms  Stage 2: Bilateral symptoms, no balance impairment  Stage 2.5: Mild bilateral symptoms and recovery with pull test  Stage 3: Mild to moderate postural instability, independent  Stage 4: Severe disability, walking or standing unassisted  Stage 5: Bed bound, w/c bound           Subjective Evaluation    History of Present Illness  Mechanism of injury: Patient arrives with wife for evaluation. He was diagnosed with PD approximately 3-4 years ago, they are interested in neuro boxing program. He was recently seen by angelo for PT, was seeing OT in [place] but would like to change all services to [place] location to be closer to home. Patient has had 1 fall in the last month. Difficulties with dual tasking    Update 3/5/25: Patient reports that he was having some dizziness this morning. Also reports he has had at least 1 fall over the last month. Would like to continue with the boxing program.     Update 25: Patient reports that he is seeing too many doctors which he reports is \"how it goes\" with Parkinson's. He reports fair compliance with his HEP, would like to continue to progress his exercises.   Primary AD: none    Patient goal: reduce risk of falls, improve seated/standing from chairs    Pain  Current pain ratin/10    Social " Support  Steps to enter house: 3-4 steps  Stairs in house: 1 flight   Lives in: wife  Lives with: house    Employment status: Retired    Treatments  Previous treatment: PT, OT  Current treatment: OT      Objective   HR: 73 bpm  SpO2: 98%  BP: 124/68 mmHg    Gait abnormalities: reduced UE swing, anterior trunk lean        Outcome Measures Initial Eval  2/5/2025 PN  3/5/25 PN  4/2/25      5xSTS 15.3 sec 13.85 sec 11.09 sec      TUG  - Regular  - Cognitive  - Carry   9.1 sec  9.6 sec  9.4 sec   7.69 sec  8.72 sec  8.59 sec   7.62 sec  7.07 sec  7.35 sec      MiniBEST /28        10 meter 1.28 m/s 1.23 m/s 1.41 m/s      6MWT 1535 ft 1655.8 ft 1643 ft      ABC 68.75% 69.38% 67.5%      PDQ-39 /100        MDS-UPDRS  Part III   /128        H&Y Stage         Floor > Stand  sec 29.56 sec 12.97 sec                    Precautions:   Past Medical History:   Diagnosis Date    Allergic rhinitis     Constipation     GERD (gastroesophageal reflux disease)     Hyperlipidemia     Overactive bladder     Parkinson disease (HCC)     Prostate enlargement

## 2025-04-07 ENCOUNTER — OFFICE VISIT (OUTPATIENT)
Facility: CLINIC | Age: 75
End: 2025-04-07
Payer: MEDICARE

## 2025-04-07 DIAGNOSIS — G20.A1 PARKINSON'S DISEASE WITHOUT FLUCTUATING MANIFESTATIONS, UNSPECIFIED WHETHER DYSKINESIA PRESENT (HCC): Primary | ICD-10-CM

## 2025-04-07 DIAGNOSIS — G20.A1 PARKINSON'S DISEASE, UNSPECIFIED WHETHER DYSKINESIA PRESENT, UNSPECIFIED WHETHER MANIFESTATIONS FLUCTUATE (HCC): Primary | ICD-10-CM

## 2025-04-07 PROCEDURE — 97530 THERAPEUTIC ACTIVITIES: CPT

## 2025-04-07 PROCEDURE — 97112 NEUROMUSCULAR REEDUCATION: CPT

## 2025-04-07 PROCEDURE — 97110 THERAPEUTIC EXERCISES: CPT

## 2025-04-07 NOTE — PROGRESS NOTES
Daily Note     Today's date: 2025  Patient name: Femi Acosta  : 1950  MRN: [id number]  Referring provider: Apolinar Carr MD  Dx:   Encounter Diagnosis     ICD-10-CM    1. Parkinson's disease, unspecified whether dyskinesia present, unspecified whether manifestations fluctuate (HCC)  G20.A1           Start Time: 0930  Stop Time: 1015  Total time in clinic (min): 45 minutes    Subjective: Patient arrives with no new complaints.       Objective: See treatment diary below      Assessment: Tolerated treatment  well, with pt challenged by varied exercises today. Pt noted to be concerned at one point about differences in opinion between him and his wife about how much exercising he should be doing. Pt and therapist discussed working on bursts of exercise during the day (3-5 min every hour) in addition to his LSVT exercises and daily walking, such as sidestepping, fwd/retro walk, or sit to stands in one minute circuits . Patient demonstrated fatigue post treatment, exhibited good technique with therapeutic exercises, and would benefit from continued PT      Plan: Continue per plan of care.  Progress note during next visit.  Progress treatment as tolerated.         Outcome Measures Initial Eval  2025 PN  3/5/25       5xSTS 15.3 sec 13.85 sec       TUG  - Regular  - Cognitive  - Carry   9.1 sec  9.6 sec  9.4 sec   7.69 sec  8.72 sec  8.59 sec       MiniBEST /28        10 meter 1.28 m/s 1.23 m/s       6MWT 1535 ft 1655.8 ft       ABC 68.75% 69.38%       PDQ-39 /100        MDS-UPDRS  Part III   /128        H&Y Stage         Floor > Stand  sec 29.56 sec       60 sec STS  3/24: 17 reps                                Date 3/17/25 3/19/25 3/24/25 3/26/25 3/31/25 4/7/25   Visit # 8 9 10 11 12 13            NMR 40'  30'  30' 30'   Neuro Boxing Warm up -Walking march, cues for height  -Lateral jacks, cues for sequencing          Shadow boxing 1,2,3,4,5,6 20x each, good effort noted   1,2,3,4,5,6 20x each, good  "effort noted 1+2,3+4,5+6 20x each, emphasis on alternating UE, inc power    Circuit 1 -STS w/ Combos, grad inc complexity  -Step up on foam w/ combos  -Floor to ceiling stretches 10x  -  -Sit to stand w/ crossbody punches & resistance at hips  -Stepping vs resistance  -resisted walk w/ TB at waist - STS > combos  - Step tap on chair > combos  - Mountain climbers > burnouts STS w/ harness on, resist at hips provided  Alt LE taps on 8\" step w/ combos  Fwd/retro walk w/ punches  Sidestep w/ punches in harness, blazepods used, shuffle between 3x2' 30\" breaks Sideshuffle in harness  Fwd/retro walk  Alt lunges    3x1' each   Circuit 2     Sissel seat-  Seated cervical rotation, upper trunk rotation, alt LAQ, alt hip flexion. Sit to stand  Lateral stepping alt LE  3x1' each   STS  10x       Standing Marching  10x       Step up  FWD/LAT 10x ea    Fwd/lateral in solostep 6\" step alt LE 2x1' each            TE 10'  10'  8' 10'   Patient education Reviewed patient's progress w/ wife and patietn after end of session. Concerns expressed regarding communication, and not being able to observe each session Discussed with patient and spouse HEP and dosage, PD, current goals, and future treatments Reviewed scapular depression w/ man OP with pt & wife  Reviewed upright posture w/ posture against wall prescribed for carryover at home  Reviewed again scapular depression, rationale for standing with back to wall Reviewed pt concerns re: how much time each day should be spent exercising   Scapular mobs/stretches   LS-w/ + response      Physioball rollouts    10x 10 sec holds     LE stretching    HS stretch: 2 min ea  Gastroc stretch: 2 min ea  HS stretch 2x1' stretches, cues for form   TA         Bed mobility         Transfers         HEP  See Below                    Access Code: [email or web address]  URL: [email or web address]  Date: 03/19/2025  Prepared by: Reji Sandoval Jr.    Exercises  - Sit to Stand  - 1 x daily - 7 x weekly - 3 " sets - 10 reps  - Step Up  - 1 x daily - 7 x weekly - 3 sets - 10 reps  - Lateral Step Up  - 1 x daily - 7 x weekly - 3 sets - 10 reps  - Standing March with Counter Support  - 1 x daily - 7 x weekly - 3 sets - 10 reps  - Side Stepping with Counter Support  - 1 x daily - 7 x weekly - 3 sets - 10 reps

## 2025-04-07 NOTE — PROGRESS NOTES
"Daily Note     Today's date: 2025  Patient name: Femi Acosta  : 1950  MRN: [id number]  Referring provider: Apolinar Carr MD  Dx:   Encounter Diagnosis   Name Primary?    Parkinson's disease without fluctuating manifestations, unspecified whether dyskinesia present (HCC) Yes         Start Time: 1015  Stop Time: 1100  Total time in clinic (min): 45 minutes    PLAN OF CARE START: 3/10/2025  PLAN OF CARE END: 6/10/2025  FREQUENCY: 2-3x/week  Precautions: FALL SAFETY,     Subjective: Pt reports \"I watched some basketball games this weekend\"      Objective: See treatment below.      TA/NM:   - magnecizer x6 min alternating speeds with bursts every 30 seconds, changing directions residential through working on increasing aerobic work, endurance, priming prior to cognitive work  - card sorting activity completed while tapping blaze pods to work on alternating attention, divided attention, working memory  - standing at whiteboard completed handwriting activity: used green clothespin to obtain letter cubes and listing words based on letter on the cube, working on FMC, pinch strength, and handwriting while standing to simulate writing on his calendar at home        Assessment: Tolerated treatment well. Difficulty with alternating attention during card sorting and blaze pod tapping task today, requiring verbal and gesture cueing. Handwriting legibility decreased when writing in standing. Pt demonstrating slow processing speed, bradykinesia, decreased insight into cognitive deficits.  Pt would benefit from continued OT services to address functional cognition, endurance, LE strength, FMC and dexterity.      Plan: Continued skilled OT per POC.      "

## 2025-04-09 ENCOUNTER — OFFICE VISIT (OUTPATIENT)
Facility: CLINIC | Age: 75
End: 2025-04-09
Payer: MEDICARE

## 2025-04-09 DIAGNOSIS — G20.A1 PARKINSON'S DISEASE, UNSPECIFIED WHETHER DYSKINESIA PRESENT, UNSPECIFIED WHETHER MANIFESTATIONS FLUCTUATE (HCC): Primary | ICD-10-CM

## 2025-04-09 DIAGNOSIS — G20.A1 PARKINSON'S DISEASE WITHOUT FLUCTUATING MANIFESTATIONS, UNSPECIFIED WHETHER DYSKINESIA PRESENT (HCC): Primary | ICD-10-CM

## 2025-04-09 PROCEDURE — 97112 NEUROMUSCULAR REEDUCATION: CPT

## 2025-04-09 PROCEDURE — 97112 NEUROMUSCULAR REEDUCATION: CPT | Performed by: PHYSICAL THERAPIST

## 2025-04-09 PROCEDURE — 97530 THERAPEUTIC ACTIVITIES: CPT

## 2025-04-09 NOTE — PROGRESS NOTES
"Daily Note     Today's date: 2025  Patient name: Femi Acosta  : 1950  MRN: [id number]  Referring provider: Apolinar Carr MD  Dx:   Encounter Diagnosis     ICD-10-CM    1. Parkinson's disease, unspecified whether dyskinesia present, unspecified whether manifestations fluctuate ([facility])  G20.A1                      Subjective: Patient arrives with no new complaints.       Objective: See treatment diary below      Assessment: Patient tolerated treatment well. Challenged patient dynamic balance with forward and lateral luis navigation, completing each rep with punch combinations. Patient displays instability when stepping to left side however any LOB observed was self corrected. Dual tasking added today with card shuffle on foam, with patient displaying difficulty maintaining both tasks. Plan to incorporate into future sessions. Patient demonstrated fatigue post treatment, exhibited good technique with therapeutic exercises, and would benefit from continued PT      Plan: Continue per POC. Add dual tasking       Outcome Measures Initial Eval  2025 PN  3/5/25       5xSTS 15.3 sec 13.85 sec       TUG  - Regular  - Cognitive  - Carry   9.1 sec  9.6 sec  9.4 sec   7.69 sec  8.72 sec  8.59 sec       MiniBEST /28        10 meter 1.28 m/s 1.23 m/s       6MWT 1535 ft 1655.8 ft       ABC 68.75% 69.38%       PDQ-39 /100        MDS-UPDRS  Part III   /128        H&Y Stage         Floor > Stand  sec 29.56 sec       60 sec STS  3/24: 17 reps                                Date 4/9    3/31/25 4/7/25   Visit # 14    12 13            NMR     30' 30'   Neuro Boxing Warm up FWD step and reach  LAT step and reach  STS        Shadow boxing     1+2,3+4,5+6 20x each, emphasis on alternating UE, inc power    Circuit 1 FWD hurdles (9\") > combos    LAT hurdles (9\") > combos    (2-3 min ea, 2 rounds)    STS w/ harness on, resist at hips provided  Alt LE taps on 8\" step w/ combos  Fwd/retro walk w/ punches  Sidestep w/ punches in " "harness, blazepods used, shuffle between 3x2' 30\" breaks Sideshuffle in harness  Fwd/retro walk  Alt lunges    3x1' each   Circuit 2 Sidestepping on foam w/ card shuffle    Step taps to foam w/ card shuffle    (2 min ea, 1 round)    Sissel seat-  Seated cervical rotation, upper trunk rotation, alt LAQ, alt hip flexion. Sit to stand  Lateral stepping alt LE  3x1' each   STS         Standing Marching         Step up      Fwd/lateral in solostep 6\" step alt LE 2x1' each            TE     8' 10'   Patient education     Reviewed again scapular depression, rationale for standing with back to wall Reviewed pt concerns re: how much time each day should be spent exercising   Scapular mobs/stretches         Physioball rollouts         LE stretching      HS stretch 2x1' stretches, cues for form   TA         Bed mobility         Transfers         HEP                      Access Code: [email or web address]  URL: [email or web address]  Date: 03/19/2025  Prepared by: Reji Sandoval Jr.    Exercises  - Sit to Stand  - 1 x daily - 7 x weekly - 3 sets - 10 reps  - Step Up  - 1 x daily - 7 x weekly - 3 sets - 10 reps  - Lateral Step Up  - 1 x daily - 7 x weekly - 3 sets - 10 reps  - Standing March with Counter Support  - 1 x daily - 7 x weekly - 3 sets - 10 reps  - Side Stepping with Counter Support  - 1 x daily - 7 x weekly - 3 sets - 10 reps           "

## 2025-04-09 NOTE — PROGRESS NOTES
Daily Note     Today's date: 2025  Patient name: Femi Acosta  : 1950  MRN: [id number]  Referring provider: Apolinar Carr MD  Dx:   Encounter Diagnosis   Name Primary?    Parkinson's disease without fluctuating manifestations, unspecified whether dyskinesia present (HCC) Yes           Start Time: 930  Stop Time: 1015  Total time in clinic (min): 45 minutes    PLAN OF CARE START: 3/10/2025  PLAN OF CARE END: 6/10/2025  FREQUENCY: 2-3x/week  Precautions: FALL SAFETY,     Subjective: Pts wife reports she is concerned about his posture when he sits and performs tabletop tasks      Objective: See treatment below.      TA  - home map locations worksheet completed to work on spatial relations,  skills, direction following, orientation    NM:   - placing as many pegs into board in 30 sec to work on FMC, dexterity, and speed of movements    - R first trial: 6 second trial: 7 third trial: 7   - L: first trial: 7 second trial: 9 third trial: 8      Assessment: Tolerated treatment well. He verbalized  that he enjoyed the map locations worksheet as this reminded him of his work as an , answering 90% accurately. Pt demonstrating slow processing speed, bradykinesia, decreased insight into cognitive deficits.  Pt would benefit from continued OT services to address functional cognition, endurance, LE strength, FMC and dexterity.      Plan: Continued skilled OT per POC.

## 2025-04-14 ENCOUNTER — OFFICE VISIT (OUTPATIENT)
Facility: CLINIC | Age: 75
End: 2025-04-14
Payer: MEDICARE

## 2025-04-14 ENCOUNTER — EVALUATION (OUTPATIENT)
Facility: CLINIC | Age: 75
End: 2025-04-14
Payer: MEDICARE

## 2025-04-14 DIAGNOSIS — G20.A1 PARKINSON'S DISEASE, UNSPECIFIED WHETHER DYSKINESIA PRESENT, UNSPECIFIED WHETHER MANIFESTATIONS FLUCTUATE (HCC): Primary | ICD-10-CM

## 2025-04-14 DIAGNOSIS — G20.A1 PARKINSON'S DISEASE WITHOUT FLUCTUATING MANIFESTATIONS, UNSPECIFIED WHETHER DYSKINESIA PRESENT (HCC): Primary | ICD-10-CM

## 2025-04-14 PROCEDURE — 97110 THERAPEUTIC EXERCISES: CPT

## 2025-04-14 PROCEDURE — 97112 NEUROMUSCULAR REEDUCATION: CPT

## 2025-04-14 PROCEDURE — 97530 THERAPEUTIC ACTIVITIES: CPT

## 2025-04-14 NOTE — PROGRESS NOTES
"OCCUPATIONAL THERAPY RE-EVALUATION    Today's Date: 2025  Patient Name: Femi Acosta  : 1950  MRN: [id number]  Referring Provider: Apolinar Carr MD  Dx: Parkinson's disease without fluctuating manifestations, unspecified whether dyskinesia present (HCC) [G20.A1]    Active Problem List: There is no problem list on file for this patient.    Past Medical Hx:   Past Medical History:   Diagnosis Date    Allergic rhinitis     Constipation     GERD (gastroesophageal reflux disease)     Hyperlipidemia     Overactive bladder     Parkinson disease (HCC)     Prostate enlargement      Past Surgical Hx:   Past Surgical History:   Procedure Laterality Date    ANKLE FRACTURE SURGERY      CYST REMOVAL             SKILLED ANALYSIS:  Pt presents to OP OT re-evaluation after about 4 months of services in the setting of PD, recently transferring to this clinic from [place] in 2025.  As per Pt report, he has no change in his functional status. MVPT-4 was added an as assessment today to evaluate visual perceptual skills. Pt is scoring in the 14%ile based on age related norms. He is indicating significant improvement in TM A time, although he was unable to complete TM B today as he was unable to follow instructions provided. Other assessments were not re-evaluated on this date due to time contraints from adding the MVPT-4. He has met 2 goals this reporting period. Pt continues to demonstrate significant deficits in the following domains: overall functional cognition, sustained attn, divided attn, EF, , immediate and delayed visual recall, endurance, activity tolerance.  Recommend continued participation in OP OT services 2-3x/wk for another 8-12 weeks to maximize functioning and independence with daily activities.  Discussed results and recommendations with pt and his wife, both are in agreement.    Subjective   Pt reports \"my wife just really pushes me to do more exercising at home, I don't know if " "I can do it all.\"  3/10: \"I am doing good at home, I'm going on more walks because its getting nicer out now.\"  2/3: \"It shocks me how slow it takes me sometimes to figure these things out during sessions\"      Occupational Profile:   Pt is a 74 year old retired . He enjoys to work outside and garden. Currently the pt having difficulty with writing, putting on clothing, working in his garden, performing lawn work and computer/cell phone usage. Over the past year or two the pt also states he has had more difficulty maintaining a conversation, as he loses his train of thought easily.    PATIENT GOAL: Improve writing, endurance, cognition, engage in RSB program.     HISTORY OF PRESENT ILLNESS:     Pt is a 74 y.o. male who was referred to Occupational Therapy s/p  Parkinson's disease without fluctuating manifestations, unspecified whether dyskinesia present (HCC) [G20.A1].     PMH:   Past Medical History:   Diagnosis Date    Allergic rhinitis     Constipation     GERD (gastroesophageal reflux disease)     Hyperlipidemia     Overactive bladder     Parkinson disease (HCC)     Prostate enlargement        Past Surgical Hx:   Past Surgical History:   Procedure Laterality Date    ANKLE FRACTURE SURGERY  1975    CYST REMOVAL  1972     PLAN OF CARE START: 3/10/2025  PLAN OF CARE END: 6/10/2025  FREQUENCY: 2-3x/week  Precautions: FALL SAFETY,     OBJECTIVE     Motor-Free Visual Perception Test (4th Edition):  Is an individually administered assessment of visual-perceptual skills commonly used in everyday activities.  Raw Score: 26  Standard Score: 84  Percentile Rank: 14%ile  Results: Indicating decreased  skills      9 HOLE PEG TEST: performed 9 hole peg test to assess dexterity/fine motor coordination with pt scoring 1 min 51 sec (prior 108 second) on R hand  and 1 min 23 sec (prior 90 seconds) on L hand  side. Pt demonstrating decreased FMC related to age-related norms   NT 4/14    Trail Making Test A: 2 min 10 sec " "(3 min 13 seconds IND) (prior: 3 min 53 sec with 4 cues for problem solving) IMPROVED  Trail Making Test B: Unable to complete due to inability to follow instructions (prior: 4 min 27 sec to complete through 4 with 6 cues) DECLINED  Norms: A: 40.13 seconds, B: 86.27 seconds.      Scores imply severe deficits with sustained and divided attn, EF, working memory.    Contextual Memory Test: NT    Immediate:  (prior: )    Delayed: , 1 confabulation (prior: )     Matthew Cognitive Assessment Version 8.1 (MoCA V8.1)  Visuospatial/executive functioning:  3/5  Namin/3  Memory: 1st trial:  , 2nd trial:    Attention/concentration:   List of letters:   Seial Seven Subtraction:  3/3   Language/sentence repetition:    Language Fluency:  0/1 (n=8)  Abstract/Correlational Thinkin/2  Delayed Recall:    Orientation:                Memory Index Score: 11/15  MoCA V1 8.3 Raw Score:  20/30, MIS:  11/15, indicative of MILD neurocognitive impairments. NT     MoCA Scoring        Normal: 26+         Mild Cognitive Impairment: 18-25          Moderate Cognitive Impairment: 10-17         Severe Cognitive Impairment: <10         NT                                 Physical Performance Test             Time       Score   Write sentence 18   2   Simulated  eating 21.21 1    Lift book 3.7 (blue binder) 3     Jacket   32 1                                            Violetta 4.5 2                                        Turn 360 2 0                                    50 foot walk  16.2 3      9 point scale:   - 32-36= no impairment  - 25-32= mild   - 17-24 moderate  - < 17 = unable to function in community.  7 point scale:  - < 19.4 mild  - 32-36 not frail.        UE Strength:              MALCOM: RUE\" 72lb (previously 92 lbs) LUE: 74lb (previously 95 lbs)  The age norm is approximately 55-65 lbs and indicating normal  strength  Pt is R hand dominant  NT      Range of Motion:  AROM:   BUE " within normal limits     MMT: NT 4/14  R UE: 4+/5 in all pivots      L UE 4+/5 in all pivots      Pt is L hand dominant     GOALS:   Short Term Goals: 4-6 weeks   Pt will increase FMC by 10 seconds bilaterally on 9 hole peg to complete ADLs and IADLs No change  Pt will demonstrate independence with UE strengthening HEP as per patient report Progressing  Pt will increase LE strength to complete 30 second STS with 9 reps for IADLs NT  Pt will increase dual tasking to complete TUG cog by 3-4 math equations in 10 seconds for IADLs NT  Pt will increase PPT to 15 points overall for IADLs NT  Pt will increase delayed recall and recall 3/5 items on MOCA to complete IADLs   Pt will demonstrate improved functional cognition as evidenced by improving score on the MoCA to 21/30 to improve performance during ADLs and IADLs  Pt will increase FMC to 1min 40sec with R and 1min 10sec on the L on 9 hole peg to complete ADLs and IADLs  Pt will decrease time on TM A to 3 mins as evidence of improved working memory, problem solving, and alternating attention for functional cognition Goal met 4/14  Pt will increase PPT to 13 points overall for IADLs    Long Term Goals: 8-12 weeks   Pt will improve functional endurance to engage in daily tasks or yard works without complaints of fatigue. No change  Pt will improve performance with fasteners for clothing management as per patient report. Progressing  Pt will improve UE strength to 5/5 in all planes to improve performance with ADLs/IADLs NT  Pt will increase PPT to 17 points overall for IADLs NT  Pt will increase delayed recall and recall 4/5 items on MOCA to complete IADLs   Pt will demonstrate improved functional cognition as evidenced by improving score on the MoCA to 22/30 to improve performance during ADLs and IADLs  Pt will increase FMC to 1min 35sec with R and 1min 5sec on the L on 9 hole peg to complete ADLs and IADLs  Pt will decrease time on TM A to 2 min 45 sec as evidence of  improved working memory, problem solving, and alternating attention for functional cognition Goal met 4/14  Pt will increase PPT to 14 points overall for IADLs    OTHER PLANNED THERAPY INTERVENTIONS:   Supine, seated, and in stance neuro re-ed  FMC/prehension  Timed Trials  Hand to target  Seated functional reach: crossing midline  Closed chain activities  Open chain activities  Internal and external memory aides  Multimatrix for saccades/ visual clutter/attention  Hypersensitivity strategies education  Multi-modal environment  Sustained/alternating/divided attention  Tracking tube  Oculomotor control:  saccades, con/divergence  Conv./div. Dynamic tasks  Work stations with timed transitions  Temporal Awareness  Memory and mental manipulation  Auditory processing with immediate recall  Memory retention with immediate and delayed recall  Edu on cog/vision apps

## 2025-04-14 NOTE — PROGRESS NOTES
"Daily Note     Today's date: 2025  Patient name: Femi Acosta  : 1950  MRN: [id number]  Referring provider: Apolinar Carr MD  Dx:   Encounter Diagnosis     ICD-10-CM    1. Parkinson's disease, unspecified whether dyskinesia present, unspecified whether manifestations fluctuate ([facility])  G20.A1           Start Time: 1015  Stop Time: 1100  Total time in clinic (min): 45 minutes    Subjective: Patient arrives reporting no new complaints. Patient noted he did 1 hour of exercise each day, and was watching the [employer] golf tournament.      Objective: See treatment diary below      Assessment: Tolerated treatment well and with pt expressing concerns about his legs getting too fatigued after one hour of exercise. This led to an extended discussion of training endurance, with written information regarding suggestions on how to improve endurance. Discussed this with his wife as well. Pt also acknowledged that he has been having some difficulties with memory \"blackouts\" where he just won't remember things . Patient demonstrated fatigue post treatment, exhibited good technique with therapeutic exercises, and would benefit from continued PT      Plan: Continue per plan of care.  Progress treatment as tolerated.         Outcome Measures Initial Eval  2025 PN  3/5/25 PN  25      5xSTS 15.3 sec 13.85 sec 11.09 sec      TUG  - Regular  - Cognitive  - Carry   9.1 sec  9.6 sec  9.4 sec   7.69 sec  8.72 sec  8.59 sec   7.62 sec  7.07 sec  7.35 sec      MiniBEST /28        10 meter 1.28 m/s 1.23 m/s 1.41 m/s      6MWT 1535 ft 1655.8 ft 1643 ft      ABC 68.75% 69.38% 67.5%      PDQ-39 /100        MDS-UPDRS  Part III   /128        H&Y Stage         Floor > Stand  sec 29.56 sec 12.97 sec                      Date 25       Visit # 15 16                NMR  25'       Neuro Boxing Warm up FWD step and reach  LAT step and reach  STS Fwd step & reach  Lateral step & reach       Shadow boxing  With STS w/ resist at " "hip sets of 10 w/ each stand, varied 1+2, 3+4, 5+6    Performed 2x       Circuit 1 FWD hurdles (9\") > combos    LAT hurdles (9\") > combos    (2-3 min ea, 2 rounds)          Circuit 2 Sidestepping on foam w/ card shuffle    Step taps to foam w/ card shuffle    (2 min ea, 1 round) Sideshuffle w/ punches at end x2'  Fwd/retro jog w/ punches at end 2'  Sit to stand 1' x2 each         STS         Standing Marching         Step up                  TE  15'       Patient education  Reviewed activities to improve endurance, with extended time spent educating patient, reviewing information and also discussing with his wife to ensure understanding.       Scapular mobs/stretches         Physioball rollouts         LE stretching         TA         Bed mobility         Transfers         HEP                      Access Code: [email or web address]  URL: [email or web address]  Date: 03/19/2025  Prepared by: Reji Sandoval Jr.    Exercises  - Sit to Stand  - 1 x daily - 7 x weekly - 3 sets - 10 reps  - Step Up  - 1 x daily - 7 x weekly - 3 sets - 10 reps  - Lateral Step Up  - 1 x daily - 7 x weekly - 3 sets - 10 reps  - Standing March with Counter Support  - 1 x daily - 7 x weekly - 3 sets - 10 reps  - Side Stepping with Counter Support  - 1 x daily - 7 x weekly - 3 sets - 10 reps             "

## 2025-04-16 ENCOUNTER — OFFICE VISIT (OUTPATIENT)
Facility: CLINIC | Age: 75
End: 2025-04-16
Payer: MEDICARE

## 2025-04-16 DIAGNOSIS — G20.A1 PARKINSON'S DISEASE WITHOUT FLUCTUATING MANIFESTATIONS, UNSPECIFIED WHETHER DYSKINESIA PRESENT (HCC): Primary | ICD-10-CM

## 2025-04-16 DIAGNOSIS — G20.A1 PARKINSON'S DISEASE, UNSPECIFIED WHETHER DYSKINESIA PRESENT, UNSPECIFIED WHETHER MANIFESTATIONS FLUCTUATE (HCC): Primary | ICD-10-CM

## 2025-04-16 PROCEDURE — 97112 NEUROMUSCULAR REEDUCATION: CPT | Performed by: PHYSICAL THERAPIST

## 2025-04-16 PROCEDURE — 97530 THERAPEUTIC ACTIVITIES: CPT

## 2025-04-16 NOTE — PROGRESS NOTES
Daily Note     Today's date: 2025  Patient name: Femi Acosta  : 1950  MRN: [id number]  Referring provider: Apolinar Carr MD  Dx:   Encounter Diagnosis   Name Primary?    Parkinson's disease without fluctuating manifestations, unspecified whether dyskinesia present (HCC) Yes             Start Time: 1530  Stop Time: 1615  Total time in clinic (min): 45 minutes    PLAN OF CARE START: 3/10/2025  PLAN OF CARE END: 6/10/2025  FREQUENCY: 2-3x/week  Precautions: FALL SAFETY,     Subjective: Pt reports that the activity today felt like when he used to work      Objective: See treatment below.      TA  - floor plan sketch worksheet completed to work on  skills, sustained attention, planning/organizing, visuospatial skills, and to tie into Pts job history as an . He required mod cues to begin this however faded to complete with mod I requiring increased time  - yellow therabar bends while naming foods A-Z working on dual tasking, divided attention, language skills      Assessment: Tolerated treatment well. Good spatial awareness skills during floor plan sketch task, benefited from using this activity to work on these skills as he was an  prior to retiring. Pt requested to turn off radio in clinic during dual tasking activity today and attended better Pt demonstrating slow processing speed, bradykinesia, decreased insight into cognitive deficits.  Pt would benefit from continued OT services to address functional cognition, endurance, LE strength, FMC and dexterity.      Plan: Continued skilled OT per POC.

## 2025-04-16 NOTE — PROGRESS NOTES
"Daily Note     Today's date: 2025  Patient name: Femi Acosta  : 1950  MRN: [id number]  Referring provider: Apolinar Carr MD  Dx:   Encounter Diagnosis     ICD-10-CM    1. Parkinson's disease, unspecified whether dyskinesia present, unspecified whether manifestations fluctuate ([facility])  G20.A1                      Subjective: Patient arrives reporting no new complaints      Objective: See treatment diary below      Assessment: Patient tolerated treatment well. When navigating agility ladder patient displays reduced step length and poor step placement as fatigue increases. Verbal and visual cueing provided for appropriate punch combination recall, especially with 3-4 punches. Finished treatment with HS stretch to assist patient with maintaining overall mobility. Patient demonstrated fatigue post treatment, exhibited good technique with therapeutic exercises, and would benefit from continued PT      Plan: Continue per plan of care.  Progress treatment as tolerated.         Outcome Measures Initial Eval  2025 PN  3/5/25 PN  25      5xSTS 15.3 sec 13.85 sec 11.09 sec      TUG  - Regular  - Cognitive  - Carry   9.1 sec  9.6 sec  9.4 sec   7.69 sec  8.72 sec  8.59 sec   7.62 sec  7.07 sec  7.35 sec      MiniBEST /28        10 meter 1.28 m/s 1.23 m/s 1.41 m/s      6MWT 1535 ft 1655.8 ft 1643 ft      ABC 68.75% 69.38% 67.5%      PDQ-39 /100        MDS-UPDRS  Part III   /128        H&Y Stage         Floor > Stand  sec 29.56 sec 12.97 sec                      Date 25      Visit # 15 16 17               NMR  25'       Neuro Boxing Warm up FWD step and reach  LAT step and reach  STS Fwd step & reach  Lateral step & reach FWD step and reach  LAT step and reach  STS      Shadow boxing  With STS w/ resist at hip sets of 10 w/ each stand, varied 1+2, 3+4, 5+6    Performed 2x 1, 2, 3, 4, 5, 6    20x ea      Circuit 1 FWD hurdles (9\") > combos    LAT hurdles (9\") > combos    (2-3 min ea, 2 rounds) "    Agility ladder   - Double foot FWD > combos  - Double foot LAT > combos  - Double foot BWD > burnouts      Circuit 2 Sidestepping on foam w/ card shuffle    Step taps to foam w/ card shuffle    (2 min ea, 1 round) Sideshuffle w/ punches at end x2'  Fwd/retro jog w/ punches at end 2'  Sit to stand 1' x2 each         STS         Standing Marching         Step up                  TE  15'       Patient education  Reviewed activities to improve endurance, with extended time spent educating patient, reviewing information and also discussing with his wife to ensure understanding.       Scapular mobs/stretches         Physioball rollouts         LE stretching   HS stretch: 60 sec, 2x      TA         Bed mobility         Transfers         HEP                      Access Code: [email or web address]  URL: [email or web address]  Date: 03/19/2025  Prepared by: Reji Sandoval Jr.    Exercises  - Sit to Stand  - 1 x daily - 7 x weekly - 3 sets - 10 reps  - Step Up  - 1 x daily - 7 x weekly - 3 sets - 10 reps  - Lateral Step Up  - 1 x daily - 7 x weekly - 3 sets - 10 reps  - Standing March with Counter Support  - 1 x daily - 7 x weekly - 3 sets - 10 reps  - Side Stepping with Counter Support  - 1 x daily - 7 x weekly - 3 sets - 10 reps

## 2025-04-18 ENCOUNTER — PRE-OP CATARACT MEASUREMENTS (OUTPATIENT)
Dept: URBAN - METROPOLITAN AREA CLINIC 6 | Facility: CLINIC | Age: 75
End: 2025-04-18

## 2025-04-18 DIAGNOSIS — H02.831: ICD-10-CM

## 2025-04-18 DIAGNOSIS — H02.834: ICD-10-CM

## 2025-04-18 DIAGNOSIS — H43.812: ICD-10-CM

## 2025-04-18 DIAGNOSIS — H25.813: ICD-10-CM

## 2025-04-18 DIAGNOSIS — H16.223: ICD-10-CM

## 2025-04-18 DIAGNOSIS — H04.123: ICD-10-CM

## 2025-04-18 DIAGNOSIS — H40.012: ICD-10-CM

## 2025-04-18 PROCEDURE — 92012 INTRM OPH EXAM EST PATIENT: CPT

## 2025-04-18 PROCEDURE — 92136 OPHTHALMIC BIOMETRY: CPT | Mod: NC,LT

## 2025-04-18 ASSESSMENT — VISUAL ACUITY
OS_CC: 20/70
OD_CC: 20/25-1
OD_GLARE: 20/400
OS_GLARE: 20/400

## 2025-04-18 ASSESSMENT — TONOMETRY
OD_IOP_MMHG: 12
OS_IOP_MMHG: 14

## 2025-04-18 ASSESSMENT — KERATOMETRY
OS_K2POWER_DIOPTERS: 46.50
OS_AXISANGLE_DEGREES: 50
OD_K1POWER_DIOPTERS: 43.25
OS_K1POWER_DIOPTERS: 43.50
OD_K2POWER_DIOPTERS: 47.00
OD_AXISANGLE_DEGREES: 137
OS_AXISANGLE2_DEGREES: 140
OD_AXISANGLE2_DEGREES: 47

## 2025-04-21 ENCOUNTER — OFFICE VISIT (OUTPATIENT)
Facility: CLINIC | Age: 75
End: 2025-04-21
Payer: MEDICARE

## 2025-04-21 DIAGNOSIS — G20.A1 PARKINSON'S DISEASE WITHOUT FLUCTUATING MANIFESTATIONS, UNSPECIFIED WHETHER DYSKINESIA PRESENT (HCC): Primary | ICD-10-CM

## 2025-04-21 DIAGNOSIS — G20.A1 PARKINSON'S DISEASE, UNSPECIFIED WHETHER DYSKINESIA PRESENT, UNSPECIFIED WHETHER MANIFESTATIONS FLUCTUATE (HCC): Primary | ICD-10-CM

## 2025-04-21 PROCEDURE — 97530 THERAPEUTIC ACTIVITIES: CPT

## 2025-04-21 NOTE — PROGRESS NOTES
Daily Note     Today's date: 2025  Patient name: Femi Acosta  : 1950  MRN: [id number]  Referring provider: Apolinar Carr MD  Dx:   Encounter Diagnosis   Name Primary?    Parkinson's disease without fluctuating manifestations, unspecified whether dyskinesia present (HCC) Yes         Start Time: 930  Stop Time: 1015  Total time in clinic (min): 45 minutes    PLAN OF CARE START: 3/10/2025  PLAN OF CARE END: 6/10/2025  FREQUENCY: 2-3x/week  Precautions: FALL SAFETY,     Subjective: Pt reports he has not worked on gardening with his wife      Objective: See treatment below.      TA  - following 3 step directions activity to work on working memory, following directions, standing balance, pinch strength, and functional reaching. Steps including trunk rotation to obtain squigz, step and reach to place on board, then step together. Required mod>max cues when in multimodal environment, fading to min cues when environment was quiet.       Assessment: Tolerated treatment fair. He had difficulty recalling 3 step directions especially when in a busy environment, however this improved when the environment was quiet. Pt has difficulty with attention today. Pt demonstrating slow processing speed, bradykinesia, decreased insight into cognitive deficits.  Pt would benefit from continued OT services to address functional cognition, endurance, LE strength, FMC and dexterity.      Plan: Continued skilled OT per POC.

## 2025-04-22 NOTE — PROGRESS NOTES
Daily Note     Today's date: 2025  Patient name: Femi Acosta  : 1950  MRN: [id number]  Referring provider: Apolinar Carr MD  Dx:   Encounter Diagnosis     ICD-10-CM    1. Parkinson's disease, unspecified whether dyskinesia present, unspecified whether manifestations fluctuate ([facility])  G20.A1           Start Time: 1017  Stop Time: 1103  Total time in clinic (min): 46 minutes    Subjective: Patient's wife present at start of session, with concerns about how he is getting in and out of bed. Notes this is challenging for both of them, because she gets up at night when he does (many times a night) to uncover him and cover him back up.       Objective: See treatment diary below      Assessment: Tolerated treatment  with extended time of education and task training completed, with patient and wife expressing need for additional training, due to the impact this has on their lives. Patient's wife expressing that she tries to get him to apply more force to tasks, with therapist confirming that this is an important principle that we will work on, but also educating family on this being a cardinal sign of PD, and that his brain doesn't register that information in the same way that she does. Requesting help in learning how to help him sequence this task. Simulation of task performed today with sheet, with pt and wife tasked with video recording task at home, to assist therapist in specifics of challenges seen at home, as he and wife note improved performance of lifting legs onto mat during bed mobility simulation . Patient demonstrated fatigue post treatment, would benefit from continued PT, and with emphasis of treatment with this therapist to remain on bed mobility for the next few weeks, to improve this important task for both of their quality of life.      Plan: Continue per plan of care.  Progress treatment as tolerated.   Continued training in bed mobility and especially force production required.    "    Outcome Measures Initial Eval  2/5/2025 PN  3/5/25 PN  4/2/25      5xSTS 15.3 sec 13.85 sec 11.09 sec      TUG  - Regular  - Cognitive  - Carry   9.1 sec  9.6 sec  9.4 sec   7.69 sec  8.72 sec  8.59 sec   7.62 sec  7.07 sec  7.35 sec      MiniBEST /28        10 meter 1.28 m/s 1.23 m/s 1.41 m/s      6MWT 1535 ft 1655.8 ft 1643 ft      ABC 68.75% 69.38% 67.5%      PDQ-39 /100        MDS-UPDRS  Part III   /128        H&Y Stage         Floor > Stand  sec 29.56 sec 12.97 sec                      Date 4/9 4/14/25 4/16/25 4/21/25     Visit # 15 16 17 18              NMR  25'       Neuro Boxing Warm up FWD step and reach  LAT step and reach  STS Fwd step & reach  Lateral step & reach FWD step and reach  LAT step and reach  STS      Shadow boxing  With STS w/ resist at hip sets of 10 w/ each stand, varied 1+2, 3+4, 5+6    Performed 2x 1, 2, 3, 4, 5, 6    20x ea      Circuit 1 FWD hurdles (9\") > combos    LAT hurdles (9\") > combos    (2-3 min ea, 2 rounds)    Agility ladder   - Double foot FWD > combos  - Double foot LAT > combos  - Double foot BWD > burnouts      Circuit 2 Sidestepping on foam w/ card shuffle    Step taps to foam w/ card shuffle    (2 min ea, 1 round) Sideshuffle w/ punches at end x2'  Fwd/retro jog w/ punches at end 2'  Sit to stand 1' x2 each         STS         Standing Marching         Step up                  TE  15'       Patient education  Reviewed activities to improve endurance, with extended time spent educating patient, reviewing information and also discussing with his wife to ensure understanding.       Scapular mobs/stretches         Physioball rollouts         LE stretching   HS stretch: 60 sec, 2x      TA    43'     Bed mobility    Review, education & strategy on sequencing, emphasis on wt shifting, force production, discussed w/ pt and wife.      Transfers         HEP                      Access Code: [email or web address]  URL: [email or web address]  Date: 03/19/2025  Prepared by: " Reji Sandoval Jr.    Exercises  - Sit to Stand  - 1 x daily - 7 x weekly - 3 sets - 10 reps  - Step Up  - 1 x daily - 7 x weekly - 3 sets - 10 reps  - Lateral Step Up  - 1 x daily - 7 x weekly - 3 sets - 10 reps  - Standing March with Counter Support  - 1 x daily - 7 x weekly - 3 sets - 10 reps  - Side Stepping with Counter Support  - 1 x daily - 7 x weekly - 3 sets - 10 reps

## 2025-04-23 ENCOUNTER — OFFICE VISIT (OUTPATIENT)
Facility: CLINIC | Age: 75
End: 2025-04-23
Payer: MEDICARE

## 2025-04-23 DIAGNOSIS — G20.A1 PARKINSON'S DISEASE WITHOUT FLUCTUATING MANIFESTATIONS, UNSPECIFIED WHETHER DYSKINESIA PRESENT (HCC): Primary | ICD-10-CM

## 2025-04-23 DIAGNOSIS — G20.A1 PARKINSON'S DISEASE, UNSPECIFIED WHETHER DYSKINESIA PRESENT, UNSPECIFIED WHETHER MANIFESTATIONS FLUCTUATE (HCC): Primary | ICD-10-CM

## 2025-04-23 PROCEDURE — 97110 THERAPEUTIC EXERCISES: CPT | Performed by: PHYSICAL THERAPIST

## 2025-04-23 PROCEDURE — 97112 NEUROMUSCULAR REEDUCATION: CPT

## 2025-04-23 PROCEDURE — 97530 THERAPEUTIC ACTIVITIES: CPT | Performed by: PHYSICAL THERAPIST

## 2025-04-23 NOTE — PROGRESS NOTES
Daily Note     Today's date: 2025  Patient name: Femi Acosta  : 1950  MRN: [id number]  Referring provider: Apolinar Carr MD  Dx:   Encounter Diagnosis     ICD-10-CM    1. Parkinson's disease, unspecified whether dyskinesia present, unspecified whether manifestations fluctuate ([facility])  G20.A1                        Subjective: Patient reports he has difficulty getting in and out of bed with his blankets      Objective: See treatment diary below      Assessment: Patient tolerated treatment well. Increased focus on bed mobility with patient completing seated to supine and supine to seated transfers with PT cueing to improve sequencing and positioning. While supine, scooting and rolling also completed with cueing to improve mobility. Core strengthening and mobility completed while supine to promote improved mobility and independent. Patient demonstrated fatigue post treatment, exhibited good technique with therapeutic exercises, and would benefit from continued PT        Plan: Continue per plan of care.  Progress treatment as tolerated.   Continued training in bed mobility and especially force production required.       Outcome Measures Initial Eval  2025 PN  3/5/25 PN  25      5xSTS 15.3 sec 13.85 sec 11.09 sec      TUG  - Regular  - Cognitive  - Carry   9.1 sec  9.6 sec  9.4 sec   7.69 sec  8.72 sec  8.59 sec   7.62 sec  7.07 sec  7.35 sec      MiniBEST /28        10 meter 1.28 m/s 1.23 m/s 1.41 m/s      6MWT 1535 ft 1655.8 ft 1643 ft      ABC 68.75% 69.38% 67.5%      PDQ-39 /100        MDS-UPDRS  Part III   /128        H&Y Stage         Floor > Stand  sec 29.56 sec 12.97 sec                      Date 25    Visit # 15 16 17 18 19             NMR  25'       Neuro Boxing Warm up FWD step and reach  LAT step and reach  STS Fwd step & reach  Lateral step & reach FWD step and reach  LAT step and reach  STS      Shadow boxing  With STS w/ resist at hip sets of 10 w/  "each stand, varied 1+2, 3+4, 5+6    Performed 2x 1, 2, 3, 4, 5, 6    20x ea  1, 2, 3, 4, 5, 6    20x ea    (2 sets)    Circuit 1 FWD hurdles (9\") > combos    LAT hurdles (9\") > combos    (2-3 min ea, 2 rounds)    Agility ladder   - Double foot FWD > combos  - Double foot LAT > combos  - Double foot BWD > burnouts      Circuit 2 Sidestepping on foam w/ card shuffle    Step taps to foam w/ card shuffle    (2 min ea, 1 round) Sideshuffle w/ punches at end x2'  Fwd/retro jog w/ punches at end 2'  Sit to stand 1' x2 each         STS         Standing Marching         Step up                  TE  15'       Patient education  Reviewed activities to improve endurance, with extended time spent educating patient, reviewing information and also discussing with his wife to ensure understanding.       Scapular mobs/stretches         Physioball rollouts         LE stretching   HS stretch: 60 sec, 2x      LTR     10x ea, 5 sec holds    Bridges     10x 2 sets, 5 sec hold    Crunches     10x 5 sec holds             TA    43'     Bed mobility    Review, education & strategy on sequencing, emphasis on wt shifting, force production, discussed w/ pt and wife.  - Supine scooting  - B/L Rolling  - Seated to supine  - Supine to seated    Transfers         HEP                      Access Code: [email or web address]  URL: [email or web address]  Date: 03/19/2025  Prepared by: Reji Sandoval Jr.    Exercises  - Sit to Stand  - 1 x daily - 7 x weekly - 3 sets - 10 reps  - Step Up  - 1 x daily - 7 x weekly - 3 sets - 10 reps  - Lateral Step Up  - 1 x daily - 7 x weekly - 3 sets - 10 reps  - Standing March with Counter Support  - 1 x daily - 7 x weekly - 3 sets - 10 reps  - Side Stepping with Counter Support  - 1 x daily - 7 x weekly - 3 sets - 10 reps               "

## 2025-04-23 NOTE — PROGRESS NOTES
"Daily Note     Today's date: 2025  Patient name: Femi Acosta  : 1950  MRN: [id number]  Referring provider: Apolinar Carr MD  Dx:   Encounter Diagnosis   Name Primary?    Parkinson's disease without fluctuating manifestations, unspecified whether dyskinesia present (HCC) Yes           Start Time: 1015  Stop Time: 1100  Total time in clinic (min): 45 minutes    PLAN OF CARE START: 3/10/2025  PLAN OF CARE END: 6/10/2025  FREQUENCY: 2-3x/week  Precautions: FALL SAFETY,     Subjective: Pt reports \"the stretching in PT really helped me move better\"      Objective: See treatment below.      TA  - multimatrix completed numbers/letters with handwriting component on whiteboard presented on slant board to simulate writing on calendar on the wall at home to improve handwriting, working memory, alternating attention,  skills      Assessment: Tolerated treatment well. Less externally distracted today and when engaging in light conversation with therapist, he was able to return to the task without cues to determine how to complete the activity. About MCC through Pt required mod cues to recall the steps of the activity. Pt demonstrating slow processing speed, bradykinesia, decreased insight into cognitive deficits.  Pt would benefit from continued OT services to address functional cognition, endurance, LE strength, FMC and dexterity.      Plan: Continued skilled OT per POC.      "

## 2025-04-28 ENCOUNTER — APPOINTMENT (OUTPATIENT)
Facility: CLINIC | Age: 75
End: 2025-04-28
Payer: MEDICARE

## 2025-04-29 ENCOUNTER — OFFICE VISIT (OUTPATIENT)
Facility: CLINIC | Age: 75
End: 2025-04-29
Payer: MEDICARE

## 2025-04-29 DIAGNOSIS — G20.A1 PARKINSON'S DISEASE, UNSPECIFIED WHETHER DYSKINESIA PRESENT, UNSPECIFIED WHETHER MANIFESTATIONS FLUCTUATE (HCC): Primary | ICD-10-CM

## 2025-04-29 DIAGNOSIS — G20.A1 PARKINSON'S DISEASE WITHOUT FLUCTUATING MANIFESTATIONS, UNSPECIFIED WHETHER DYSKINESIA PRESENT (HCC): Primary | ICD-10-CM

## 2025-04-29 PROCEDURE — 97530 THERAPEUTIC ACTIVITIES: CPT

## 2025-04-29 NOTE — PROGRESS NOTES
Daily Note     Today's date: 2025  Patient name: Femi Acosta  : 1950  MRN: [id number]  Referring provider: Apolinar Carr MD  Dx:   Encounter Diagnosis   Name Primary?    Parkinson's disease without fluctuating manifestations, unspecified whether dyskinesia present (HCC) Yes             Start Time: 1017  Stop Time: 1110  Total time in clinic (min): 53 minutes    PLAN OF CARE START: 3/10/2025  PLAN OF CARE END: 6/10/2025  FREQUENCY: 2-3x/week  Precautions: FALL SAFETY,     Subjective: Pts wife reports they are going away for the month of May and would like to schedule a re-evaluation for . She requested HEP for Pt while away      Objective: See treatment below.      TA  - 120 number board task completed placing tiles over highlighted numbers, then added handwriting component on slant board to work on writing to simulate writing on calendar on the wall at home. Working on working memory, alternating attention, FMC. Pt required cues about 25% of the time to recall his place on the template. Worked in a quiet environment today      Assessment: Tolerated treatment well. Pt did much better today in a quiet environment in terms of his working memory and alternating attention. Processing speed continues to be very slow however improved when in a quiet environment. Pt demonstrating slow processing speed, bradykinesia, decreased insight into cognitive deficits.  Pt would benefit from continued OT services to address functional cognition, endurance, LE strength, FMC and dexterity.      Plan: Continued skilled OT per POC. Provide HEP next session for while away and plan to complete MoCA next session prior to Pt taking 1 month off from therapy.

## 2025-04-30 ENCOUNTER — OFFICE VISIT (OUTPATIENT)
Facility: CLINIC | Age: 75
End: 2025-04-30
Payer: MEDICARE

## 2025-04-30 DIAGNOSIS — G20.A1 PARKINSON'S DISEASE, UNSPECIFIED WHETHER DYSKINESIA PRESENT, UNSPECIFIED WHETHER MANIFESTATIONS FLUCTUATE (HCC): Primary | ICD-10-CM

## 2025-04-30 DIAGNOSIS — G20.A1 PARKINSON'S DISEASE WITHOUT FLUCTUATING MANIFESTATIONS, UNSPECIFIED WHETHER DYSKINESIA PRESENT (HCC): Primary | ICD-10-CM

## 2025-04-30 PROCEDURE — 97530 THERAPEUTIC ACTIVITIES: CPT | Performed by: PHYSICAL THERAPIST

## 2025-04-30 PROCEDURE — 97530 THERAPEUTIC ACTIVITIES: CPT

## 2025-04-30 NOTE — PROGRESS NOTES
Daily Note     Today's date: 2025  Patient name: Femi Acosta  : 1950  MRN: [id number]  Referring provider: Apolinar Carr MD  Dx:   Encounter Diagnosis     ICD-10-CM    1. Parkinson's disease, unspecified whether dyskinesia present, unspecified whether manifestations fluctuate ([facility])  G20.A1                      Subjective: Patient arrives in a pleasant mood. Wife and pt report they will be going up to [place] to their house there to open it up for about a month with re-eval scheduled for early  upon return.       Objective: See treatment diary below      Assessment: Tolerated treatment well and with improved receptiveness to therapist input regarding varied techniques to complete bed mobliity especially with sheet/blanket negotiation. Patient demosntrating improved sequencing with good response to input regarding trying new techniques . Patient demonstrated fatigue post treatment, exhibited good technique with therapeutic exercises, and would benefit from continued PT      Plan: Continue per plan of care.  Progress treatment as tolerated.   Therapy to be placed on hold x 1 month following tomorrow's session.       Outcome Measures Initial Eval  2025 PN  3/5/25 PN  25      5xSTS 15.3 sec 13.85 sec 11.09 sec      TUG  - Regular  - Cognitive  - Carry   9.1 sec  9.6 sec  9.4 sec   7.69 sec  8.72 sec  8.59 sec   7.62 sec  7.07 sec  7.35 sec      MiniBEST /28        10 meter 1.28 m/s 1.23 m/s 1.41 m/s      6MWT 1535 ft 1655.8 ft 1643 ft      ABC 68.75% 69.38% 67.5%      PDQ-39 /100        MDS-UPDRS  Part III   /128        H&Y Stage         Floor > Stand  sec 29.56 sec 12.97 sec                      Date 25   Visit # 15 16 17 18 19 20            NMR  25'       Neuro Boxing Warm up FWD step and reach  LAT step and reach  STS Fwd step & reach  Lateral step & reach FWD step and reach  LAT step and reach  STS      Shadow boxing  With STS w/ resist at hip sets of  "10 w/ each stand, varied 1+2, 3+4, 5+6    Performed 2x 1, 2, 3, 4, 5, 6    20x ea  1, 2, 3, 4, 5, 6    20x ea    (2 sets)    Circuit 1 FWD hurdles (9\") > combos    LAT hurdles (9\") > combos    (2-3 min ea, 2 rounds)    Agility ladder   - Double foot FWD > combos  - Double foot LAT > combos  - Double foot BWD > burnouts      Circuit 2 Sidestepping on foam w/ card shuffle    Step taps to foam w/ card shuffle    (2 min ea, 1 round) Sideshuffle w/ punches at end x2'  Fwd/retro jog w/ punches at end 2'  Sit to stand 1' x2 each         STS         Standing Marching         Step up                  TE  15'       Patient education  Reviewed activities to improve endurance, with extended time spent educating patient, reviewing information and also discussing with his wife to ensure understanding.       Scapular mobs/stretches         Physioball rollouts         LE stretching   HS stretch: 60 sec, 2x      LTR     10x ea, 5 sec holds    Bridges     10x 2 sets, 5 sec hold    Crunches     10x 5 sec holds             TA    43'  40'   Bed mobility    Review, education & strategy on sequencing, emphasis on wt shifting, force production, discussed w/ pt and wife.  - Supine scooting  - B/L Rolling  - Seated to supine  - Supine to seated Supine to sit to L and R, sit to supine w/ sheet negotiation. Significant time spent on considering areas of concern and how to address them (moving bigger faster when arising due to urgency of needing to use the bathroom, etc)  All performed with 3# wts on ankles, 2# wts on arms for increased challenge/effort required   Transfers         HEP                      Access Code: [email or web address]  URL: [email or web address]  Date: 03/19/2025  Prepared by: Reji Sandoval Jr.    Exercises  - Sit to Stand  - 1 x daily - 7 x weekly - 3 sets - 10 reps  - Step Up  - 1 x daily - 7 x weekly - 3 sets - 10 reps  - Lateral Step Up  - 1 x daily - 7 x weekly - 3 sets - 10 reps  - Standing March with Counter " Support  - 1 x daily - 7 x weekly - 3 sets - 10 reps  - Side Stepping with Counter Support  - 1 x daily - 7 x weekly - 3 sets - 10 reps

## 2025-04-30 NOTE — PROGRESS NOTES
PT Progress Note    Today's date: 2025  Patient name: Femi Acosta  : 1950  MRN: [id number]  Referring provider: Apolinar Carr MD  Dx:   Encounter Diagnosis     ICD-10-CM    1. Parkinson's disease, unspecified whether dyskinesia present, unspecified whether manifestations fluctuate ([facility])  G20.A1                     Assessment  Assessment details: Patient is a 75 y.o. Male who has been attending skilled outpatient PT with complaints of gait instability and balance deficits resulting in falls. Over the last month patient does not report any falls but states that he has trouble with functional mobility like getting in/out of bed independently. Since his most recent progress note patient displays similar scores for all of his objective measures including 5xSTS, TUG/Cog/Carry, 6 MWT, 10 MWT, and ABC scale. While minor improvements or regressions were observed, values were not changed enough to reflect MDC, suggesting he is maintaining his current level of functional strength, endurance, and overall low risk of falls. At this time patient will be taking 1 month break from therapy, plan to complete reassessment when he returns. Due to recent falls and neurodegenerative diagnosis, plan to continue with skilled PT services. Patient is interested in the following program to address noted deficits: [employer]. Per research provided by [employer], patient will benefit from skilled outpatient PT services to improve and maximize hisfunction, to reduce risk for falls and potential injuries associated with falls, reduce healthcare costs via hospitalization, and reduce patient and national healthcare costs. In early stages of Parkinson's Disease, research indicates that intensive physical exercise can improve patient's motor control and assist in slowing the disease progression as a neuroprotective agent. Patient will benefit from skilled outpatient PT in order to maximize function in the short-term and long-term  with overall improved postural strength with associated stability and mobility.      Impairments: Abnormal gait, Activity intolerance, Impaired balance, Impaired physical strength, Lacks appropriate HEP, Poor posture, Poor body mechanics, Safety issue  Understanding of Dx/Px/POC: Good  Prognosis: Good      Patient verbalized understanding of POC.    Please contact me if you have any questions or recommendations. Thank you for the referral and the opportunity to share in Femi Acosta's care.           Plan  Plan details: miniBEST  Program: [employer] [facility]  Planned modality interventions: Biofeedback, Cryotherapy, TENS, Thermotherapy  Planned therapy interventions: Abdominal trunk stabilization, ADL training, Balance, Balance/WB training, Breathing training, Body mechanics training, Coordination, Functional ROM exercises, Gait training, HEP, Joint Mobilization, Manual Therapy, Saleem taping, Motor coordination training, Neuromuscular re-education, Patient education, Postural training, Strengthening, Stretching, Therapeutic activities, Therapeutic exercises, Therapeutic training, Transfer training, Activity modification, Work reintegration  Frequency: 2x/wk  Duration in weeks: 12  Plan of Care beginning date: 4/2/2025  Plan of Care expiration date: 12 weeks - 6/25/2025  Treatment plan discussed with: Patient         Goals  Short Term Goals (4 weeks):  - Patient will improve TUG score by MDC of 4.8 sec in order to reduce risk of falls and to increase safety with functional mobility- Progressing  - Patient will improve 5 STS by the MDC of 2.3 sec indicating an improvement in strength and decreased challenge with transfers  - Patient will improve 6 MWT by the MDC of 269 ft indicating an improvement in cardiovascular endurance in order to maximize function with functional mobility throughout the community  - Patient will improve MiniBESTest score by MDC of 5.52 points indicating an improvement with dynamic balance  in order to further decrease risk of falls with functional tasks  - Patient will be independent in basic HEP in order to manage condition at home    Long Term Goals (12 weeks):  - Patient will score a low risk for falls 2/4 fall risks measures  - Patient will score age norm values for 1/2 endurance measures  - Patient will be able to ambulate on uneven surfaces with 50% reduction in near falls to increase safety with community mobility  - Patient will be able to perform a floor transfer without physical assistance to assist with fall recovery at home and in the community  - Patient will be independent in comprehensive HEP post discharge from program  - Patient will be able to walk up incline with decreased difficulty and no loss of balance in order to improve functional mobility throughout the community  - Patient will be able to perform sit to stands from softer surfaces such as a couch or bed in order to improvement function with transfers  - Patient will be consistent with program for at least 1 day per week to assist with management of condition and functional independence of their condition        Cut off score   All date taken from [facility] Neuro Section or Rehab Measures      ALONSO Cutoff Scores:  MDC: 5 pts  Falls Risk Cutoff: 40.22/56 DGI Cutoff Scores:  Luke et al 2011, MDC: 2.9 pts  Harika et al 2008, Artie: Score <19/24   MiniBEST Cutoff Scores:  Ambrosio et al 2011, MDC: 5.52 pts  Zaki and Randell 2013, Artie: <19/28 5xSTS Cutoff Scores:  MDC: 2.3 sec  Flaco et al, 2011, Artie: > 16 sec   TUG Cutoff Scores:  Demetrius Garcia al, 2011, MDC: 4.8 sec  Julio Cesar et al, 2011, Fallers: Meds ON: < 12.21 sec, OFF: 15.5 sec 10 Meter Walk Test Cutoff Scores:  Jevon, 2008, MDC: 0.18 m/s  Age Norm Values, Artie: < 1.0 m/s   ABC Cutoff Scores:  Allyson, 2008, MDC: 13 pts  Demetrius Quinn, MDC: 11.12 pts  Increased risk for falls: < 69% 6 Minute Walk Test Cutoff Scores:  Jevon, 2008,  "MDC: 269 ft  Oleg et al, 2002, Norm Data Healthy Adults:  60 - 69 years:   M: 572 m      F: 538 m  70 - 79 years:   M: 527 m      F: 471 m  80 - 89 years:   M: 417 m      F: 392 m   PDQ-39 Cutoff Scores:  Jared, 2004:  MDC: Mobility (12.24), ADL (16.72), Emotional (14.22), Stigma (21.21), Social Support (21.25), Cognition (21.12), Communication (21.04), Bodily Discomfort (24.48)  Sarthak et al, 2007:  Normative Data: Mobility (49.25), ADL (38.94), Emotional (37.92), Stigma (27.54), Social Support (14.78), Cognition (33.03), Communication (27.99), Bodily Discomfort (40.91) Deangelo and Yahr Stages  Stage 0: No S/S  Stage 1: Mild unilateral symptoms  Stage 1.5: Unilateral and axial symptoms  Stage 2: Bilateral symptoms, no balance impairment  Stage 2.5: Mild bilateral symptoms and recovery with pull test  Stage 3: Mild to moderate postural instability, independent  Stage 4: Severe disability, walking or standing unassisted  Stage 5: Bed bound, w/c bound           Subjective Evaluation    History of Present Illness  Mechanism of injury: Patient arrives with wife for evaluation. He was diagnosed with PD approximately 3-4 years ago, they are interested in neuro boxing program. He was recently seen by angelo for PT, was seeing OT in [place] but would like to change all services to [place] location to be closer to home. Patient has had 1 fall in the last month. Difficulties with dual tasking    Update 3/5/25: Patient reports that he was having some dizziness this morning. Also reports he has had at least 1 fall over the last month. Would like to continue with the boxing program.     Update 4/2/25: Patient reports that he is seeing too many doctors which he reports is \"how it goes\" with Parkinson's. He reports fair compliance with his HEP, would like to continue to progress his exercises.     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 He " states that he is exhausted today.   Primary AD: none    Patient goal: reduce risk of falls, improve seated/standing from chairs    Pain  Current pain ratin/10    Social Support  Steps to enter house: 3-4 steps  Stairs in house: 1 flight   Lives in: wife  Lives with: house    Employment status: Retired    Treatments  Previous treatment: PT, OT  Current treatment: OT      Objective       Gait abnormalities: reduced UE swing, anterior trunk lean        Outcome Measures Initial Eval  2025 PN  3/5/25 PN  25 PN  25     5xSTS 15.3 sec 13.85 sec 11.09 sec 12.43 sec     TUG  - Regular  - Cognitive  - Carry   9.1 sec  9.6 sec  9.4 sec   7.69 sec  8.72 sec  8.59 sec   7.62 sec  7.07 sec  7.35 sec   7.25 sec  8.19 sec  8.06 sec     MiniBEST /28        10 meter 1.28 m/s 1.23 m/s 1.41 m/s 1.46 m/s     6MWT 1535 ft 1655.8 ft 1643 ft 1593.2 ft     ABC 68.75% 69.38% 67.5% 70%     PDQ-39 /100        MDS-UPDRS  Part III   /128        H&Y Stage         Floor > Stand  sec 29.56 sec 12.97 sec 11.25 sec                   Precautions:   Past Medical History:   Diagnosis Date    Allergic rhinitis     Constipation     GERD (gastroesophageal reflux disease)     Hyperlipidemia     Overactive bladder     Parkinson disease (HCC)     Prostate enlargement

## 2025-04-30 NOTE — PROGRESS NOTES
Daily Note     Today's date: 2025  Patient name: Femi Acosta  : 1950  MRN: [id number]  Referring provider: Apolinar Carr MD  Dx:   Encounter Diagnosis   Name Primary?    Parkinson's disease without fluctuating manifestations, unspecified whether dyskinesia present (HCC) Yes               Start Time: 1015  Stop Time: 1100  Total time in clinic (min): 45 minutes    PLAN OF CARE START: 3/10/2025  PLAN OF CARE END: 6/10/2025  FREQUENCY: 2-3x/week  Precautions: FALL SAFETY,     Subjective: Pt reports that he would like to find a dance class to be a part of to help him exercise      Objective: See treatment below.    9 HOLE PEG TEST: performed 9 hole peg test to assess dexterity/fine motor coordination with pt scoring 1 min 15 sec (prior: 1 min 51 sec) on R hand  and 1 min 10 sec (prior: 1 min 23 sec) on L hand  side. Pt demonstrating decreased FMC related to age-related norms     Butler Cognitive Assessment Version 8.1 (MoCA V8.1)  Visuospatial/executive functioning:  3/5  Naming:  3/3  Memory: 1st trial:  2/5, 2nd trial:  4/5  Attention/concentration: 2/2  List of letters: 1/1  Seial Seven Subtraction:  2/3   Language/sentence repetition:  1/2  Language Fluency:  0/1 (n=7)  Abstract/Correlational Thinkin/2  Delayed Recall:  0/5  Orientation:  2/6              Memory Index Score: 715  MoCA V1 8.2 Raw Score:  15/30, MIS:  11/15, indicative of MODERATE neurocognitive impairments.    MoCA Scoring        Normal: 26+         Mild Cognitive Impairment: 18-25          Moderate Cognitive Impairment: 10-17         Severe Cognitive Impairment: <10         Access Code: [email or web address]  URL: [email or web address]  Date: 2025  Prepared by: Stacey Kirksaway    Exercises  - Finger Pinch and Pull with Putty  - 1 x daily - 7 x weekly - 3 sets - 10 reps  - Tip Pinch with Putty  - 1 x daily - 7 x weekly - 3 sets - 10 reps  - Removing Marbles from Putty  - 1 x daily - 7 x weekly - 3 sets - 10  reps      Assessment: Tolerated treatment well. 9 hole peg test and MoCA were re-administered today as Pt will not be seen for 1 month due to travel. HEP was reviewed including putty exercises for FMC, hand strength, and hand out provided to Pts wife on apps/website to utilize for cognitive exercises. Pt demonstrating slow processing speed, bradykinesia, decreased insight into cognitive deficits.  Pt would benefit from continued OT services to address functional cognition, endurance, LE strength, FMC and dexterity.      Plan: Continued skilled OT per POC. Pt on vacation for 1 month with scheduled Re-eval at the start of June.

## 2025-06-02 ENCOUNTER — APPOINTMENT (OUTPATIENT)
Facility: CLINIC | Age: 75
End: 2025-06-02
Payer: MEDICARE

## 2025-06-04 ENCOUNTER — EVALUATION (OUTPATIENT)
Facility: CLINIC | Age: 75
End: 2025-06-04
Attending: FAMILY MEDICINE
Payer: MEDICARE

## 2025-06-04 ENCOUNTER — EVALUATION (OUTPATIENT)
Facility: CLINIC | Age: 75
End: 2025-06-04
Payer: MEDICARE

## 2025-06-04 DIAGNOSIS — G20.B1 PARKINSON'S DISEASE WITH DYSKINESIA WITHOUT FLUCTUATING MANIFESTATIONS (HCC): Primary | ICD-10-CM

## 2025-06-04 DIAGNOSIS — G20.A1 PARKINSON'S DISEASE, UNSPECIFIED WHETHER DYSKINESIA PRESENT, UNSPECIFIED WHETHER MANIFESTATIONS FLUCTUATE (HCC): Primary | ICD-10-CM

## 2025-06-04 PROCEDURE — 97530 THERAPEUTIC ACTIVITIES: CPT | Performed by: PHYSICAL THERAPIST

## 2025-06-04 PROCEDURE — 97112 NEUROMUSCULAR REEDUCATION: CPT

## 2025-06-04 NOTE — LETTER
2025    Apolinar Carr MD  [address]  [address]  [facility] [address]    Patient: Femi Acosta   YOB: 1950   Date of Visit: 2025     Encounter Diagnosis     ICD-10-CM    1. Parkinson's disease with dyskinesia without fluctuating manifestations (HCC)  G20.B1           Dear Dr. Apolinar Carr MD:    Thank you for your recent referral of Femi Acosta. Please review the attached evaluation summary from Femi's recent visit.     Please verify that you agree with the plan of care by signing the attached order.     If you have any questions or concerns, please do not hesitate to call.     I sincerely appreciate the opportunity to share in the care of one of your patients and hope to have another opportunity to work with you in the near future.     Sincerely,    DESIREE Coleman, OTR/L  Physical Therapy at [facility] Neuro Occupational Therapist  [place] OT License# [id number]         Referring Provider:     I certify that I have read the below Plan of Care and certify the need for these services furnished under this plan of treatment while under my care.                    Apolinar Carr MD  [address]  [address]  [facility] [address]  Via Fax: [phone]        OCCUPATIONAL THERAPY RE-EVALUATION & Re-Certification    Today's Date: 2025  Patient Name: Femi Acosta  : 1950  MRN: [id number]  Referring Provider: Apolinar Carr MD  Dx: Parkinson's disease with dyskinesia without fluctuating manifestations (HCC) [G20.B1]    Active Problem List: There is no problem list on file for this patient.    Past Medical Hx:   Past Medical History:   Diagnosis Date   • Allergic rhinitis    • Constipation    • GERD (gastroesophageal reflux disease)    • Hyperlipidemia    • Overactive bladder    • Parkinson disease (HCC)    • Prostate enlargement      Past Surgical Hx:   Past Surgical History:   Procedure Laterality Date   • ANKLE FRACTURE SURGERY     • CYST REMOVAL       "       SKILLED ANALYSIS:  Pt presents to OP OT re-evaluation after taking about a month and a half off of therapy due to traveling secondary to PD, previously being seen by this clinic since February 2025. As per report, minimal changes are noted in Pts function. Today, re-evaluation was completed using the MoCA, CMT, and 9 hole peg test. Pt is demonstrating 2 point improvement in overall MoCA score, improvements noted in delayed memory however decline noted in visuospatial/EF skills. He is also demonstrating decline in immediate visual memory on CMT and difficulty with perception of the images shown. 9 hole peg test time has decline BL, with bradykinesia noted throughout testing. Pt continues to demonstrate significant deficits in the following domains: overall functional cognition, sustained attn, divided attn, EF, , immediate and delayed visual recall, endurance, activity tolerance. Goals have been updated to reflect CLOF. Recommend continued participation in OP OT services 2-3x/wk for another 8-12 weeks to maximize functioning and independence with daily activities.  Discussed results and recommendations with pt and his wife, both are in agreement.    Subjective  6/4: Pt reports \"I guess I'm a bit slow\"  4/14 Pt reports \"my wife just really pushes me to do more exercising at home, I don't know if I can do it all.\"  3/10: \"I am doing good at home, I'm going on more walks because its getting nicer out now.\"  2/3: \"It shocks me how slow it takes me sometimes to figure these things out during sessions\"      Occupational Profile:   Pt is a 74 year old retired . He enjoys to work outside and garden. Currently the pt having difficulty with writing, putting on clothing, working in his garden, performing lawn work and computer/cell phone usage. Over the past year or two the pt also states he has had more difficulty maintaining a conversation, as he loses his train of thought easily.    PATIENT GOAL: Improve " writing, endurance, cognition, engage in RSB program.     HISTORY OF PRESENT ILLNESS:     Pt is a 75 y.o. male who was referred to Occupational Therapy s/p  Parkinson's disease with dyskinesia without fluctuating manifestations (HCC) [G20.B1].     PMH:   Past Medical History:   Diagnosis Date   • Allergic rhinitis    • Constipation    • GERD (gastroesophageal reflux disease)    • Hyperlipidemia    • Overactive bladder    • Parkinson disease (HCC)    • Prostate enlargement        Past Surgical Hx:   Past Surgical History:   Procedure Laterality Date   • ANKLE FRACTURE SURGERY     • CYST REMOVAL       PLAN OF CARE START: 2025  PLAN OF CARE END: 2025  FREQUENCY: 2-3x/week  Precautions: FALL SAFETY, decreased insight to deficits, cog decline    OBJECTIVE      NT   Motor-Free Visual Perception Test (4th Edition):  Is an individually administered assessment of visual-perceptual skills commonly used in everyday activities.  Raw Score: 26  Standard Score: 84  Percentile Rank: 14%ile  Results: Indicating decreased  skills      9 HOLE PEG TEST: performed 9 hole peg test to assess dexterity/fine motor coordination with pt scoring:  R: 1 min 32 sec (prior: 1 min 15 sec) DECLINE  L: 1 min 22 sec (prior: 1 min 10 sec) DECLINE  Pt demonstrating decreased FMC related to age-related norms     NT   Trail Making Test A: 2 min 10 sec (3 min 13 seconds IND) (prior: 3 min 53 sec with 4 cues for problem solving)  Trail Making Test B: Unable to complete due to inability to follow instructions (prior: 4 min 27 sec to complete through 4 with 6 cues)  Norms: A: 40.13 seconds, B: 86.27 seconds.    Scores imply severe deficits with sustained and divided attn, EF, working memory.      Contextual Memory Test:    Immediate:  (prior:  DECLINE   Delayed: , 1 confabulation NT     [place] Cognitive Assessment Version 8.2 (MoCA V8.2)  Visuospatial/executive functionin/5  Naming:  3/3  Memory: 1st trial:   ", 2nd trial:    Attention/concentration: 2  List of letters:   Seial Seven Subtraction:  2/3   Language/sentence repetition:    Language Fluency:  0 (n=6)  Abstract/Correlational Thinkin/2  Delayed Recall:    Orientation:                Memory Index Score: 11/15  MoCA V1 8.2 Raw Score:  17/30, MIS:  11/15, indicative of MODERATE neurocognitive impairments.    MoCA Scoring        Normal: 26+         Mild Cognitive Impairment: 18-25          Moderate Cognitive Impairment: 10-17         Severe Cognitive Impairment: <10         NT                               Physical Performance Test             Time       Score   Write sentence 18   2   Simulated  eating 21.21 1    Lift book 3.7 (blue binder) 3     Jacket   32 1                                            Violetta 4.5 2                                        Turn 360 2 0                                    50 foot walk  16.2 3      9 point scale:   - 32-36= no impairment  - 25-32= mild   - 17-24 moderate  - < 17 = unable to function in community.  7 point scale:  - < 19.4 mild  - 32-36 not frail.       NT    UE Strength:              MALCOM: RUE\" 72lb (previously 92 lbs) LUE: 74lb (previously 95 lbs)  The age norm is approximately 55-65 lbs and indicating normal  strength  Pt is L hand dominant      Range of Motion:  AROM:   BUE within normal limits     MMT:   R UE: 4+/5 in all pivots      L UE 4+/5 in all pivots      Pt is L hand dominant     GOALS:   Short Term Goals: 4-6 weeks   Pt will increase FMC by 10 seconds bilaterally on 9 hole peg to complete ADLs and IADLs Updated below  Pt will demonstrate independence with UE strengthening HEP as per patient report Progressing - continue goal   Pt will increase LE strength to complete 30 second STS with 9 reps for IADLs Discontinued  Pt will increase dual tasking to complete TUG cog by 3-4 math equations in 10 seconds for IADLs Discontinued  Pt will increase delayed recall and recall " 3/5 items on MOCA to complete IADLs Progressing - continue goal 6/4  Pt will demonstrate improved functional cognition as evidenced by improving score on the MoCA to 21/30 to improve performance during ADLs and IADLs Updated below  Pt will increase FMC to 1min 40sec with R and 1min 10sec on the L on 9 hole peg to complete ADLs and IADLs Updated below  Pt will increase PPT to 13 points overall for IADLs Progressing - continue goal 6/4    Updated STGs 6/4:  Pt will demonstrate improved functional cognition as evidenced by improving score on the MoCA to 18/30 to improve performance during ADLs and IADLs  Pt will increase FMC to 1min 15sec on the L on 9 hole peg to complete ADLs and IADLs  Pt will improve MVPT raw score to 28 as evidence of improved  skills for ADLs, IADLs        Long Term Goals: 8-12 weeks   Pt will improve functional endurance to engage in daily tasks or yard works without complaints of fatigue. Progressing - continue goal 6/4  Pt will improve performance with fasteners for clothing management as per patient report. Progressing - continue goal 6/4  Pt will improve UE strength to 5/5 in all planes to improve performance with ADLs/IADLs discontinue goal  Pt will increase PPT to 17 points overall for IADLs Updated below  Pt will increase delayed recall and recall 4/5 items on MOCA to complete IADLs Updated below  Pt will demonstrate improved functional cognition as evidenced by improving score on the MoCA to 22/30 to improve performance during ADLs and IADLs Updated below  Pt will increase FMC to 1min 35sec with R and 1min 5sec on the L on 9 hole peg to complete ADLs and IADLs  Updated below  Pt will increase PPT to 14 points overall for IADLs Progressing - continue goal 6/4    Updated LTGs 6/4  Pt will demonstrate improved functional cognition as evidenced by improving score on the MoCA to 19/30 to improve performance during ADLs and IADLs  Pt will increase FMC to 1min on the L on 9 hole peg to  complete ADLs and IADLs  Pt will increase delayed recall and recall 3/5 items on MOCA to complete IADLs  Pt will increase PPT to 14 points overall for IADLs  Pt will improve MVPT raw score to 32 as evidence of improved  skills for ADLs, IADLs    OTHER PLANNED THERAPY INTERVENTIONS:   Supine, seated, and in stance neuro re-ed  FMC/prehension  Timed Trials  Hand to target  Seated functional reach: crossing midline  Closed chain activities  Open chain activities  Internal and external memory aides  Multimatrix for saccades/ visual clutter/attention  Hypersensitivity strategies education  Multi-modal environment  Sustained/alternating/divided attention  Tracking tube  Oculomotor control:  saccades, con/divergence  Conv./div. Dynamic tasks  Work stations with timed transitions  Temporal Awareness  Memory and mental manipulation  Auditory processing with immediate recall  Memory retention with immediate and delayed recall  Edu on cog/vision apps

## 2025-06-04 NOTE — LETTER
2025    Apolinar Carr MD  [address]  [address]  [facility] [address]    Patient: Femi Acosta   YOB: 1950   Date of Visit: 2025     Encounter Diagnosis     ICD-10-CM    1. Parkinson's disease, unspecified whether dyskinesia present, unspecified whether manifestations fluctuate (HCC)  G20.A1           Dear Dr. Apolinar Carr MD:    Thank you for your recent referral of Femi Acosta. Please review the attached evaluation summary from Femi's recent visit.     Please verify that you agree with the plan of care by signing the attached order.     If you have any questions or concerns, please do not hesitate to call.     I sincerely appreciate the opportunity to share in the care of one of your patients and hope to have another opportunity to work with you in the near future.       Sincerely,    Reji Sandoval Jr, PT      Referring Provider:      I certify that I have read the below Plan of Care and certify the need for these services furnished under this plan of treatment while under my care.                    Apolinar Carr MD  [address]  [address]  [facility] [address]  Via Fax: [phone]          PT Progress Note    Today's date: 2025  Patient name: Femi Acosta  : 1950  MRN: [id number]  Referring provider: Apolinar Carr MD  Dx:   Encounter Diagnosis     ICD-10-CM    1. Parkinson's disease, unspecified whether dyskinesia present, unspecified whether manifestations fluctuate (HCC)  G20.A1                       Assessment  Assessment details: Patient is a 75 y.o. Male who has been attending skilled outpatient PT with complaints of gait instability and balance deficits resulting in falls. Patient had recently taken approximately 1 month off of treatment due to traveling and is back to restart this therapy. Since his most recent progress note patient maintains similar scores for the majority of his objective measures including 5xSTS, TUG/Cog/Carry, 6 MWT, 10 MWT, and ABC  scale. While minor regressions observed with his objective measures, differences in scores are not enough to reflect MDC suggesting he is maintaining the same level of functional LE strength and endurance, gait speed, and balance confidence. During testing patient was most challenged with floor to standing transfer, completing without assistance but requiring significant increase in time to complete. Patient is interested in the following program to address noted deficits: [employer]ing. Per research provided by [employer], patient will benefit from skilled outpatient PT services to improve and maximize hisfunction, to reduce risk for falls and potential injuries associated with falls, reduce healthcare costs via hospitalization, and reduce patient and national healthcare costs. In early stages of Parkinson's Disease, research indicates that intensive physical exercise can improve patient's motor control and assist in slowing the disease progression as a neuroprotective agent. Patient will benefit from skilled outpatient PT in order to maximize function in the short-term and long-term with overall improved postural strength with associated stability and mobility.      Impairments: Abnormal gait, Activity intolerance, Impaired balance, Impaired physical strength, Lacks appropriate HEP, Poor posture, Poor body mechanics, Safety issue  Understanding of Dx/Px/POC: Good  Prognosis: Good      Patient verbalized understanding of POC.    Please contact me if you have any questions or recommendations. Thank you for the referral and the opportunity to share in Femi Acosta's care.           Plan  Plan details: miniBEST  Program: [employer] Ste[employer] Boxing  Planned modality interventions: Biofeedback, Cryotherapy, TENS, Thermotherapy  Planned therapy interventions: Abdominal trunk stabilization, ADL training, Balance, Balance/WB training, Breathing training, Body mechanics training, Coordination, Functional ROM exercises, Gait  training, HEP, Joint Mobilization, Manual Therapy, Saleem taping, Motor coordination training, Neuromuscular re-education, Patient education, Postural training, Strengthening, Stretching, Therapeutic activities, Therapeutic exercises, Therapeutic training, Transfer training, Activity modification, Work reintegration  Frequency: 2x/wk  Duration in weeks: 12  Plan of Care beginning date: 6/4/2025  Plan of Care expiration date: 12 weeks - 8/27/2025  Treatment plan discussed with: Patient         Goals  Short Term Goals (4 weeks):  - Patient will improve TUG score by MDC of 4.8 sec in order to reduce risk of falls and to increase safety with functional mobility- Progressing  - Patient will improve 5 STS by the MDC of 2.3 sec indicating an improvement in strength and decreased challenge with transfers  - Patient will improve 6 MWT by the MDC of 269 ft indicating an improvement in cardiovascular endurance in order to maximize function with functional mobility throughout the community  - Patient will improve MiniBESTest score by MDC of 5.52 points indicating an improvement with dynamic balance in order to further decrease risk of falls with functional tasks  - Patient will be independent in basic HEP in order to manage condition at home    Long Term Goals (12 weeks):  - Patient will score a low risk for falls 2/4 fall risks measures  - Patient will score age norm values for 1/2 endurance measures  - Patient will be able to ambulate on uneven surfaces with 50% reduction in near falls to increase safety with community mobility  - Patient will be able to perform a floor transfer without physical assistance to assist with fall recovery at home and in the community  - Patient will be independent in comprehensive HEP post discharge from program  - Patient will be able to walk up incline with decreased difficulty and no loss of balance in order to improve functional mobility throughout the community  - Patient will be able to  perform sit to stands from softer surfaces such as a couch or bed in order to improvement function with transfers  - Patient will be consistent with program for at least 1 day per week to assist with management of condition and functional independence of their condition        Cut off score   All date taken from [facility] Neuro Section or Rehab Measures      ALONSO Cutoff Scores:  MDC: 5 pts  Falls Risk Cutoff: 40.22/56 DGI Cutoff Scores:  Luke et al 2011, MDC: 2.9 pts  Harika et al 2008, Artie: Score <19/24   MiniBEST Cutoff Scores:  Ambrosio et al 2011, MDC: 5.52 pts  Zion 2013, Artie: <19/28 5xSTS Cutoff Scores:  MDC: 2.3 sec  Flaco et al, 2011, Artie: > 16 sec   TUG Cutoff Scores:  Demetrius Rivera et al, 2011, MDC: 4.8 sec  Julio Cesar et al, 2011, Fallers: Meds ON: < 12.21 sec, OFF: 15.5 sec 10 Meter Walk Test Cutoff Scores:  Jevon, 2008, MDC: 0.18 m/s  Age Norm Values, Artie: < 1.0 m/s   ABC Cutoff Scores:  Allyson, 2008, MDC: 13 pts  Demetrius Quinn, MDC: 11.12 pts  Increased risk for falls: < 69% 6 Minute Walk Test Cutoff Scores:  Jevon, 2008, MDC: 269 ft  Oleg et al, 2002, Norm Data Healthy Adults:  60 - 69 years:   M: 572 m      F: 538 m  70 - 79 years:   M: 527 m      F: 471 m  80 - 89 years:   M: 417 m      F: 392 m   PDQ-39 Cutoff Scores:  Abhishek et al, 2004:  MDC: Mobility (12.24), ADL (16.72), Emotional (14.22), Stigma (21.21), Social Support (21.25), Cognition (21.12), Communication (21.04), Bodily Discomfort (24.48)  Sarthak et al, 2007:  Normative Data: Mobility (49.25), ADL (38.94), Emotional (37.92), Stigma (27.54), Social Support (14.78), Cognition (33.03), Communication (27.99), Bodily Discomfort (40.91) Deangelo and Yahr Stages  Stage 0: No S/S  Stage 1: Mild unilateral symptoms  Stage 1.5: Unilateral and axial symptoms  Stage 2: Bilateral symptoms, no balance impairment  Stage 2.5: Mild bilateral symptoms and recovery with pull test  Stage 3: Mild to  "moderate postural instability, independent  Stage 4: Severe disability, walking or standing unassisted  Stage 5: Bed bound, w/c bound           Subjective Evaluation    History of Present Illness  Mechanism of injury: Patient arrives with wife for evaluation. He was diagnosed with PD approximately 3-4 years ago, they are interested in neuro boxing program. He was recently seen by angelo for PT, was seeing OT in [place] but would like to change all services to [place] location to be closer to home. Patient has had 1 fall in the last month. Difficulties with dual tasking    Update 3/5/25: Patient reports that he was having some dizziness this morning. Also reports he has had at least 1 fall over the last month. Would like to continue with the boxing program.     Update 25: Patient reports that he is seeing too many doctors which he reports is \"how it goes\" with Parkinson's. He reports fair compliance with his HEP, would like to continue to progress his exercises.     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 He states that he is exhausted today.   Primary AD: none    Patient goal: reduce risk of falls, improve seated/standing from chairs    Pain  Current pain ratin/10    Social Support  Steps to enter house: 3-4 steps  Stairs in house: 1 flight   Lives in: wife  Lives with: house    Employment status: Retired    Treatments  Previous treatment: PT, OT  Current treatment: OT      Objective       Gait abnormalities: reduced UE swing, anterior trunk lean        Outcome Measures Initial Eval  2025 PN  3/5/25 PN  25 PN  25 PN  25    5xSTS 15.3 sec 13.85 sec 11.09 sec 12.43 sec 13.5 sec    TUG  - Regular  - Cognitive  - Carry   9.1 sec  9.6 sec  9.4 sec   7.69 sec  8.72 sec  8.59 sec   7.62 sec  7.07 sec  7.35 sec   7.25 sec  8.19 sec  8.06 sec   7.57 sec  8.75 sec  8.59 sec    MiniBEST /28        10 meter 1.28 m/s 1.23 m/s " 1.41 m/s 1.46 m/s 1.37 m/s    6MWT 1535 ft 1655.8 ft 1643 ft 1593.2 ft 1541 ft    ABC 68.75% 69.38% 67.5% 70% 65.63%    PDQ-39 /100        MDS-UPDRS  Part III   /128        H&Y Stage         Floor > Stand  sec 29.56 sec 12.97 sec 11.25 sec 37.09 sec                  Precautions:   Past Medical History:   Diagnosis Date   • Allergic rhinitis    • Constipation    • GERD (gastroesophageal reflux disease)    • Hyperlipidemia    • Overactive bladder    • Parkinson disease (HCC)    • Prostate enlargement

## 2025-06-04 NOTE — PROGRESS NOTES
"OCCUPATIONAL THERAPY RE-EVALUATION & Re-Certification    Today's Date: 2025  Patient Name: Femi Acosta  : 1950  MRN: [id number]  Referring Provider: Apolinar Carr MD  Dx: Parkinson's disease with dyskinesia without fluctuating manifestations (HCC) [G20.B1]    Active Problem List: There is no problem list on file for this patient.    Past Medical Hx:   Past Medical History:   Diagnosis Date    Allergic rhinitis     Constipation     GERD (gastroesophageal reflux disease)     Hyperlipidemia     Overactive bladder     Parkinson disease (HCC)     Prostate enlargement      Past Surgical Hx:   Past Surgical History:   Procedure Laterality Date    ANKLE FRACTURE SURGERY      CYST REMOVAL             SKILLED ANALYSIS:  Pt presents to OP OT re-evaluation after taking about a month and a half off of therapy due to traveling secondary to PD, previously being seen by this clinic since 2025. As per report, minimal changes are noted in Pts function. Today, re-evaluation was completed using the MoCA, CMT, and 9 hole peg test. Pt is demonstrating 2 point improvement in overall MoCA score, improvements noted in delayed memory however decline noted in visuospatial/EF skills. He is also demonstrating decline in immediate visual memory on CMT and difficulty with perception of the images shown. 9 hole peg test time has decline BL, with bradykinesia noted throughout testing. Pt continues to demonstrate significant deficits in the following domains: overall functional cognition, sustained attn, divided attn, EF, , immediate and delayed visual recall, endurance, activity tolerance. Goals have been updated to reflect CLOF. Recommend continued participation in OP OT services 2-3x/wk for another 8-12 weeks to maximize functioning and independence with daily activities.  Discussed results and recommendations with pt and his wife, both are in agreement.    Subjective  : Pt reports \"I guess I'm a bit " "slow\"  4/14 Pt reports \"my wife just really pushes me to do more exercising at home, I don't know if I can do it all.\"  3/10: \"I am doing good at home, I'm going on more walks because its getting nicer out now.\"  2/3: \"It shocks me how slow it takes me sometimes to figure these things out during sessions\"      Occupational Profile:   Pt is a 74 year old retired . He enjoys to work outside and garden. Currently the pt having difficulty with writing, putting on clothing, working in his garden, performing lawn work and computer/cell phone usage. Over the past year or two the pt also states he has had more difficulty maintaining a conversation, as he loses his train of thought easily.    PATIENT GOAL: Improve writing, endurance, cognition, engage in RSB program.     HISTORY OF PRESENT ILLNESS:     Pt is a 75 y.o. male who was referred to Occupational Therapy s/p  Parkinson's disease with dyskinesia without fluctuating manifestations (HCC) [G20.B1].     PMH:   Past Medical History:   Diagnosis Date    Allergic rhinitis     Constipation     GERD (gastroesophageal reflux disease)     Hyperlipidemia     Overactive bladder     Parkinson disease (HCC)     Prostate enlargement        Past Surgical Hx:   Past Surgical History:   Procedure Laterality Date    ANKLE FRACTURE SURGERY  1975    CYST REMOVAL  1972     PLAN OF CARE START: 6/4/2025  PLAN OF CARE END: 9/4/2025  FREQUENCY: 2-3x/week  Precautions: FALL SAFETY, decreased insight to deficits, cog decline    OBJECTIVE      NT 6/4  Motor-Free Visual Perception Test (4th Edition):  Is an individually administered assessment of visual-perceptual skills commonly used in everyday activities.  Raw Score: 26  Standard Score: 84  Percentile Rank: 14%ile  Results: Indicating decreased  skills      9 HOLE PEG TEST: performed 9 hole peg test to assess dexterity/fine motor coordination with pt scoring:  R: 1 min 32 sec (prior: 1 min 15 sec) DECLINE  L: 1 min 22 sec (prior: 1 " "min 10 sec) DECLINE  Pt demonstrating decreased FMC related to age-related norms     NT   Trail Making Test A: 2 min 10 sec (3 min 13 seconds IND) (prior: 3 min 53 sec with 4 cues for problem solving)  Trail Making Test B: Unable to complete due to inability to follow instructions (prior: 4 min 27 sec to complete through 4 with 6 cues)  Norms: A: 40.13 seconds, B: 86.27 seconds.    Scores imply severe deficits with sustained and divided attn, EF, working memory.      Contextual Memory Test:    Immediate:  (prior:  DECLINE   Delayed: , 1 confabulation NT     [place] Cognitive Assessment Version 8.2 (MoCA V8.2)  Visuospatial/executive functionin/5  Naming:  3/3  Memory: 1st trial:  , 2nd trial:    Attention/concentration:   List of letters:   Seial Seven Subtraction:  2/3   Language/sentence repetition:    Language Fluency:  0/1 (n=6)  Abstract/Correlational Thinkin/2  Delayed Recall:    Orientation:                Memory Index Score: 11/15  MoCA V1 8.2 Raw Score:  17/30, MIS:  11/15, indicative of MODERATE neurocognitive impairments.    MoCA Scoring        Normal: 26+         Mild Cognitive Impairment: 18-25          Moderate Cognitive Impairment: 10-17         Severe Cognitive Impairment: <10         NT                               Physical Performance Test             Time       Score   Write sentence 18   2   Simulated  eating 21.21 1    Lift book 3.7 (blue binder) 3     Jacket   32 1                                            Violetta 4.5 2                                        Turn 360 2 0                                    50 foot walk  16.2 3      9 point scale:   - 32-36= no impairment  - 25-32= mild   - 17-24 moderate  - < 17 = unable to function in community.  7 point scale:  - < 19.4 mild  - 32-36 not frail.       NT    UE Strength:              MALCOM: RUE\" 72lb (previously 92 lbs) LUE: 74lb (previously 95 lbs)  The age norm is approximately 55-65 lbs " and indicating normal  strength  Pt is L hand dominant      Range of Motion:  AROM:   BUE within normal limits     MMT:   R UE: 4+/5 in all pivots      L UE 4+/5 in all pivots      Pt is L hand dominant     GOALS:   Short Term Goals: 4-6 weeks   Pt will increase FMC by 10 seconds bilaterally on 9 hole peg to complete ADLs and IADLs Updated below  Pt will demonstrate independence with UE strengthening HEP as per patient report Progressing - continue goal 6/4  Pt will increase LE strength to complete 30 second STS with 9 reps for IADLs Discontinued  Pt will increase dual tasking to complete TUG cog by 3-4 math equations in 10 seconds for IADLs Discontinued  Pt will increase delayed recall and recall 3/5 items on MOCA to complete IADLs Progressing - continue goal 6/4  Pt will demonstrate improved functional cognition as evidenced by improving score on the MoCA to 21/30 to improve performance during ADLs and IADLs Updated below  Pt will increase FMC to 1min 40sec with R and 1min 10sec on the L on 9 hole peg to complete ADLs and IADLs Updated below  Pt will increase PPT to 13 points overall for IADLs Progressing - continue goal 6/4    Updated STGs 6/4:  Pt will demonstrate improved functional cognition as evidenced by improving score on the MoCA to 18/30 to improve performance during ADLs and IADLs  Pt will increase FMC to 1min 15sec on the L on 9 hole peg to complete ADLs and IADLs  Pt will improve MVPT raw score to 28 as evidence of improved  skills for ADLs, IADLs        Long Term Goals: 8-12 weeks   Pt will improve functional endurance to engage in daily tasks or yard works without complaints of fatigue. Progressing - continue goal 6/4  Pt will improve performance with fasteners for clothing management as per patient report. Progressing - continue goal 6/4  Pt will improve UE strength to 5/5 in all planes to improve performance with ADLs/IADLs discontinue goal  Pt will increase PPT to 17 points overall for  IADLs Updated below  Pt will increase delayed recall and recall 4/5 items on MOCA to complete IADLs Updated below  Pt will demonstrate improved functional cognition as evidenced by improving score on the MoCA to 22/30 to improve performance during ADLs and IADLs Updated below  Pt will increase FMC to 1min 35sec with R and 1min 5sec on the L on 9 hole peg to complete ADLs and IADLs  Updated below  Pt will increase PPT to 14 points overall for IADLs Progressing - continue goal 6/4    Updated LTGs 6/4  Pt will demonstrate improved functional cognition as evidenced by improving score on the MoCA to 19/30 to improve performance during ADLs and IADLs  Pt will increase FMC to 1min on the L on 9 hole peg to complete ADLs and IADLs  Pt will increase delayed recall and recall 3/5 items on MOCA to complete IADLs  Pt will increase PPT to 14 points overall for IADLs  Pt will improve MVPT raw score to 32 as evidence of improved  skills for ADLs, IADLs    OTHER PLANNED THERAPY INTERVENTIONS:   Supine, seated, and in stance neuro re-ed  FMC/prehension  Timed Trials  Hand to target  Seated functional reach: crossing midline  Closed chain activities  Open chain activities  Internal and external memory aides  Multimatrix for saccades/ visual clutter/attention  Hypersensitivity strategies education  Multi-modal environment  Sustained/alternating/divided attention  Tracking tube  Oculomotor control:  saccades, con/divergence  Conv./div. Dynamic tasks  Work stations with timed transitions  Temporal Awareness  Memory and mental manipulation  Auditory processing with immediate recall  Memory retention with immediate and delayed recall  Edu on cog/vision apps

## 2025-06-04 NOTE — PROGRESS NOTES
PT Progress Note    Today's date: 2025  Patient name: Femi Acosta  : 1950  MRN: [id number]  Referring provider: Apolinar Carr MD  Dx:   Encounter Diagnosis     ICD-10-CM    1. Parkinson's disease, unspecified whether dyskinesia present, unspecified whether manifestations fluctuate ([facility])  G20.A1                       Assessment  Assessment details: Patient is a 75 y.o. Male who has been attending skilled outpatient PT with complaints of gait instability and balance deficits resulting in falls. Patient had recently taken approximately 1 month off of treatment due to traveling and is back to restart this therapy. Since his most recent progress note patient maintains similar scores for the majority of his objective measures including 5xSTS, TUG/Cog/Carry, 6 MWT, 10 MWT, and ABC scale. While minor regressions observed with his objective measures, differences in scores are not enough to reflect MDC suggesting he is maintaining the same level of functional LE strength and endurance, gait speed, and balance confidence. During testing patient was most challenged with floor to standing transfer, completing without assistance but requiring significant increase in time to complete. Patient is interested in the following program to address noted deficits: [employer]ing. Per research provided by [employer], patient will benefit from skilled outpatient PT services to improve and maximize hisfunction, to reduce risk for falls and potential injuries associated with falls, reduce healthcare costs via hospitalization, and reduce patient and national healthcare costs. In early stages of Parkinson's Disease, research indicates that intensive physical exercise can improve patient's motor control and assist in slowing the disease progression as a neuroprotective agent. Patient will benefit from skilled outpatient PT in order to maximize function in the short-term and long-term with overall improved postural strength with  associated stability and mobility.      Impairments: Abnormal gait, Activity intolerance, Impaired balance, Impaired physical strength, Lacks appropriate HEP, Poor posture, Poor body mechanics, Safety issue  Understanding of Dx/Px/POC: Good  Prognosis: Good      Patient verbalized understanding of POC.    Please contact me if you have any questions or recommendations. Thank you for the referral and the opportunity to share in Femi Acosta's care.           Plan  Plan details: miniBEST  Program: [employer] Boxing  Planned modality interventions: Biofeedback, Cryotherapy, TENS, Thermotherapy  Planned therapy interventions: Abdominal trunk stabilization, ADL training, Balance, Balance/WB training, Breathing training, Body mechanics training, Coordination, Functional ROM exercises, Gait training, HEP, Joint Mobilization, Manual Therapy, Saleem taping, Motor coordination training, Neuromuscular re-education, Patient education, Postural training, Strengthening, Stretching, Therapeutic activities, Therapeutic exercises, Therapeutic training, Transfer training, Activity modification, Work reintegration  Frequency: 2x/wk  Duration in weeks: 12  Plan of Care beginning date: 6/4/2025  Plan of Care expiration date: 12 weeks - 8/27/2025  Treatment plan discussed with: Patient         Goals  Short Term Goals (4 weeks):  - Patient will improve TUG score by MDC of 4.8 sec in order to reduce risk of falls and to increase safety with functional mobility- Progressing  - Patient will improve 5 STS by the MDC of 2.3 sec indicating an improvement in strength and decreased challenge with transfers  - Patient will improve 6 MWT by the MDC of 269 ft indicating an improvement in cardiovascular endurance in order to maximize function with functional mobility throughout the community  - Patient will improve MiniBESTest score by MDC of 5.52 points indicating an improvement with dynamic balance in order to further decrease risk of falls  with functional tasks  - Patient will be independent in basic HEP in order to manage condition at home    Long Term Goals (12 weeks):  - Patient will score a low risk for falls 2/4 fall risks measures  - Patient will score age norm values for 1/2 endurance measures  - Patient will be able to ambulate on uneven surfaces with 50% reduction in near falls to increase safety with community mobility  - Patient will be able to perform a floor transfer without physical assistance to assist with fall recovery at home and in the community  - Patient will be independent in comprehensive HEP post discharge from program  - Patient will be able to walk up incline with decreased difficulty and no loss of balance in order to improve functional mobility throughout the community  - Patient will be able to perform sit to stands from softer surfaces such as a couch or bed in order to improvement function with transfers  - Patient will be consistent with program for at least 1 day per week to assist with management of condition and functional independence of their condition        Cut off score   All date taken from [employer] Neuro Section or Rehab Measures      ALONSO Cutoff Scores:  MDC: 5 pts  Falls Risk Cutoff: 40.22/56 DGI Cutoff Scores:  Luke et al 2011, MDC: 2.9 pts  Harika et al 2008, Artie: Score <19/24   MiniBEST Cutoff Scores:  Ambrosio et al 2011, MDC: 5.52 pts  Zaki and Randell 2013, Artie: <19/28 5xSTS Cutoff Scores:  MDC: 2.3 sec  Flaco et al, 2011, Artie: > 16 sec   TUG Cutoff Scores:  Demetrius Rivera et al, 2011, MDC: 4.8 sec  Julio Cesar et al, 2011, Fallers: Meds ON: < 12.21 sec, OFF: 15.5 sec 10 Meter Walk Test Cutoff Scores:  Jevon, 2008, MDC: 0.18 m/s  Age Norm Values, Artie: < 1.0 m/s   ABC Cutoff Scores:  Allyson, 2008, MDC: 13 pts  Demetrius Quinn, MDC: 11.12 pts  Increased risk for falls: < 69% 6 Minute Walk Test Cutoff Scores:  Jevon, 2008, MDC: 269 ft  Oleg et al, 2002, Norm Data  "Healthy Adults:  60 - 69 years:   M: 572 m      F: 538 m  70 - 79 years:   M: 527 m      F: 471 m  80 - 89 years:   M: 417 m      F: 392 m   PDQ-39 Cutoff Scores:  Abhishek et al, 2004:  MDC: Mobility (12.24), ADL (16.72), Emotional (14.22), Stigma (21.21), Social Support (21.25), Cognition (21.12), Communication (21.04), Bodily Discomfort (24.48)  Sarthak et al, 2007:  Normative Data: Mobility (49.25), ADL (38.94), Emotional (37.92), Stigma (27.54), Social Support (14.78), Cognition (33.03), Communication (27.99), Bodily Discomfort (40.91) Deangelo and Yahr Stages  Stage 0: No S/S  Stage 1: Mild unilateral symptoms  Stage 1.5: Unilateral and axial symptoms  Stage 2: Bilateral symptoms, no balance impairment  Stage 2.5: Mild bilateral symptoms and recovery with pull test  Stage 3: Mild to moderate postural instability, independent  Stage 4: Severe disability, walking or standing unassisted  Stage 5: Bed bound, w/c bound           Subjective Evaluation    History of Present Illness  Mechanism of injury: Patient arrives with wife for evaluation. He was diagnosed with PD approximately 3-4 years ago, they are interested in neuro boxing program. He was recently seen by [facility] for PT, was seeing OT in [place] but would like to change all services to [place] location to be closer to home. Patient has had 1 fall in the last month. Difficulties with dual tasking    Update 3/5/25: Patient reports that he was having some dizziness this morning. Also reports he has had at least 1 fall over the last month. Would like to continue with the boxing program.     Update 4/2/25: Patient reports that he is seeing too many doctors which he reports is \"how it goes\" with Parkinson's. He reports fair compliance with his HEP, would like to continue to progress his exercises.     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 He states that he is exhausted today.   Primary " AD: none    Patient goal: reduce risk of falls, improve seated/standing from chairs    Pain  Current pain ratin/10    Social Support  Steps to enter house: 3-4 steps  Stairs in house: 1 flight   Lives in: wife  Lives with: house    Employment status: Retired    Treatments  Previous treatment: PT, OT  Current treatment: OT      Objective       Gait abnormalities: reduced UE swing, anterior trunk lean        Outcome Measures Initial Eval  2025 PN  3/5/25 PN  25 PN  25 PN  25    5xSTS 15.3 sec 13.85 sec 11.09 sec 12.43 sec 13.5 sec    TUG  - Regular  - Cognitive  - Carry   9.1 sec  9.6 sec  9.4 sec   7.69 sec  8.72 sec  8.59 sec   7.62 sec  7.07 sec  7.35 sec   7.25 sec  8.19 sec  8.06 sec   7.57 sec  8.75 sec  8.59 sec    MiniBEST /28        10 meter 1.28 m/s 1.23 m/s 1.41 m/s 1.46 m/s 1.37 m/s    6MWT 1535 ft 1655.8 ft 1643 ft 1593.2 ft 1541 ft    ABC 68.75% 69.38% 67.5% 70% 65.63%    PDQ-39 /100        MDS-UPDRS  Part III   /128        H&Y Stage         Floor > Stand  sec 29.56 sec 12.97 sec 11.25 sec 37.09 sec                  Precautions:   Past Medical History:   Diagnosis Date    Allergic rhinitis     Constipation     GERD (gastroesophageal reflux disease)     Hyperlipidemia     Overactive bladder     Parkinson disease (HCC)     Prostate enlargement

## 2025-06-11 ENCOUNTER — OFFICE VISIT (OUTPATIENT)
Facility: CLINIC | Age: 75
End: 2025-06-11
Payer: MEDICARE

## 2025-06-11 DIAGNOSIS — G20.B1 PARKINSON'S DISEASE WITH DYSKINESIA WITHOUT FLUCTUATING MANIFESTATIONS (HCC): Primary | ICD-10-CM

## 2025-06-11 PROCEDURE — 97530 THERAPEUTIC ACTIVITIES: CPT

## 2025-06-11 PROCEDURE — 97112 NEUROMUSCULAR REEDUCATION: CPT

## 2025-06-11 NOTE — PROGRESS NOTES
"Daily Note     Today's date: 2025  Patient name: Femi Acosta  : 1950  MRN: [id number]  Referring provider: Apolinar Carr MD  Dx:   Encounter Diagnosis   Name Primary?    Parkinson's disease with dyskinesia without fluctuating manifestations (HCC) Yes                 Start Time: 1615  Stop Time: 1700  Total time in clinic (min): 45 minutes    PLAN OF CARE START: 2025  PLAN OF CARE END: 2025  FREQUENCY: 2-3x/week  Precautions: FALL SAFETY, decreased insight to deficits, cog decline    Subjective: Pt reports \"my wife told me I have to do what she says or I will have to go in a nursing home, I am scared that you guys are conspiring with her to put me in a nursing home.\"      Objective: See treatment below.    TA:  - discussion with Pt regarding current functional limitations and implications for therapy to slow the decline, Pt exhibiting fear of being placed in a nursing home this session, consoling provided to Pt that this is up to the decision of his family and re-assured Pt that therapists cannot force him to reside in a nursing home secondary to Pt appearing paranoid about this today    NM:  - /FMC exercise completed with large mushroom pegs with therapist presenting pegs naming coordinates with Pt rotating in hand and placing in correct location. Visual cue of lettered/numbered grid provided.    Assessment: Tolerated treatment well. Pt demonstrating paranoia this session regarding fear of being placed in a nursing home, he was re-assured that therapists cannot make this decision for him, Pt exhibiting less fear/anxiety at the end of the session. Pt demonstrating slow processing speed, bradykinesia, decreased insight into cognitive deficits.  Pt would benefit from continued OT services to address functional cognition, endurance, LE strength, FMC and dexterity.      Plan: Continued skilled OT per POC.      "

## 2025-06-16 ENCOUNTER — OFFICE VISIT (OUTPATIENT)
Facility: CLINIC | Age: 75
End: 2025-06-16
Payer: MEDICARE

## 2025-06-16 ENCOUNTER — OFFICE VISIT (OUTPATIENT)
Facility: CLINIC | Age: 75
End: 2025-06-16
Attending: FAMILY MEDICINE
Payer: MEDICARE

## 2025-06-16 DIAGNOSIS — R26.89 IMBALANCE: ICD-10-CM

## 2025-06-16 DIAGNOSIS — G20.B1 PARKINSON'S DISEASE WITH DYSKINESIA WITHOUT FLUCTUATING MANIFESTATIONS (HCC): Primary | ICD-10-CM

## 2025-06-16 DIAGNOSIS — R29.6 FALLS: ICD-10-CM

## 2025-06-16 DIAGNOSIS — R26.9 GAIT ABNORMALITY: ICD-10-CM

## 2025-06-16 DIAGNOSIS — G20.A1 PARKINSON'S DISEASE, UNSPECIFIED WHETHER DYSKINESIA PRESENT, UNSPECIFIED WHETHER MANIFESTATIONS FLUCTUATE (HCC): Primary | ICD-10-CM

## 2025-06-16 PROCEDURE — 97112 NEUROMUSCULAR REEDUCATION: CPT

## 2025-06-16 PROCEDURE — 97530 THERAPEUTIC ACTIVITIES: CPT

## 2025-06-16 NOTE — PROGRESS NOTES
Daily Note     Today's date: 2025  Patient name: Femi Acosta  : 1950  MRN: [id number]  Referring provider: Apolinar Carr MD  Dx:   Encounter Diagnosis     ICD-10-CM    1. Parkinson's disease, unspecified whether dyskinesia present, unspecified whether manifestations fluctuate ([facility])  G20.A1       2. Imbalance  R26.89       3. Falls  R29.6       4. Gait abnormality  R26.9           Start Time: 1700  Stop Time: 1745  Total time in clinic (min): 45 minutes    Subjective: Patient arrives from OT with no new complaints. Decreased insight into amount of time he has been absent from the clinic.       Objective: See treatment diary below      Assessment: Tolerated treatment well and with pt demonstrating some understanding of education provided on choosing to start and stop exercises, getting up more frequently during the day, and focusing his energy onto a task deliberately. Patient demonstrated fatigue post treatment, exhibited good technique with therapeutic exercises, and would benefit from continued PT      Plan: Continue per plan of care.  Progress treatment as tolerated.   Continue to advance challenges as able.         Outcome Measures Initial Eval  2025 PN  3/5/25 PN  25 PN  25 PN  25    5xSTS 15.3 sec 13.85 sec 11.09 sec 12.43 sec 13.5 sec    TUG  - Regular  - Cognitive  - Carry   9.1 sec  9.6 sec  9.4 sec   7.69 sec  8.72 sec  8.59 sec   7.62 sec  7.07 sec  7.35 sec   7.25 sec  8.19 sec  8.06 sec   7.57 sec  8.75 sec  8.59 sec    MiniBEST /28        10 meter 1.28 m/s 1.23 m/s 1.41 m/s 1.46 m/s 1.37 m/s    6MWT 1535 ft 1655.8 ft 1643 ft 1593.2 ft 1541 ft    ABC 68.75% 69.38% 67.5% 70% 65.63%    PDQ-39 /100        MDS-UPDRS  Part III   /128        H&Y Stage         Floor > Stand  sec 29.56 sec 12.97 sec 11.25 sec 37.09 sec               Daily Exercise Log:  Start of Care: 25  POC expires      Date 25       Visit # 22 23       Insurance Auth [id number]        Auth Expires                    Manual                                             Neuro Re-Ed  38'       Agility ladder  Ins/outs 3 laps  Sidestep 3 laps progressed to side shuffle       STS w/ tidal tank  10# 2x15       Fwd/retro walk  6 laps       Step-ups         Pt education  Reviewed inc activity at home, shifting attention, dividing energy, starting and stopping activities                         Ther Ex                                                                                 Ther Activity                           Gait Training                           Modalities                           HEP:   TBD

## 2025-06-16 NOTE — PROGRESS NOTES
"Daily Note     Today's date: 2025  Patient name: Femi Acosta  : 1950  MRN: [id number]  Referring provider: Apolinar Carr MD  Dx:   Encounter Diagnosis   Name Primary?    Parkinson's disease with dyskinesia without fluctuating manifestations (HCC) Yes                   Start Time: 1620  Stop Time: 1700  Total time in clinic (min): 40 minutes    PLAN OF CARE START: 2025  PLAN OF CARE END: 2025  FREQUENCY: 2-3x/week  Precautions: FALL SAFETY, decreased insight to deficits, cog decline    Subjective: Pt reports \"that was medium difficulty\" in regards to trail making with sticky notes in hallway      Objective: See treatment below.    TA:  - card sorting task while tapping blaze pods to work on dual tasking, divided attention, and working memory. Required mod>max cues, faded to mod with slower speed of blaze pods for increased processing time  - obtaining sticky notes from walls posted in numeric order, alphabetical order, then upgraded to alternating letters/numbers to work on dynamic standing balance, scanning environment with functional mobility, alternating attention.      Assessment: Tolerated treatment well. Required increased time for processing of directions and benefited from decreased speed of blaze pods for processing time. He completed trail making with sticky notes with 1 self corrected error. Pt demonstrating slow processing speed, bradykinesia, decreased insight into cognitive deficits.  Pt would benefit from continued OT services to address functional cognition, endurance, LE strength, FMC and dexterity.      Plan: Continued skilled OT per POC.      "

## 2025-06-17 ENCOUNTER — OFFICE VISIT (OUTPATIENT)
Facility: CLINIC | Age: 75
End: 2025-06-17
Payer: MEDICARE

## 2025-06-17 DIAGNOSIS — G20.B1 PARKINSON'S DISEASE WITH DYSKINESIA WITHOUT FLUCTUATING MANIFESTATIONS (HCC): Primary | ICD-10-CM

## 2025-06-17 PROCEDURE — 97535 SELF CARE MNGMENT TRAINING: CPT

## 2025-06-17 PROCEDURE — 97530 THERAPEUTIC ACTIVITIES: CPT

## 2025-06-17 NOTE — PROGRESS NOTES
Daily Note     Today's date: 2025  Patient name: Femi Acosta  : 1950  MRN: [id number]  Referring provider: Apolinar Carr MD  Dx:   Encounter Diagnosis   Name Primary?    Parkinson's disease with dyskinesia without fluctuating manifestations (HCC) Yes           Start Time: 845  Stop Time: 09  Total time in clinic (min): 45 minutes    PLAN OF CARE START: 2025  PLAN OF CARE END: 2025  FREQUENCY: 2-3x/week  Precautions: FALL SAFETY, decreased insight to deficits, cog decline    Subjective: Pt reports it takes him about 30 min-1 hour to get dressed and ready for the morning      Objective: See treatment below.    Self care/ADLs:  - practiced using large amplitude movements for UB dressing including donning/doffing front button shirt and button/unbuttoning with use of finger flicks to increase speed - baseline speed was 41 seconds, improved to 28 seconds with use of strategies taught in session    TA  - orientation worksheet (rebecca) completed to work on spatial relations, orientation, sustained attention, simple problem solving      Assessment: Tolerated treatment well. Benefited from EDU and training on using large amplitude movements for UB dressing to cut time by >10 seconds for UB dressing. Required increased processing time and min>mod cues for orientation worksheet. Pt demonstrating slow processing speed, bradykinesia, decreased insight into cognitive deficits.  Pt would benefit from continued OT services to address functional cognition, endurance, LE strength, FMC and dexterity.      Plan: Continued skilled OT per POC.

## 2025-06-18 ENCOUNTER — APPOINTMENT (OUTPATIENT)
Facility: CLINIC | Age: 75
End: 2025-06-18
Payer: MEDICARE

## 2025-06-23 ENCOUNTER — OFFICE VISIT (OUTPATIENT)
Facility: CLINIC | Age: 75
End: 2025-06-23
Payer: MEDICARE

## 2025-06-23 DIAGNOSIS — G20.B1 PARKINSON'S DISEASE WITH DYSKINESIA WITHOUT FLUCTUATING MANIFESTATIONS (HCC): Primary | ICD-10-CM

## 2025-06-23 PROCEDURE — 97530 THERAPEUTIC ACTIVITIES: CPT

## 2025-06-23 NOTE — PROGRESS NOTES
"Daily Note     Today's date: 2025  Patient name: Femi Acosta  : 1950  MRN: [id number]  Referring provider: Apolinar Carr MD  Dx:   Encounter Diagnosis   Name Primary?    Parkinson's disease with dyskinesia without fluctuating manifestations (HCC) Yes             Start Time: 1530  Stop Time: 1615  Total time in clinic (min): 45 minutes    PLAN OF CARE START: 2025  PLAN OF CARE END: 2025  FREQUENCY: 2-3x/week  Precautions: FALL SAFETY, decreased insight to deficits, cog decline    Subjective: Pt reports \"I am scared if I score too low on my cognitive impairment testing that I will be put in a nursing home.\"       Objective: See treatment below.      TA  - obtaining foam blocks with green clothespin to work on amplified reaching across the table, pinch strength, and placing in alphabetical order with a naming component to work on alternating attention, language, direction following  -orientation worksheet (rebecca) finished from previous session to work on spatial relations, orientation, sustained attention, simple problem solving      Assessment: Tolerated treatment well. Pt exhibiting fear of being placed in a nursing home again today's session, counseling provided throughout session to ensure Pt that therapist cannot make this determination for the Pt and that this is up to him and his family. Freezing of thought noted during alphabetizing task requiring cues to re-direct to task. Pt demonstrating slow processing speed, bradykinesia, decreased insight into cognitive deficits.  Pt would benefit from continued OT services to address functional cognition, endurance, LE strength, FMC and dexterity.      Plan: Continued skilled OT per POC.    "

## 2025-06-25 ENCOUNTER — EVALUATION (OUTPATIENT)
Facility: CLINIC | Age: 75
End: 2025-06-25
Payer: MEDICARE

## 2025-06-25 ENCOUNTER — OFFICE VISIT (OUTPATIENT)
Facility: CLINIC | Age: 75
End: 2025-06-25
Payer: MEDICARE

## 2025-06-25 DIAGNOSIS — G20.A1 PARKINSON'S DISEASE, UNSPECIFIED WHETHER DYSKINESIA PRESENT, UNSPECIFIED WHETHER MANIFESTATIONS FLUCTUATE (HCC): Primary | ICD-10-CM

## 2025-06-25 DIAGNOSIS — R49.9 UNSPECIFIED VOICE AND RESONANCE DISORDER: ICD-10-CM

## 2025-06-25 DIAGNOSIS — G20.B1 PARKINSON'S DISEASE WITH DYSKINESIA WITHOUT FLUCTUATING MANIFESTATIONS (HCC): Primary | ICD-10-CM

## 2025-06-25 PROCEDURE — 97112 NEUROMUSCULAR REEDUCATION: CPT

## 2025-06-25 PROCEDURE — 92524 BEHAVRAL QUALIT ANALYS VOICE: CPT

## 2025-06-25 NOTE — PROGRESS NOTES
"Daily Note     Today's date: 2025  Patient name: Femi Acosta  : 1950  MRN: [id number]  Referring provider: Apolinar Carr MD  Dx:   Encounter Diagnosis   Name Primary?    Parkinson's disease with dyskinesia without fluctuating manifestations (HCC) Yes               Start Time: 1400  Stop Time: 1445  Total time in clinic (min): 45 minutes    PLAN OF CARE START: 2025  PLAN OF CARE END: 2025  FREQUENCY: 2-3x/week  Precautions: FALL SAFETY, decreased insight to deficits, cog decline    Subjective: Pt reports \"losing my train of thought is a problem\"      Objective: See treatment below.      NM  - side stepping along countertop over hurdles to match 1/2 cut cards with a naming component based on suit, Pt requiring mod verbal cues to recall categories and to assist with naming component       Assessment: Tolerated treatment well. Pt requiring mod verbal cues to recall categories and to assist with naming component, he frequently had freezing of his thought requiring cues to redirect to task. Pt demonstrating slow processing speed, bradykinesia, decreased insight into cognitive deficits.  Pt would benefit from continued OT services to address functional cognition, endurance, LE strength, FMC and dexterity.      Plan: Continued skilled OT per POC.    "

## 2025-06-25 NOTE — PROGRESS NOTES
Speech-Language Pathology Initial Evaluation    Today's date: 2025   Patient’s name: Femi Acosta  : 1950  MRN: [id number]  Safety measures: Fall, parkinsons  Referring provider: Apolinar Carr MD    Encounter Diagnosis     ICD-10-CM    1. Parkinson's disease, unspecified whether dyskinesia present, unspecified whether manifestations fluctuate (HCC)  G20.A1       2. Unspecified voice and resonance disorder  R49.9           Assessment:    Patient presents with a mild-moderate voice disorder, characterized by reduced loudness, monotone pitch, and a hoarse vocal quality, which contributed to an overall decrease in speech intelligibility. Based on stimulability testing, patient is an excellent candidate for speech therapy targeting vocal loudness and coordination of respiration and phonation.     Discussed vocal hygiene and increasing water intake. Also discussed f/u with ENT due to s/s LPR. Patient may also benefit from VBS to assess swallow function and EMST to improve voice/cough/swallow function. Further assessment of swallowing will be completed in future sessions.     Discussed all recommendations with patient and his spouse.       Short-term goals:     Goal 1: Patient will complete maximum phonation time exercises with spl of approximately 80 to 85 dB for 15-20 seconds, to be achieved in 6-8 weeks.      Goal 2: Patient will complete high and low pitch glides with spl of approximately 75 to 80 dB, to be achieved in 6-8 weeks.      Goal 3: Patient will read words and phrases with spl of approximately 80 to 85 dB, to be achieved in 6-8 weeks .      Goal 4: Patient will read sentences/paragraphs of increasing length and complexity with spl of approximately 80 to 85 dB, to be achieved in 6-8 weeks..      Goal 5: Patient will engage in conversational speech with ability to self-monitor adequate loudness at spl of approximately 75 to 80 dB, to be achieved in 6-8 weeks.       Goal 6. Patient will  "participate in further assessment of swallow function, to be achieved in 6-8 weeks.       Long-term goals:     Goal 1: The patient will improve coordination between the subsystems of respiration, phonation, and articulation for functional speech production with a variety of communication partners across environmental and situational contexts.     Goal 2: Patient will increase loudness in conversational speech allowing her to converse in the community, increase voice use, slow progression of vocal deterioration, and improve quality of life.        Plan:  Patient would benefit from outpatient skilled Speech Therapy services: Voice therapy    Frequency: 2x weekly  Duration: 6-8 weeks    Intervention certification from: 6/25/2025  Intervention certification to: 8/20/2025      Subjective:  History of present illness: Patient is a 75 y.o. male who was referred to outpatient skilled Speech Therapy services for a voice evaluation. Past medical history significant for Parkinson's. Patient attended evaluation with spouse. She verbalized the following concerns:    - Quiet voice  - Hoarse vocal quality  - Feeling of something stuck in his throat all the time  - Some difficulty with swallowing    Patient's goal(s): To improve vocal quality and get back to singing.    Pain: Absent     Objective:    Vocal Intensity Assessment:    Sound level meter-to-mouth distance: 50 cm throughout testing    BASELINE ASSESSMENT OF SPEECH:  Pre-treatment interview: 69 dB SPL    Pre-treatment spontaneous speech sample (conversation): 67 dB SPL    Pre-treatment oral reading (\"Grandfather Passage\"): 69 dB SPL    Sustained phonation of \"ah\" (without cueing):   Trial 1: 11.6 seconds @ 77 dB SPL  Trial 2: 12.7 seconds @ 67 dB SPL    *Patient was noted to have the following impairments: reduced vocal intensity, monopitch/monoloudness, and hoarseness      STIMULABILITY TESTING:  Sustained phonation of \"ah\" (with cueing of \"INTENT\"):   Trial 1: 12.3 " seconds @ 69 dB SPL -- IMPROVEMENT  Trial 2: 11.8 seconds @ 75 dB SPL -- IMPROVEMENT    Repeating phrases (with cueing of INTENT): 80 dB SPL      *Patient demonstrated the following improvements with cueing: vocal intensity, breath support for speech, and vocal quality        Voice Handicap Index (VHI):  The VHI is a list of 30 statements that many people have used to describe their voices and the effects of their voices on their lives. Patient indicated how frequently he has the same experience using a rating point scale (“never” = 0, “almost never” = 1, “sometimes” = 2, “almost always” = 3, and “always” = 4).  Results were as follows:    Subscale: Score: Self-Perceived Impairment Level:   Physical 20/40 Mild   Functional 20/40 Mild   Emotional 16/40 Mild        TOTAL 56/120 Moderate       Treatment:  -Therapy targeting vocal loudness      Visit Tracking:  POC   Expires Auth Expiration Date ST Visit Limit   8/20/2025 N/a bomn          Visit/Unit Tracking:  Auth Status Date 6/25/25   None needed Used 1    Remaining bomn       Intervention Comments:  Discussed plan of care with patient and spouse.

## 2025-06-25 NOTE — LETTER
2025    Apolinar Carr MD  [address]  [address]  [facility] [address]    Patient: Femi Acosta   YOB: 1950   Date of Visit: 2025     Encounter Diagnosis     ICD-10-CM    1. Parkinson's disease, unspecified whether dyskinesia present, unspecified whether manifestations fluctuate (HCC)  G20.A1       2. Unspecified voice and resonance disorder  R49.9           Dear Dr. Apolinar Carr MD:    Thank you for your recent referral of Femi Acosta. Please review the attached evaluation summary from Femi's recent visit.     Please verify that you agree with the plan of care by signing the attached order.     If you have any questions or concerns, please do not hesitate to call.     I sincerely appreciate the opportunity to share in the care of one of your patients and hope to have another opportunity to work with you in the near future.     Sincerely,    PRICE Woo      Referring Provider:     Based upon review of the patient's progress and continued therapy plan, it is my medical opinion that Femi Acosta should continue speech therapy treatment at the Physical Therapy at [facility]:                    Apolinar Carr MD  [address]  [address]  [facility] [address]  Via Fax: [phone]        Speech-Language Pathology Initial Evaluation    Today's date: 2025   Patient’s name: Femi Acosta  : 1950  MRN: [id number]  Safety measures: Fall, parkinsons  Referring provider: Apolinar Carr MD    Encounter Diagnosis     ICD-10-CM    1. Parkinson's disease, unspecified whether dyskinesia present, unspecified whether manifestations fluctuate (HCC)  G20.A1       2. Unspecified voice and resonance disorder  R49.9           Assessment:    Patient presents with a mild-moderate voice disorder, characterized by reduced loudness, monotone pitch, and a hoarse vocal quality, which contributed to an overall decrease in speech intelligibility. Based on  stimulability testing, patient is an excellent candidate for speech therapy targeting vocal loudness and coordination of respiration and phonation.     Discussed vocal hygiene and increasing water intake. Also discussed f/u with ENT due to s/s LPR. Patient may also benefit from VBS to assess swallow function and EMST to improve voice/cough/swallow function. Further assessment of swallowing will be completed in future sessions.     Discussed all recommendations with patient and his spouse.       Short-term goals:     Goal 1: Patient will complete maximum phonation time exercises with spl of approximately 80 to 85 dB for 15-20 seconds, to be achieved in 6-8 weeks.      Goal 2: Patient will complete high and low pitch glides with spl of approximately 75 to 80 dB, to be achieved in 6-8 weeks.      Goal 3: Patient will read words and phrases with spl of approximately 80 to 85 dB, to be achieved in 6-8 weeks .      Goal 4: Patient will read sentences/paragraphs of increasing length and complexity with spl of approximately 80 to 85 dB, to be achieved in 6-8 weeks..      Goal 5: Patient will engage in conversational speech with ability to self-monitor adequate loudness at spl of approximately 75 to 80 dB, to be achieved in 6-8 weeks.       Goal 6. Patient will participate in further assessment of swallow function, to be achieved in 6-8 weeks.       Long-term goals:     Goal 1: The patient will improve coordination between the subsystems of respiration, phonation, and articulation for functional speech production with a variety of communication partners across environmental and situational contexts.     Goal 2: Patient will increase loudness in conversational speech allowing her to converse in the community, increase voice use, slow progression of vocal deterioration, and improve quality of life.        Plan:  Patient would benefit from outpatient skilled Speech Therapy services: Voice therapy    Frequency: 2x  "weekly  Duration: 6-8 weeks    Intervention certification from: 6/25/2025  Intervention certification to: 8/20/2025      Subjective:  History of present illness: Patient is a 75 y.o. male who was referred to outpatient skilled Speech Therapy services for a voice evaluation. Past medical history significant for Parkinson's. Patient attended evaluation with spouse. She verbalized the following concerns:    - Quiet voice  - Hoarse vocal quality  - Feeling of something stuck in his throat all the time  - Some difficulty with swallowing    Patient's goal(s): To improve vocal quality and get back to singing.    Pain: Absent     Objective:    Vocal Intensity Assessment:    Sound level meter-to-mouth distance: 50 cm throughout testing    BASELINE ASSESSMENT OF SPEECH:  Pre-treatment interview: 69 dB SPL    Pre-treatment spontaneous speech sample (conversation): 67 dB SPL    Pre-treatment oral reading (\"Grandfather Passage\"): 69 dB SPL    Sustained phonation of \"ah\" (without cueing):   Trial 1: 11.6 seconds @ 77 dB SPL  Trial 2: 12.7 seconds @ 67 dB SPL    *Patient was noted to have the following impairments: reduced vocal intensity, monopitch/monoloudness, and hoarseness      STIMULABILITY TESTING:  Sustained phonation of \"ah\" (with cueing of \"INTENT\"):   Trial 1: 12.3 seconds @ 69 dB SPL -- IMPROVEMENT  Trial 2: 11.8 seconds @ 75 dB SPL -- IMPROVEMENT    Repeating phrases (with cueing of INTENT): 80 dB SPL      *Patient demonstrated the following improvements with cueing: vocal intensity, breath support for speech, and vocal quality        Voice Handicap Index (VHI):  The VHI is a list of 30 statements that many people have used to describe their voices and the effects of their voices on their lives. Patient indicated how frequently he has the same experience using a rating point scale (“never” = 0, “almost never” = 1, “sometimes” = 2, “almost always” = 3, and “always” = 4).  Results were as follows:    Subscale: Score: " Self-Perceived Impairment Level:   Physical 20/40 Mild   Functional 20/40 Mild   Emotional 16/40 Mild        TOTAL 56/120 Moderate       Treatment:  -Therapy targeting vocal loudness      Visit Tracking:  POC   Expires Auth Expiration Date ST Visit Limit   8/20/2025 N/a bomn          Visit/Unit Tracking:  Auth Status Date 6/25/25   None needed Used 1    Remaining bomn       Intervention Comments:  Discussed plan of care with patient and spouse.

## 2025-06-25 NOTE — LETTER
2025    Apolinar Carr MD  [address]  [address]  [facility] [address]    Patient: Femi Acosta   YOB: 1950   Date of Visit: 2025     Encounter Diagnosis     ICD-10-CM    1. Parkinson's disease, unspecified whether dyskinesia present, unspecified whether manifestations fluctuate (HCC)  G20.A1       2. Unspecified voice and resonance disorder  R49.9           Dear Dr. Apolinar Carr MD:    Thank you for your recent referral of Femi Acosta. Please review the attached evaluation summary from Femi's recent visit.     Please verify that you agree with the plan of care by signing the attached order.     If you have any questions or concerns, please do not hesitate to call.     I sincerely appreciate the opportunity to share in the care of one of your patients and hope to have another opportunity to work with you in the near future.     Sincerely,    PRICE Woo      Referring Provider:     Based upon review of the patient's progress and continued therapy plan, it is my medical opinion that Femi Acosta should continue speech therapy treatment at the Physical Therapy at [facility]:                    Apolinar Carr MD  [address]  [address]  [facility] [address]  Via Fax: [phone]        Speech-Language Pathology Initial Evaluation    Today's date: 2025   Patient’s name: Femi Acosta  : 1950  MRN: [id number]  Safety measures: Fall, parkinsons  Referring provider: Apolinar Carr MD    Encounter Diagnosis     ICD-10-CM    1. Parkinson's disease, unspecified whether dyskinesia present, unspecified whether manifestations fluctuate (HCC)  G20.A1       2. Unspecified voice and resonance disorder  R49.9           Assessment:    Patient presents with a mild-moderate voice disorder, characterized by reduced loudness, monotone pitch, and a hoarse vocal quality, which contributed to an overall decrease in speech intelligibility. Based on  stimulability testing, patient is an excellent candidate for speech therapy targeting vocal loudness and coordination of respiration and phonation.     Discussed vocal hygiene and increasing water intake. Also discussed f/u with ENT due to s/s LPR. Patient may also benefit from VBS to assess swallow function and EMST to improve voice/cough/swallow function. Further assessment of swallowing will be completed in future sessions.     Discussed all recommendations with patient and his spouse.       Short-term goals:     Goal 1: Patient will complete maximum phonation time exercises with spl of approximately 80 to 85 dB for 15-20 seconds, to be achieved in 6-8 weeks.      Goal 2: Patient will complete high and low pitch glides with spl of approximately 75 to 80 dB, to be achieved in 6-8 weeks.      Goal 3: Patient will read words and phrases with spl of approximately 80 to 85 dB, to be achieved in 6-8 weeks .      Goal 4: Patient will read sentences/paragraphs of increasing length and complexity with spl of approximately 80 to 85 dB, to be achieved in 6-8 weeks..      Goal 5: Patient will engage in conversational speech with ability to self-monitor adequate loudness at spl of approximately 75 to 80 dB, to be achieved in 6-8 weeks.       Goal 6. Patient will participate in further assessment of swallow function, to be achieved in 6-8 weeks.       Long-term goals:     Goal 1: The patient will improve coordination between the subsystems of respiration, phonation, and articulation for functional speech production with a variety of communication partners across environmental and situational contexts.     Goal 2: Patient will increase loudness in conversational speech allowing her to converse in the community, increase voice use, slow progression of vocal deterioration, and improve quality of life.        Plan:  Patient would benefit from outpatient skilled Speech Therapy services: Voice therapy    Frequency: 2x  "weekly  Duration: 6-8 weeks    Intervention certification from: 6/25/2025  Intervention certification to: 8/20/2025      Subjective:  History of present illness: Patient is a 75 y.o. male who was referred to outpatient skilled Speech Therapy services for a voice evaluation. Past medical history significant for Parkinson's. Patient attended evaluation with spouse. She verbalized the following concerns:    - Quiet voice  - Hoarse vocal quality  - Feeling of something stuck in his throat all the time  - Some difficulty with swallowing    Patient's goal(s): To improve vocal quality and get back to singing.    Pain: Absent     Objective:    Vocal Intensity Assessment:    Sound level meter-to-mouth distance: 50 cm throughout testing    BASELINE ASSESSMENT OF SPEECH:  Pre-treatment interview: 69 dB SPL    Pre-treatment spontaneous speech sample (conversation): 67 dB SPL    Pre-treatment oral reading (\"Grandfather Passage\"): 69 dB SPL    Sustained phonation of \"ah\" (without cueing):   Trial 1: 11.6 seconds @ 77 dB SPL  Trial 2: 12.7 seconds @ 67 dB SPL    *Patient was noted to have the following impairments: reduced vocal intensity, monopitch/monoloudness, and hoarseness      STIMULABILITY TESTING:  Sustained phonation of \"ah\" (with cueing of \"INTENT\"):   Trial 1: 12.3 seconds @ 69 dB SPL -- IMPROVEMENT  Trial 2: 11.8 seconds @ 75 dB SPL -- IMPROVEMENT    Repeating phrases (with cueing of INTENT): 80 dB SPL      *Patient demonstrated the following improvements with cueing: vocal intensity, breath support for speech, and vocal quality        Voice Handicap Index (VHI):  The VHI is a list of 30 statements that many people have used to describe their voices and the effects of their voices on their lives. Patient indicated how frequently he has the same experience using a rating point scale (“never” = 0, “almost never” = 1, “sometimes” = 2, “almost always” = 3, and “always” = 4).  Results were as follows:    Subscale: Score: " Self-Perceived Impairment Level:   Physical 20/40 Mild   Functional 20/40 Mild   Emotional 16/40 Mild        TOTAL 56/120 Moderate       Treatment:  -Therapy targeting vocal loudness      Visit Tracking:  POC   Expires Auth Expiration Date ST Visit Limit   8/20/2025 N/a bomn          Visit/Unit Tracking:  Auth Status Date 6/25/25   None needed Used 1    Remaining bomn       Intervention Comments:  Discussed plan of care with patient and spouse.

## 2025-06-30 ENCOUNTER — OFFICE VISIT (OUTPATIENT)
Facility: CLINIC | Age: 75
End: 2025-06-30
Payer: MEDICARE

## 2025-06-30 ENCOUNTER — OFFICE VISIT (OUTPATIENT)
Facility: CLINIC | Age: 75
End: 2025-06-30
Attending: FAMILY MEDICINE
Payer: MEDICARE

## 2025-06-30 DIAGNOSIS — R26.9 GAIT ABNORMALITY: ICD-10-CM

## 2025-06-30 DIAGNOSIS — R49.9 UNSPECIFIED VOICE AND RESONANCE DISORDER: ICD-10-CM

## 2025-06-30 DIAGNOSIS — R29.6 FALLS: ICD-10-CM

## 2025-06-30 DIAGNOSIS — G20.B1 PARKINSON'S DISEASE WITH DYSKINESIA WITHOUT FLUCTUATING MANIFESTATIONS (HCC): Primary | ICD-10-CM

## 2025-06-30 DIAGNOSIS — R26.89 IMBALANCE: ICD-10-CM

## 2025-06-30 DIAGNOSIS — G20.A1 PARKINSON'S DISEASE, UNSPECIFIED WHETHER DYSKINESIA PRESENT, UNSPECIFIED WHETHER MANIFESTATIONS FLUCTUATE (HCC): Primary | ICD-10-CM

## 2025-06-30 PROCEDURE — 97530 THERAPEUTIC ACTIVITIES: CPT

## 2025-06-30 PROCEDURE — 92507 TX SP LANG VOICE COMM INDIV: CPT

## 2025-06-30 PROCEDURE — 97112 NEUROMUSCULAR REEDUCATION: CPT

## 2025-06-30 NOTE — PROGRESS NOTES
Daily Note     Today's date: 2025  Patient name: Femi Acosta  : 1950  MRN: [id number]  Referring provider: Apolinar Carr MD  Dx:   Encounter Diagnosis     ICD-10-CM    1. Parkinson's disease, unspecified whether dyskinesia present, unspecified whether manifestations fluctuate ([facility])  G20.A1       2. Imbalance  R26.89       3. Falls  R29.6       4. Gait abnormality  R26.9           Start Time: 1530  Stop Time: 1615  Total time in clinic (min): 45 minutes    Subjective: Patient arrives from OT with some confusion due to attempting to find his wife to ask her a question, with no new complaints. Wife reports they will be up in [place] next week, so appts were removed.      Objective: See treatment diary below      Assessment: Tolerated treatment fairly, with pt demonstrating significant impairments in ability to shift weight A/P & M/L on rockerboard at lowest level. Patient demonstrating fair sequencing of agility ladder after first several bouts requiring near constant cueing. Patient demonstrated fatigue post treatment, exhibited good technique with therapeutic exercises, and would benefit from continued PT      Plan: Continue per plan of care.  Progress treatment as tolerated.         Outcome Measures Initial Eval  2025 PN  3/5/25 PN  25 PN  25 PN  25    5xSTS 15.3 sec 13.85 sec 11.09 sec 12.43 sec 13.5 sec    TUG  - Regular  - Cognitive  - Carry   9.1 sec  9.6 sec  9.4 sec   7.69 sec  8.72 sec  8.59 sec   7.62 sec  7.07 sec  7.35 sec   7.25 sec  8.19 sec  8.06 sec   7.57 sec  8.75 sec  8.59 sec    MiniBEST /28        10 meter 1.28 m/s 1.23 m/s 1.41 m/s 1.46 m/s 1.37 m/s    6MWT 1535 ft 1655.8 ft 1643 ft 1593.2 ft 1541 ft    ABC 68.75% 69.38% 67.5% 70% 65.63%    PDQ-39 /100        MDS-UPDRS  Part III   /128        H&Y Stage         Floor > Stand  sec 29.56 sec 12.97 sec 11.25 sec 37.09 sec               Daily Exercise Log:  Start of Care: 25  POC expires  25    Date 25  6/16/25 6/30/25      Visit # 22 23 24      Insurance Auth BOMN        Auth Expires                   Manual                                             Neuro Re-Ed  38' 40'      Agility ladder  Ins/outs 3 laps  Sidestep 3 laps progressed to side shuffle Ins/outs 4 laps, cues for seq  Sidestep w/ tidal tank 4 laps      STS w/ tidal tank  10# 2x15 10# 2x1' (15 reps,14 reps)      Fwd/retro walk  6 laps       Step-ups         Pt education  Reviewed inc activity at home, shifting attention, dividing energy, starting and stopping activities        rocker board   A/P & M/L max cues req for wt shift and use of ankle movements especially. Poor wt shift to L side noted, with pt able to complete with man facilitation                                 Ther Ex                                                                                 Ther Activity                           Gait Training                           Modalities                           HEP:   TBD

## 2025-06-30 NOTE — PROGRESS NOTES
"Speech Treatment Note    Today's date: 2025  Patient name: Femi Acosta  : 1950  MRN: [id number]  Referring provider: Apolinar Carr MD  Dx:   Encounter Diagnosis     ICD-10-CM    1. Parkinson's disease, unspecified whether dyskinesia present, unspecified whether manifestations fluctuate ([facility])  G20.A1       2. Unspecified voice and resonance disorder  R49.9                      Visit Number: 2    Subjective/Behavioral: Attended session with spouse. Remained motivated and engaged throughout session. Formal data with dB meter not taken this session. Duration of session was spent on education re: use of intent/purpose and making sure patient was able to complete required exercises.    Goal 1: Patient will complete maximum phonation time exercises with spl of approximately 80 to 85 dB for 15-20 seconds, to be achieved in 6-8 weeks.   Completed x10. Average loudness was 80 dB. Average length was 9.6 seconds.      Goal 2: Patient will complete high and low pitch glides with spl of approximately 75 to 80 dB, to be achieved in 6-8 weeks.   Was able to combine loudness with good quality to complete pitch glides given a clinician model.     Goal 3: Patient will read words and phrases with spl of approximately 80 to 85 dB, to be achieved in 6-8 weeks .   Phrases were read with an average loudness of 82 dB. Required min cues for \"intent\"- shows good ability to self correct after cueing.      Goal 4: Patient will read sentences/paragraphs of increasing length and complexity with spl of approximately 80 to 85 dB, to be achieved in 6-8 weeks.  Did not target.      Goal 5: Patient will engage in conversational speech with ability to self-monitor adequate loudness at spl of approximately 75 to 80 dB, to be achieved Did not target.     Goal 6. Patient will participate in further assessment of swallow function, to be achieved in 6-8 weeks.   Did not target.    Other:Patient's family member was present was present during " today's session. and Patient was provided with home exercises/ activies to target goals in plan of care.  Recommendations:Continue with Plan of Care

## 2025-06-30 NOTE — PROGRESS NOTES
Daily Note     Today's date: 2025  Patient name: Femi Acosta  : 1950  MRN: [id number]  Referring provider: Apolinar Carr MD  Dx:   Encounter Diagnosis   Name Primary?    Parkinson's disease with dyskinesia without fluctuating manifestations (HCC) Yes                 Start Time: 1450  Stop Time: 1530  Total time in clinic (min): 40 minutes    PLAN OF CARE START: 2025  PLAN OF CARE END: 2025  FREQUENCY: 2-3x/week  Precautions: FALL SAFETY, decreased insight to deficits, cog decline    Subjective: Pt reports he is going up to [place] with his wife soon.      Objective: See treatment below.      TA  - completed picture incongruities worksheet to work on  skills, sustained attention in a multimodal environment      Assessment: Tolerated treatment well. Pt required significantly increased processing time to determine incongruities within pictures provided. Processing speed increased when external distractions decreased. Pt demonstrating slow processing speed, bradykinesia, decreased insight into cognitive deficits.  Pt would benefit from continued OT services to address functional cognition, endurance, LE strength, FMC and dexterity.      Plan: Continued skilled OT per POC.

## 2025-07-02 ENCOUNTER — EVALUATION (OUTPATIENT)
Facility: CLINIC | Age: 75
End: 2025-07-02
Payer: MEDICARE

## 2025-07-02 DIAGNOSIS — G20.B1 PARKINSON'S DISEASE WITH DYSKINESIA WITHOUT FLUCTUATING MANIFESTATIONS (HCC): Primary | ICD-10-CM

## 2025-07-02 PROCEDURE — 97530 THERAPEUTIC ACTIVITIES: CPT

## 2025-07-02 PROCEDURE — 97112 NEUROMUSCULAR REEDUCATION: CPT

## 2025-07-02 NOTE — PROGRESS NOTES
OCCUPATIONAL THERAPY RE-EVALUATION    Today's Date: 2025  Patient Name: Feim Acosta  : 1950  MRN: [id number]  Referring Provider: Apolinar Carr MD  Dx: Parkinson's disease with dyskinesia without fluctuating manifestations (HCC) [G20.B1]    Active Problem List: There is no problem list on file for this patient.    Past Medical Hx:   Past Medical History:   Diagnosis Date    Allergic rhinitis     Constipation     GERD (gastroesophageal reflux disease)     Hyperlipidemia     Overactive bladder     Parkinson disease (HCC)     Prostate enlargement      Past Surgical Hx:   Past Surgical History:   Procedure Laterality Date    ANKLE FRACTURE SURGERY      CYST REMOVAL             SKILLED ANALYSIS:  Pt presents to OP OT re-evaluation after about 1 month of skilled OT, previously being seen by this clinic but took about a month and a half off of therapy due to traveling secondary to PD, previously being seen by this clinic since 2025. As per report, dressing is very slow and taking a long time for him to do, specifically manipulating buttons and donning socks.      Today, re-evaluation was completed using the MoCA, trail making A/B, and 9 hole peg test. Pt is demonstrating 1 point improvement in overall MoCA score, improvements noted in visuospatial skills, orientation, naming, and serial subtraction. 9 hole peg test time has improved BL, although with continued bradykinesia noted throughout testing. His TM A time has declined, indicating decreased processing speed, problem solving, and difficulty self-recognizing errors. Pt continues to demonstrate significant deficits in the following domains: overall functional cognition, sustained attn, divided attn, EF, , immediate and delayed visual recall, endurance, activity tolerance. He has met 1 goal at this time and is progressing towards the rest. Recommend continued participation in OP OT services 2-3x/wk for another 8-12 weeks to maximize  "functioning and independence with daily activities.  Discussed results and recommendations with pt and his wife, both are in agreement.    Subjective  7/2: \"Getting ready in the morning is slow, buttons and my socks are hard. Some balance has improved, I can catch myself better before I fall.\"  6/4: Pt reports \"I guess I'm a bit slow\"  4/14 Pt reports \"my wife just really pushes me to do more exercising at home, I don't know if I can do it all.\"  3/10: \"I am doing good at home, I'm going on more walks because its getting nicer out now.\"  2/3: \"It shocks me how slow it takes me sometimes to figure these things out during sessions\"      Occupational Profile:   Pt is a 74 year old retired . He enjoys to work outside and garden. Currently the pt having difficulty with writing, putting on clothing, working in his garden, performing lawn work and computer/cell phone usage. Over the past year or two the pt also states he has had more difficulty maintaining a conversation, as he loses his train of thought easily.    PATIENT GOAL: Improve writing, endurance, cognition, engage in RSB program.     HISTORY OF PRESENT ILLNESS:     Pt is a 75 y.o. male who was referred to Occupational Therapy s/p  Parkinson's disease with dyskinesia without fluctuating manifestations (HCC) [G20.B1].     PMH:   Past Medical History:   Diagnosis Date    Allergic rhinitis     Constipation     GERD (gastroesophageal reflux disease)     Hyperlipidemia     Overactive bladder     Parkinson disease (HCC)     Prostate enlargement        Past Surgical Hx:   Past Surgical History:   Procedure Laterality Date    ANKLE FRACTURE SURGERY  1975    CYST REMOVAL  1972     PLAN OF CARE START: 6/4/2025  PLAN OF CARE END: 9/4/2025  FREQUENCY: 2-3x/week  Precautions: FALL SAFETY, decreased insight to deficits, cog decline    OBJECTIVE     Motor-Free Visual Perception Test (4th Edition):  Is an individually administered assessment of visual-perceptual skills " commonly used in everyday activities.  Raw Score: 26  Standard Score: 84  Percentile Rank: 14%ile  Results: Indicating decreased  skills  NT 7/    9 HOLE PEG TEST: performed 9 hole peg test to assess dexterity/fine motor coordination with pt scoring:  R: 1 min 29 sec (prior: 1 min 32 sec) IMPROVED  L: 1 min 20 sec (prior: 1 min 22 sec) IMPROVED  Pt demonstrating decreased FMC related to age-related norms     Trail Making Test A: 3 min 13 sec with 1 VC for problem solving and 1 error (prior: 2 min 10 sec IND) DECLINE  Trail Making Test B: 1 min to complete sample with 1 error (modified due to difficulty following instructions for whole test) (prior: Unable to complete due to inability to follow instructions)  Norms: A: 40.13 seconds, B: 86.27 seconds.    Scores imply severe deficits with sustained and divided attn, EF, working memory.      Contextual Memory Test:    Immediate:     Delayed: , 1 confabulation NT /    Matthew Cognitive Assessment Version 8.3 (MoCA V8.3)  Visuospatial/executive functionin/5 *improved  Naming:  3/3  Memory: 1st trial:  5/5, 2nd trial:  5/5  Attention/concentration: 2/2  List of letters: 1/  Seial Seven Subtraction:  3/3 *improved  Language/sentence repetition:  0/2  Language Fluency:  0/1 (n=9) *improved number of words from last time  Abstract/Correlational Thinkin/2  Delayed Recall:  0/5  Orientation:  5/6 *improved              Memory Index Score: 11/15  MoCA V1 8.2 Raw Score:  18, MIS:  4/15 (prior , MIS 11/15), indicative of MILD neurocognitive impairments.    MoCA Scoring        Normal: 26+         Mild Cognitive Impairment: 18-25          Moderate Cognitive Impairment: 10-17         Severe Cognitive Impairment: <10         NT 7/2                              Physical Performance Test             Time       Score   Write sentence 18   2   Simulated  eating 21.21 1    Lift book 3.7 (blue binder) 3     Jacket   32 1                                       "      Violetta 4.5 2                                        Turn 360 2 0                                    50 foot walk  16.2 3      9 point scale:   - 32-36= no impairment  - 25-32= mild   - 17-24 moderate  - < 17 = unable to function in community.  7 point scale:  - < 19.4 mild  - 32-36 not frail.       NT 7/2   UE Strength:              MALCOM: RUE\" 72lb (previously 92 lbs) LUE: 74lb (previously 95 lbs)  The age norm is approximately 55-65 lbs and indicating normal  strength  Pt is L hand dominant      Range of Motion:  AROM:   BUE within normal limits     MMT:   R UE: 4+/5 in all pivots      L UE 4+/5 in all pivots      Pt is L hand dominant     GOALS:   Short Term Goals: 4-6 weeks   Pt will demonstrate independence with UE strengthening HEP as per patient report Progressing  Pt will increase delayed recall and recall 3/5 items on MOCA to complete IADLs No change  Pt will increase PPT to 13 points overall for IADLs NT 7/2  Pt will demonstrate improved functional cognition as evidenced by improving score on the MoCA to 18/30 to improve performance during ADLs and IADLs Goal met 7/2  Pt will increase FMC to 1min 15sec on the L on 9 hole peg to complete ADLs and IADLs Progressing  Pt will improve MVPT raw score to 28 as evidence of improved  skills for ADLs, IADLs NT 7/2        Long Term Goals: 8-12 weeks   Pt will improve functional endurance to engage in daily tasks or yard works without complaints of fatigue. Progressing  Pt will improve performance with fasteners for clothing management as per patient report.  Progressing  Pt will increase PPT to 14 points overall for IADLs NT 7/2  Pt will demonstrate improved functional cognition as evidenced by improving score on the MoCA to 19/30 to improve performance during ADLs and IADLs Progressing  Pt will increase FMC to 1min on the L on 9 hole peg to complete ADLs and IADLs Progressing  Pt will increase delayed recall and recall 3/5 items on MOCA to complete IADLs  " No change  Pt will improve MVPT raw score to 32 as evidence of improved  skills for ADLs, IADLs NT 7/2    OTHER PLANNED THERAPY INTERVENTIONS:   Supine, seated, and in stance neuro re-ed  FMC/prehension  Timed Trials  Hand to target  Seated functional reach: crossing midline  Closed chain activities  Open chain activities  Internal and external memory aides  Multimatrix for saccades/ visual clutter/attention  Hypersensitivity strategies education  Multi-modal environment  Sustained/alternating/divided attention  Tracking tube  Oculomotor control:  saccades, con/divergence  Conv./div. Dynamic tasks  Work stations with timed transitions  Temporal Awareness  Memory and mental manipulation  Auditory processing with immediate recall  Memory retention with immediate and delayed recall  Edu on cog/vision apps

## 2025-07-07 ENCOUNTER — APPOINTMENT (OUTPATIENT)
Facility: CLINIC | Age: 75
End: 2025-07-07
Payer: MEDICARE

## 2025-07-07 ENCOUNTER — APPOINTMENT (OUTPATIENT)
Facility: CLINIC | Age: 75
End: 2025-07-07
Attending: FAMILY MEDICINE
Payer: MEDICARE

## 2025-07-09 ENCOUNTER — APPOINTMENT (OUTPATIENT)
Facility: CLINIC | Age: 75
End: 2025-07-09
Attending: FAMILY MEDICINE
Payer: MEDICARE

## 2025-07-09 ENCOUNTER — APPOINTMENT (OUTPATIENT)
Facility: CLINIC | Age: 75
End: 2025-07-09
Payer: MEDICARE

## 2025-07-14 ENCOUNTER — APPOINTMENT (OUTPATIENT)
Facility: CLINIC | Age: 75
End: 2025-07-14
Attending: FAMILY MEDICINE
Payer: MEDICARE

## 2025-07-14 ENCOUNTER — APPOINTMENT (OUTPATIENT)
Facility: CLINIC | Age: 75
End: 2025-07-14
Payer: MEDICARE

## 2025-07-16 ENCOUNTER — APPOINTMENT (OUTPATIENT)
Facility: CLINIC | Age: 75
End: 2025-07-16
Attending: FAMILY MEDICINE
Payer: MEDICARE

## 2025-07-16 ENCOUNTER — OFFICE VISIT (OUTPATIENT)
Facility: CLINIC | Age: 75
End: 2025-07-16
Payer: MEDICARE

## 2025-07-16 DIAGNOSIS — R49.9 UNSPECIFIED VOICE AND RESONANCE DISORDER: ICD-10-CM

## 2025-07-16 DIAGNOSIS — G20.A1 PARKINSON'S DISEASE, UNSPECIFIED WHETHER DYSKINESIA PRESENT, UNSPECIFIED WHETHER MANIFESTATIONS FLUCTUATE (HCC): Primary | ICD-10-CM

## 2025-07-16 PROCEDURE — 92507 TX SP LANG VOICE COMM INDIV: CPT

## 2025-07-16 NOTE — PROGRESS NOTES
"Speech Treatment Note    Today's date: 2025  Patient name: Femi Acosta  : 1950  MRN: [id number]  Referring provider: Apolinar Carr MD  Dx:   Encounter Diagnosis     ICD-10-CM    1. Parkinson's disease, unspecified whether dyskinesia present, unspecified whether manifestations fluctuate (HCC)  G20.A1       2. Unspecified voice and resonance disorder  R49.9                    Visit Number: 3    Subjective/Behavioral: Attended session with spouse. Remained motivated and engaged throughout session. Pt w/ overt s/s aspiration following sips of thin liquids. He and his wife report ongoing difficulty/concerns w/ swallowing. Will complete formal dysphagia evaluation next session.    Goal 1: Patient will complete maximum phonation time exercises with spl of approximately 80 to 85 dB for 15-20 seconds, to be achieved in 6-8 weeks.   Completed x7.     79 dB SPL @ 18.4 sec  78 dB SPL @ 15.3 sec  80 dB SPL @ 15.5 sec  79 DB SPL @ 18.4 sec   78 dB SPL @ 16.3 sec  78 dB SPL @ 17.3 sec  79 DB SPL @ 19 sec      Goal 2: Patient will complete high and low pitch glides with spl of approximately 75 to 80 dB, to be achieved in 6-8 weeks.   Did not target.      Goal 3: Patient will read words and phrases with spl of approximately 80 to 85 dB, to be achieved in 6-8 weeks .   Phrases were read with an average loudness of 82 dB. Required min cues for \"intent\"- shows good ability to self correct after cueing.      Goal 4: Patient will read sentences/paragraphs of increasing length and complexity with spl of approximately 80 to 85 dB, to be achieved in 6-8 weeks.  Did not target.      Goal 5: Patient will engage in conversational speech with ability to self-monitor adequate loudness at spl of approximately 75 to 80 dB, to be achieved Brendan participated in structured conversation-based activity. During this task, he was asked to name photos (This is a _____) and then spell the word. He was able to maintain functional loudness " (77-70 dB) given min cues for intent. Overall, he demonstrated good carryover of loudness into this structured activity.      Goal 6. Patient will participate in further assessment of swallow function, to be achieved in 6-8 weeks.   Did not target.    Other:Patient's family member was present was present during today's session. and Patient was provided with home exercises/ activies to target goals in plan of care.  Recommendations:Continue with Plan of Care

## 2025-07-18 ENCOUNTER — TELEPHONE (OUTPATIENT)
Age: 75
End: 2025-07-18

## 2025-07-18 NOTE — TELEPHONE ENCOUNTER
Wife calling stating  has swelling in his LE with no mechanism of injury or pain in the ankle.  She is stating possibly Venous insufficiency.  Advised reach out to PCP as he can advise her who she should see.  Wife disconnected call.

## 2025-07-21 ENCOUNTER — OFFICE VISIT (OUTPATIENT)
Facility: CLINIC | Age: 75
End: 2025-07-21
Payer: MEDICARE

## 2025-07-21 ENCOUNTER — OFFICE VISIT (OUTPATIENT)
Facility: CLINIC | Age: 75
End: 2025-07-21
Attending: FAMILY MEDICINE
Payer: MEDICARE

## 2025-07-21 DIAGNOSIS — R26.9 GAIT ABNORMALITY: ICD-10-CM

## 2025-07-21 DIAGNOSIS — R26.89 IMBALANCE: ICD-10-CM

## 2025-07-21 DIAGNOSIS — R29.6 FALLS: ICD-10-CM

## 2025-07-21 DIAGNOSIS — G20.B1 PARKINSON'S DISEASE WITH DYSKINESIA WITHOUT FLUCTUATING MANIFESTATIONS (HCC): Primary | ICD-10-CM

## 2025-07-21 DIAGNOSIS — G20.A1 PARKINSON'S DISEASE, UNSPECIFIED WHETHER DYSKINESIA PRESENT, UNSPECIFIED WHETHER MANIFESTATIONS FLUCTUATE (HCC): Primary | ICD-10-CM

## 2025-07-21 PROCEDURE — 97112 NEUROMUSCULAR REEDUCATION: CPT

## 2025-07-21 PROCEDURE — 97530 THERAPEUTIC ACTIVITIES: CPT

## 2025-07-21 NOTE — PROGRESS NOTES
"Daily Note     Today's date: 2025  Patient name: Femi Acosta  : 1950  MRN: [id number]  Referring provider: Apolinar Carr MD  Dx:   Encounter Diagnosis   Name Primary?    Parkinson's disease with dyskinesia without fluctuating manifestations (HCC) Yes                 Start Time: 1015  Stop Time: 1100  Total time in clinic (min): 45 minutes    PLAN OF CARE START: 2025  PLAN OF CARE END: 2025  FREQUENCY: 2-3x/week  Precautions: FALL SAFETY, decreased insight to deficits, cog decline    Subjective: Pt discussed his difficulty with \"medium term memory\" with therapist today, including the strain it puts on his relationship with his wife due to frustrations with his \"memory lapses\"      Objective: See treatment below.      TA  - discussion and EDU provided to Pt on memory strategies to incorporate into daily routines including utilizing a memory journal and lists to organize thoughts, important information, and daily activities  - Completed game of concentration with x5 card pairs face down on table to work on working memory, sustained attention, with EDU on completing this at home as part of cognitive HEP    NM  - spot it cards completed to work on working memory, visual memory,  skills, x5 cards       Assessment: Tolerated treatment well. Pt appearing frustrated about memory lapses and benefited from EDU this session on memory strategies including use of memory journal to keep track of thoughts and daily activities, plan to check in on progress with this next session, incorporate into session activities if needed. Pt demonstrating poor immediate memory during concentration game. Pt demonstrating slow processing speed, bradykinesia, decreased insight into cognitive deficits.  Pt would benefit from continued OT services to address functional cognition, endurance, LE strength, FMC and dexterity.      Plan: Continued skilled OT per POC.    "

## 2025-07-21 NOTE — PROGRESS NOTES
Daily Note     Today's date: 2025  Patient name: Femi Acosta  : 1950  MRN: [id number]  Referring provider: Apolinar Carr MD  Dx:   Encounter Diagnosis     ICD-10-CM    1. Parkinson's disease, unspecified whether dyskinesia present, unspecified whether manifestations fluctuate ([facility])  G20.A1       2. Imbalance  R26.89       3. Falls  R29.6       4. Gait abnormality  R26.9           Start Time: 930  Stop Time: 1015  Total time in clinic (min): 45 minutes    Subjective: Patient reports no new complaints today.      Objective: See treatment diary below      Assessment: Tolerated treatment well and with increased difficulty side stepping to the left with resistance, with pt losing balance several times requiring assistance to stabilize. Pt instructed to keep wider ISMAEL and take large steps, however had difficulty with eccentric control of resistance. Pt was challenged by Blazepods activity, maintained good balance however occasionally had difficulty finding the lit pod. Patient demonstrated fatigue post treatment, exhibited good technique with therapeutic exercises, and would benefit from continued PT      Plan: Continue per plan of care.  Progress treatment as tolerated.         Outcome Measures Initial Eval  2025 PN  3/5/25 PN  25 PN  25 PN  25    5xSTS 15.3 sec 13.85 sec 11.09 sec 12.43 sec 13.5 sec    TUG  - Regular  - Cognitive  - Carry   9.1 sec  9.6 sec  9.4 sec   7.69 sec  8.72 sec  8.59 sec   7.62 sec  7.07 sec  7.35 sec   7.25 sec  8.19 sec  8.06 sec   7.57 sec  8.75 sec  8.59 sec    MiniBEST /28        10 meter 1.28 m/s 1.23 m/s 1.41 m/s 1.46 m/s 1.37 m/s    6MWT 1535 ft 1655.8 ft 1643 ft 1593.2 ft 1541 ft    ABC 68.75% 69.38% 67.5% 70% 65.63%    PDQ-39 /100        MDS-UPDRS  Part III   /128        H&Y Stage         Floor > Stand  sec 29.56 sec 12.97 sec 11.25 sec 37.09 sec               Daily Exercise Log:  Start of Care: 25  POC expires  25    Date 25  "6/30/25 7/21/25     Visit # 22 23 24 25     Insurance Auth BOMN        Auth Expires                   Manual                                             Neuro Re-Ed  38' 40' 38'     Agility ladder  Ins/outs 3 laps  Sidestep 3 laps progressed to side shuffle Ins/outs 4 laps, cues for seq  Sidestep w/ tidal tank 4 laps      STS w/ tidal tank  10# 2x15 10# 2x1' (15 reps,14 reps) 15x w/ 10# TT     Fwd/retro walk  6 laps  8 laps w/ 10# TT     Step-ups    20x alternating step ups to Bosu, 1 UE support     Pt education  Reviewed inc activity at home, shifting attention, dividing energy, starting and stopping activities        rocker board   A/P & M/L max cues req for wt shift and use of ankle movements especially. Poor wt shift to L side noted, with pt able to complete with man facilitation 2 min R/L, 2 min fwd/bk, inc ability to shift to L LE, inc difficulty shifting back into heels     Resisted walking    5 laps side step w/ single 6\" luis stepover, 8#     Side steps on airex mat    2 mins     Blazepods    3 x 1 min rounds, 2 pods on floor 2 pods on counter behind pt (14, 14, 17 hits)              Ther Ex                                                                                 Ther Activity                           Gait Training                           Modalities                           HEP:   Access Code: [email or web address]  URL: [email or web address]  Date: 03/19/2025  Prepared by: Reji Sandoval Jr.     Exercises  - Sit to Stand  - 1 x daily - 7 x weekly - 3 sets - 10 reps  - Step Up  - 1 x daily - 7 x weekly - 3 sets - 10 reps  - Lateral Step Up  - 1 x daily - 7 x weekly - 3 sets - 10 reps  - Standing March with Counter Support  - 1 x daily - 7 x weekly - 3 sets - 10 reps  - Side Stepping with Counter Support  - 1 x daily - 7 x weekly - 3 sets - 10 reps       "

## 2025-07-23 ENCOUNTER — OFFICE VISIT (OUTPATIENT)
Facility: CLINIC | Age: 75
End: 2025-07-23
Attending: FAMILY MEDICINE
Payer: MEDICARE

## 2025-07-23 ENCOUNTER — OFFICE VISIT (OUTPATIENT)
Facility: CLINIC | Age: 75
End: 2025-07-23
Payer: MEDICARE

## 2025-07-23 DIAGNOSIS — R26.89 IMBALANCE: ICD-10-CM

## 2025-07-23 DIAGNOSIS — G20.A1 PARKINSON'S DISEASE, UNSPECIFIED WHETHER DYSKINESIA PRESENT, UNSPECIFIED WHETHER MANIFESTATIONS FLUCTUATE (HCC): Primary | ICD-10-CM

## 2025-07-23 DIAGNOSIS — R26.9 GAIT ABNORMALITY: ICD-10-CM

## 2025-07-23 DIAGNOSIS — R29.6 FALLS: ICD-10-CM

## 2025-07-23 DIAGNOSIS — G20.B1 PARKINSON'S DISEASE WITH DYSKINESIA WITHOUT FLUCTUATING MANIFESTATIONS (HCC): Primary | ICD-10-CM

## 2025-07-23 DIAGNOSIS — R49.9 UNSPECIFIED VOICE AND RESONANCE DISORDER: ICD-10-CM

## 2025-07-23 PROCEDURE — 92610 EVALUATE SWALLOWING FUNCTION: CPT

## 2025-07-23 PROCEDURE — 97112 NEUROMUSCULAR REEDUCATION: CPT

## 2025-07-23 PROCEDURE — 97110 THERAPEUTIC EXERCISES: CPT

## 2025-07-23 PROCEDURE — 97530 THERAPEUTIC ACTIVITIES: CPT

## 2025-07-23 NOTE — PROGRESS NOTES
Daily Note     Today's date: 2025  Patient name: Femi Acosta  : 1950  MRN: [id number]  Referring provider: Apolinar Carr MD  Dx:   Encounter Diagnosis     ICD-10-CM    1. Parkinson's disease, unspecified whether dyskinesia present, unspecified whether manifestations fluctuate ([facility])  G20.A1       2. Imbalance  R26.89       3. Falls  R29.6       4. Gait abnormality  R26.9           Start Time: 930  Stop Time: 1015  Total time in clinic (min): 45 minutes    Subjective: Patient's wife notes she notices that patient's posture has gotten worse, he leans forward and to one side.      Objective: See treatment diary below      Assessment: Tolerated treatment well and with several losses of balance requiring assistance to stabilize during large amplitude steps to river TranSiC. Patient had decreased eccentric control bringing Romel weight back in walking forwards. Patient reported he sometimes hallucinates where he sees dogs or cats. Patient demonstrated fatigue post treatment, exhibited good technique with therapeutic exercises, and would benefit from continued PT      Plan: Continue per plan of care.  Progress treatment as tolerated.         Outcome Measures Initial Eval  2025 PN  3/5/25 PN  25 PN  25 PN  25    5xSTS 15.3 sec 13.85 sec 11.09 sec 12.43 sec 13.5 sec    TUG  - Regular  - Cognitive  - Carry   9.1 sec  9.6 sec  9.4 sec   7.69 sec  8.72 sec  8.59 sec   7.62 sec  7.07 sec  7.35 sec   7.25 sec  8.19 sec  8.06 sec   7.57 sec  8.75 sec  8.59 sec    MiniBEST /28        10 meter 1.28 m/s 1.23 m/s 1.41 m/s 1.46 m/s 1.37 m/s    6MWT 1535 ft 1655.8 ft 1643 ft 1593.2 ft 1541 ft    ABC 68.75% 69.38% 67.5% 70% 65.63%    PDQ-39 /100        MDS-UPDRS  Part III   /128        H&Y Stage         Floor > Stand  sec 29.56 sec 12.97 sec 11.25 sec 37.09 sec               Daily Exercise Log:  Start of Care: 25  POC expires  25    Date 25    Visit # 22 23 24 25  "26    Insurance Auth [id number]        Auth Expires                   Manual                                             Neuro Re-Ed  38' 40' 38' 30'    Agility ladder  Ins/outs 3 laps  Sidestep 3 laps progressed to side shuffle Ins/outs 4 laps, cues for seq  Sidestep w/ tidal tank 4 laps      STS w/ tidal tank  10# 2x15 10# 2x1' (15 reps,14 reps) 15x w/ 10# TT     Fwd/retro walk  6 laps  8 laps w/ 10# TT     Step-ups    20x alternating step ups to Bosu, 1 UE support     Pt education  Reviewed inc activity at home, shifting attention, dividing energy, starting and stopping activities        [email or web address]   A/P & M/L max cues req for wt shift and use of ankle movements especially. Poor wt shift to L side noted, with pt able to complete with man facilitation 2 min R/L, 2 min fwd/bk, inc ability to shift to L LE, inc difficulty shifting back into heels     Resisted walking    5 laps side step w/ single 6\" luis stepover, 8# 10 laps retro walk 8#, pt with dec control walking fwd towards Romel    Side steps on airex mat    2 mins W/ Blazepods    Blazepods    3 x 1 min rounds, 2 pods on floor 2 pods on counter behind pt (14, 14, 17 hits) 4 x 30 sec rounds, side stepping on foam, 3 distracting pods (6, 10, 8, 9 hits), dec reaction time, cueing to remember to side step to pod    Large amplitude movements     STS 15x w/ 2# DB  Fwd step 10x ea onto eBrisk Video w/ 2# DB             Ther Ex     8'    Ball rollout     10 x 10\" hold    Hamstring stretch     2 x 20\" ea    Calf stretch     2 x 20\" ea    Nustep     Tabata 15\" on 45\" off                                        Ther Activity                           Gait Training                           Modalities                           HEP:   Access Code: [email or web address]  URL: [email or web address]  Date: 03/19/2025  Prepared by: Reji Sandoval Jr.     Exercises  - Sit to Stand  - 1 x daily - 7 x weekly - 3 sets - 10 reps  - Step Up  - 1 x daily - 7 x weekly - 3 sets - 10 " reps  - Lateral Step Up  - 1 x daily - 7 x weekly - 3 sets - 10 reps  - Standing March with Counter Support  - 1 x daily - 7 x weekly - 3 sets - 10 reps  - Side Stepping with Counter Support  - 1 x daily - 7 x weekly - 3 sets - 10 reps

## 2025-07-23 NOTE — PROGRESS NOTES
"Daily Note     Today's date: 2025  Patient name: Femi Acosta  : 1950  MRN: [id number]  Referring provider: Apolinar Carr MD  Dx:   Encounter Diagnosis   Name Primary?    Parkinson's disease with dyskinesia without fluctuating manifestations ([facility]) Yes                   Start Time: 1015  Stop Time: 1100  Total time in clinic (min): 45 minutes    PLAN OF CARE START: 2025  PLAN OF CARE END: 2025  FREQUENCY: 2-3x/week  Precautions: FALL SAFETY, decreased insight to deficits, cog decline    Subjective: Pt discussed his difficulty with \"medium term memory\" with therapist today, including the strain it puts on his relationship with his wife due to frustrations with his \"memory lapses\"      Objective: See treatment below.      TA  - completed connect the dots activity in ascending numerical order to work on sustained attention, working memory, sequencing,  skils      NM  - pt completed squigz activity placing as many as possible onto the whiteboard in 2 minutes while tapping blaze pods to work on divided attention, FMC, improving bradykinesia and increasing speed of movement. Completed x20 finger flicks between each trial to emphasize large amplitude movements   - trial 1: 5 squigz, 6 hits   - trial 2: 8 squigz, 7 hits   - trial 3: 7 squigz, 7 hits      Assessment: Tolerated treatment well. Pt demonstrating bradykinesia during squigz activity however improvements noted after finger flicks with verbal cues for \"big hands.\" Increased time required for connect the dots activity but completed this with only 1 verbal cue to recognize an error. Pt demonstrating slow processing speed, bradykinesia, decreased insight into cognitive deficits.  Pt would benefit from continued OT services to address functional cognition, endurance, LE strength, FMC and dexterity.      Plan: Continued skilled OT per POC.    "

## 2025-07-23 NOTE — PROGRESS NOTES
"Speech Treatment Note/ Dysphagia Evaluation    Today's date: 2025  Patient name: Femi Acosta  : 1950  MRN: [id number]  Referring provider: Apolinar Carr MD  Dx:   Encounter Diagnosis     ICD-10-CM    1. Parkinson's disease, unspecified whether dyskinesia present, unspecified whether manifestations fluctuate ([facility])  G20.A1       2. Unspecified voice and resonance disorder  R49.9                      Visit Number: 4    Subjective/Behavioral: Attended session unaccompanied. Remained motivated and engaged throughout session. Dysphagia evaluation completed during today's session.     Impressions:  Patient with generally functional oropharyngeal swallow skills with the materials administered today. He accepted 8 oz thin liquids via cup (single+consecutive cup sips), diced peaches in thin liquids, and austyn doone cookies. Mastication was mildly prolonged but generally WNL. Swallow judged to be timely with adequate HLE. Patient endorses occasional coughing with liquids. He also endorses intermittent globus sensation/pharyngeal retention. Provided education on small bites, small sips, and \"effortful\" swallows. Also recommended using a double swallow/liquid wash. Patient with Parkinsons and difficulty with fine motor skills for cutting- spoke with wife Zaynab at end of session and recommended she cut his foot into small bite-sized pieces. Also encouraged soft/moist solids. Encouraged carry over of strategies during all mealtimes plus mindfulness when eating (small bites, small sips, slow rate of intake, chewing thoroughly before swallowing). Patient and spouse to bring larger meal into session for continued diagnostic assessment. Can consider video swallow study if problems continue/worsen. Provided education on s/s aspiration. They decline any recent pneumonias. Will continue to monitor.    Goal 1: Patient will complete maximum phonation time exercises with spl of approximately 80 to 85 dB for 15-20 seconds, to " be achieved in 6-8 weeks.   Did not target.      Goal 2: Patient will complete high and low pitch glides with spl of approximately 75 to 80 dB, to be achieved in 6-8 weeks.   Did not target.      Goal 3: Patient will read words and phrases with spl of approximately 80 to 85 dB, to be achieved in 6-8 weeks .   Did not target.     Goal 4: Patient will read sentences/paragraphs of increasing length and complexity with spl of approximately 80 to 85 dB, to be achieved in 6-8 weeks.  Did not target.      Goal 5: Patient will engage in conversational speech with ability to self-monitor adequate loudness at spl of approximately 75 to 80 dB, to be achieved Brendan Did not target.     Goal 6. Patient will participate in further assessment of swallow function, to be achieved in 6-8 weeks. -- goal met 7/23    Dysphagia Evaluation:    -Reason for referral: Signs/symptoms of dysphagia    -Subjective report of swallowing difficulty: Coughing, Choking, and Globus sensation     -Difficulty swallowing: Solids and Liquids    -Current diet (solids): Regular  -Current diet (liquids): Thin  -Current pill intake method: One at a time with water.   -Alternative Feeding Method?: No    -Facial appearance Symmetrical   -Mandible function Adequate ROM   -Dentition Adequate   -Labial function WFL   -Lingual function WFL   -Velar function Unable to visualize   -Oral apraxia? Absent   -Vocal quality Hoarse- (Parkinson's)   -Volitional cough Strong/productive   -Respiration WFL   -Drooling? No   -Tremor/involuntary movement? Not present     LIQUID CONSISTENCY TESTING:   Item(s) tested: (Thin) - water change as needed    Administered by: Cup and Self-fed    Clinical findings:  -Oral phase impairments: N/A, patient WFL  -Pharyngeal phase impairments: N/A, patient WFL    Other notes: rapid rate of P.O. intake    Strategies, attempts, and responses: small bites, small sips, multiple swallow, and effortful swallow      SOLID CONSISTENCY TESTING:  Item(s)  "tested: (Regular, Mechanical Soft, and Mixed) - Cathy Doone cookie, diced peaches in thin liquid juice  Administered by: Self-fed    Clinical findings:  -Oral phase impairments: prolonged mastication  -Pharyngeal phase impairments: N/A, patient WFL    Other notes: rapid rate of P.O. intake    Strategies, attempts, and responses: small bites, small sips, liquid wash, multiple swallow, and effortful swallow      -Factors affecting performance: Following directions, Compliance, and Other carryover of strategies in the setting of parkinsons's    -Safety concerns: Risk for aspiration    -Risk factors: Progressive neurological disease      SWALLOWING SAFETY PRECAUTIONS:  -Recommended solids: Mechanical Soft    -Recommended liquids: Thin    -Recommended medication form: One at a time with liquid    -Frequent/thorough oral care 2x daily    -Aspiration precautions   *Monitor for signs/symptoms concerning for aspiration (e.g., low grade fever, increase in WBC, change in chest x-ray, increased congestion, increased coughing with P.O. intake)    -Strategies: Small sips and bites when eating, Slow rate, swallow between bites, and Alternate liquids and solids    -Positioning: Upright position during meals    -Compensatory strategies: Effortful swallow    -Supervision: Other: supervision for verbal reminders to use strategies    -Referrals: None        Impressions:  Patient with generally functional oropharyngeal swallow skills with the materials administered today. He accepted 8 oz thin liquids via cup (single+consecutive cup sips), diced peaches in thin liquids, and cathy doone cookies. Mastication was mildly prolonged but generally WNL. Swallow judged to be timely with adequate HLE. Patient endorses occasional coughing with liquids. He also endorses intermittent globus sensation/pharyngeal retention. Provided education on small bites, small sips, and \"effortful\" swallows. Also recommended using a double swallow/liquid wash. " Patient with Parkinsons and difficulty with fine motor skills for cutting- spoke with wife Zaynab at end of session and recommended she cut his foot into small bite-sized pieces. Also encouraged soft/moist solids. Encouraged carry over of strategies during all mealtimes plus mindfulness when eating (small bites, small sips, slow rate of intake, chewing thoroughly before swallowing). Patient and spouse to bring larger meal into session for continued diagnostic assessment. Can consider video swallow study if problems continue/worsen. Provided education on s/s aspiration. They decline any recent pneumonias. Will continue to monitor.    Other:Patient's family member was present was present during today's session. and Patient was provided with home exercises/ activies to target goals in plan of care.  Recommendations:Continue with Plan of Care

## 2025-07-25 ENCOUNTER — 3 MONTH FOLLOW UP (OUTPATIENT)
Dept: URBAN - METROPOLITAN AREA CLINIC 6 | Facility: CLINIC | Age: 75
End: 2025-07-25

## 2025-07-25 DIAGNOSIS — H25.813: ICD-10-CM

## 2025-07-25 DIAGNOSIS — H16.223: ICD-10-CM

## 2025-07-25 DIAGNOSIS — H40.012: ICD-10-CM

## 2025-07-25 DIAGNOSIS — H04.123: ICD-10-CM

## 2025-07-25 PROCEDURE — 92012 INTRM OPH EXAM EST PATIENT: CPT

## 2025-07-25 ASSESSMENT — VISUAL ACUITY
OD_CC: 20/30-2
OS_CC: 20/100-1
OS_PH: 20/70-1

## 2025-07-25 ASSESSMENT — KERATOMETRY
OS_AXISANGLE_DEGREES: 50
OD_K1POWER_DIOPTERS: 43.25
OD_AXISANGLE2_DEGREES: 47
OD_AXISANGLE_DEGREES: 137
OS_K2POWER_DIOPTERS: 46.50
OD_K2POWER_DIOPTERS: 47.00
OS_K1POWER_DIOPTERS: 43.50
OS_AXISANGLE2_DEGREES: 140

## 2025-07-25 ASSESSMENT — TONOMETRY
OS_IOP_MMHG: 12
OD_IOP_MMHG: 10

## 2025-07-28 ENCOUNTER — OFFICE VISIT (OUTPATIENT)
Facility: CLINIC | Age: 75
End: 2025-07-28
Payer: MEDICARE

## 2025-07-28 ENCOUNTER — OFFICE VISIT (OUTPATIENT)
Facility: CLINIC | Age: 75
End: 2025-07-28
Attending: FAMILY MEDICINE
Payer: MEDICARE

## 2025-07-28 DIAGNOSIS — G20.B1 PARKINSON'S DISEASE WITH DYSKINESIA WITHOUT FLUCTUATING MANIFESTATIONS (HCC): Primary | ICD-10-CM

## 2025-07-28 DIAGNOSIS — R49.9 UNSPECIFIED VOICE AND RESONANCE DISORDER: ICD-10-CM

## 2025-07-28 DIAGNOSIS — G20.A1 PARKINSON'S DISEASE, UNSPECIFIED WHETHER DYSKINESIA PRESENT, UNSPECIFIED WHETHER MANIFESTATIONS FLUCTUATE (HCC): Primary | ICD-10-CM

## 2025-07-28 DIAGNOSIS — R26.9 GAIT ABNORMALITY: ICD-10-CM

## 2025-07-28 DIAGNOSIS — R29.6 FALLS: ICD-10-CM

## 2025-07-28 DIAGNOSIS — R26.89 IMBALANCE: ICD-10-CM

## 2025-07-28 PROCEDURE — 92507 TX SP LANG VOICE COMM INDIV: CPT

## 2025-07-28 PROCEDURE — 97112 NEUROMUSCULAR REEDUCATION: CPT

## 2025-07-28 PROCEDURE — 97530 THERAPEUTIC ACTIVITIES: CPT

## 2025-07-29 ENCOUNTER — TELEPHONE (OUTPATIENT)
Age: 75
End: 2025-07-29

## 2025-07-30 ENCOUNTER — OFFICE VISIT (OUTPATIENT)
Facility: CLINIC | Age: 75
End: 2025-07-30
Payer: MEDICARE

## 2025-07-30 DIAGNOSIS — G20.B1 PARKINSON'S DISEASE WITH DYSKINESIA WITHOUT FLUCTUATING MANIFESTATIONS (HCC): Primary | ICD-10-CM

## 2025-07-30 DIAGNOSIS — R49.9 UNSPECIFIED VOICE AND RESONANCE DISORDER: ICD-10-CM

## 2025-07-30 DIAGNOSIS — G20.A1 PARKINSON'S DISEASE, UNSPECIFIED WHETHER DYSKINESIA PRESENT, UNSPECIFIED WHETHER MANIFESTATIONS FLUCTUATE (HCC): Primary | ICD-10-CM

## 2025-07-30 PROCEDURE — 97112 NEUROMUSCULAR REEDUCATION: CPT

## 2025-07-30 PROCEDURE — 97110 THERAPEUTIC EXERCISES: CPT

## 2025-07-30 PROCEDURE — 92507 TX SP LANG VOICE COMM INDIV: CPT

## 2025-07-30 PROCEDURE — 97530 THERAPEUTIC ACTIVITIES: CPT

## 2025-07-31 ENCOUNTER — TELEPHONE (OUTPATIENT)
Dept: GASTROENTEROLOGY | Facility: MEDICAL CENTER | Age: 75
End: 2025-07-31

## 2025-08-04 ENCOUNTER — OFFICE VISIT (OUTPATIENT)
Facility: CLINIC | Age: 75
End: 2025-08-04
Payer: MEDICARE

## 2025-08-04 ENCOUNTER — OFFICE VISIT (OUTPATIENT)
Facility: CLINIC | Age: 75
End: 2025-08-04
Attending: FAMILY MEDICINE
Payer: MEDICARE

## 2025-08-04 DIAGNOSIS — R26.89 IMBALANCE: ICD-10-CM

## 2025-08-04 DIAGNOSIS — R29.6 FALLS: ICD-10-CM

## 2025-08-04 DIAGNOSIS — G20.B1 PARKINSON'S DISEASE WITH DYSKINESIA WITHOUT FLUCTUATING MANIFESTATIONS (HCC): Primary | ICD-10-CM

## 2025-08-04 DIAGNOSIS — G20.A1 PARKINSON'S DISEASE, UNSPECIFIED WHETHER DYSKINESIA PRESENT, UNSPECIFIED WHETHER MANIFESTATIONS FLUCTUATE (HCC): Primary | ICD-10-CM

## 2025-08-04 DIAGNOSIS — R26.9 GAIT ABNORMALITY: ICD-10-CM

## 2025-08-04 DIAGNOSIS — R49.9 UNSPECIFIED VOICE AND RESONANCE DISORDER: ICD-10-CM

## 2025-08-04 PROCEDURE — 92507 TX SP LANG VOICE COMM INDIV: CPT

## 2025-08-04 PROCEDURE — 97530 THERAPEUTIC ACTIVITIES: CPT

## 2025-08-04 PROCEDURE — 97112 NEUROMUSCULAR REEDUCATION: CPT

## 2025-08-04 PROCEDURE — 97110 THERAPEUTIC EXERCISES: CPT

## 2025-08-05 ENCOUNTER — OFFICE VISIT (OUTPATIENT)
Age: 75
End: 2025-08-05
Payer: MEDICARE

## 2025-08-05 VITALS — BODY MASS INDEX: 25.84 KG/M2 | WEIGHT: 195 LBS | HEIGHT: 73 IN

## 2025-08-05 DIAGNOSIS — M77.42 METATARSALGIA OF LEFT FOOT: ICD-10-CM

## 2025-08-05 DIAGNOSIS — I87.2 VENOUS INSUFFICIENCY OF BOTH LOWER EXTREMITIES: Primary | ICD-10-CM

## 2025-08-05 DIAGNOSIS — B35.3 TINEA PEDIS OF BOTH FEET: ICD-10-CM

## 2025-08-05 DIAGNOSIS — L84 CALLUS OF FOOT: ICD-10-CM

## 2025-08-05 PROCEDURE — G2211 COMPLEX E/M VISIT ADD ON: HCPCS | Performed by: STUDENT IN AN ORGANIZED HEALTH CARE EDUCATION/TRAINING PROGRAM

## 2025-08-05 PROCEDURE — 11055 PARING/CUTG B9 HYPRKER LES 1: CPT | Performed by: STUDENT IN AN ORGANIZED HEALTH CARE EDUCATION/TRAINING PROGRAM

## 2025-08-05 PROCEDURE — 99204 OFFICE O/P NEW MOD 45 MIN: CPT | Performed by: STUDENT IN AN ORGANIZED HEALTH CARE EDUCATION/TRAINING PROGRAM

## 2025-08-06 ENCOUNTER — OFFICE VISIT (OUTPATIENT)
Facility: CLINIC | Age: 75
End: 2025-08-06
Payer: MEDICARE

## 2025-08-06 ENCOUNTER — EVALUATION (OUTPATIENT)
Facility: CLINIC | Age: 75
End: 2025-08-06
Payer: MEDICARE

## 2025-08-06 ENCOUNTER — OFFICE VISIT (OUTPATIENT)
Facility: CLINIC | Age: 75
End: 2025-08-06
Attending: FAMILY MEDICINE
Payer: MEDICARE

## 2025-08-06 DIAGNOSIS — R49.9 UNSPECIFIED VOICE AND RESONANCE DISORDER: ICD-10-CM

## 2025-08-06 DIAGNOSIS — R26.9 GAIT ABNORMALITY: ICD-10-CM

## 2025-08-06 DIAGNOSIS — R29.6 FALLS: ICD-10-CM

## 2025-08-06 DIAGNOSIS — G20.A1 PARKINSON'S DISEASE, UNSPECIFIED WHETHER DYSKINESIA PRESENT, UNSPECIFIED WHETHER MANIFESTATIONS FLUCTUATE (HCC): Primary | ICD-10-CM

## 2025-08-06 DIAGNOSIS — R26.89 IMBALANCE: ICD-10-CM

## 2025-08-06 DIAGNOSIS — G20.B1 PARKINSON'S DISEASE WITH DYSKINESIA WITHOUT FLUCTUATING MANIFESTATIONS (HCC): Primary | ICD-10-CM

## 2025-08-06 PROCEDURE — 97530 THERAPEUTIC ACTIVITIES: CPT

## 2025-08-06 PROCEDURE — 92507 TX SP LANG VOICE COMM INDIV: CPT

## 2025-08-06 PROCEDURE — 97112 NEUROMUSCULAR REEDUCATION: CPT

## 2025-08-06 PROCEDURE — 97110 THERAPEUTIC EXERCISES: CPT

## 2025-08-11 ENCOUNTER — OFFICE VISIT (OUTPATIENT)
Facility: CLINIC | Age: 75
End: 2025-08-11
Attending: FAMILY MEDICINE
Payer: MEDICARE

## 2025-08-11 ENCOUNTER — OFFICE VISIT (OUTPATIENT)
Facility: CLINIC | Age: 75
End: 2025-08-11
Payer: MEDICARE

## 2025-08-13 ENCOUNTER — OFFICE VISIT (OUTPATIENT)
Facility: CLINIC | Age: 75
End: 2025-08-13
Payer: MEDICARE

## 2025-08-13 ENCOUNTER — OFFICE VISIT (OUTPATIENT)
Facility: CLINIC | Age: 75
End: 2025-08-13
Attending: FAMILY MEDICINE
Payer: MEDICARE

## 2025-08-18 ENCOUNTER — OFFICE VISIT (OUTPATIENT)
Facility: CLINIC | Age: 75
End: 2025-08-18
Payer: MEDICARE

## 2025-08-18 ENCOUNTER — OFFICE VISIT (OUTPATIENT)
Facility: CLINIC | Age: 75
End: 2025-08-18
Attending: FAMILY MEDICINE
Payer: MEDICARE

## 2025-08-18 DIAGNOSIS — R29.6 FALLS: ICD-10-CM

## 2025-08-18 DIAGNOSIS — R49.9 UNSPECIFIED VOICE AND RESONANCE DISORDER: Primary | ICD-10-CM

## 2025-08-18 DIAGNOSIS — R26.9 GAIT ABNORMALITY: ICD-10-CM

## 2025-08-18 DIAGNOSIS — G20.A1 PARKINSON'S DISEASE, UNSPECIFIED WHETHER DYSKINESIA PRESENT, UNSPECIFIED WHETHER MANIFESTATIONS FLUCTUATE (HCC): Primary | ICD-10-CM

## 2025-08-18 DIAGNOSIS — R26.89 IMBALANCE: ICD-10-CM

## 2025-08-18 DIAGNOSIS — G20.B1 PARKINSON'S DISEASE WITH DYSKINESIA WITHOUT FLUCTUATING MANIFESTATIONS (HCC): Primary | ICD-10-CM

## 2025-08-18 DIAGNOSIS — G20.A1 PARKINSON'S DISEASE, UNSPECIFIED WHETHER DYSKINESIA PRESENT, UNSPECIFIED WHETHER MANIFESTATIONS FLUCTUATE (HCC): ICD-10-CM

## 2025-08-18 PROCEDURE — 92507 TX SP LANG VOICE COMM INDIV: CPT

## 2025-08-18 PROCEDURE — 97535 SELF CARE MNGMENT TRAINING: CPT

## 2025-08-18 PROCEDURE — 97112 NEUROMUSCULAR REEDUCATION: CPT

## 2025-08-20 ENCOUNTER — OFFICE VISIT (OUTPATIENT)
Facility: CLINIC | Age: 75
End: 2025-08-20
Payer: MEDICARE

## 2025-08-20 ENCOUNTER — OFFICE VISIT (OUTPATIENT)
Facility: CLINIC | Age: 75
End: 2025-08-20
Attending: FAMILY MEDICINE
Payer: MEDICARE

## 2025-08-20 ENCOUNTER — EVALUATION (OUTPATIENT)
Facility: CLINIC | Age: 75
End: 2025-08-20
Payer: MEDICARE

## 2025-08-20 DIAGNOSIS — G20.A1 PARKINSON'S DISEASE, UNSPECIFIED WHETHER DYSKINESIA PRESENT, UNSPECIFIED WHETHER MANIFESTATIONS FLUCTUATE (HCC): ICD-10-CM

## 2025-08-20 DIAGNOSIS — R26.89 IMBALANCE: ICD-10-CM

## 2025-08-20 DIAGNOSIS — G20.A1 PARKINSON'S DISEASE, UNSPECIFIED WHETHER DYSKINESIA PRESENT, UNSPECIFIED WHETHER MANIFESTATIONS FLUCTUATE (HCC): Primary | ICD-10-CM

## 2025-08-20 DIAGNOSIS — G20.B1 PARKINSON'S DISEASE WITH DYSKINESIA WITHOUT FLUCTUATING MANIFESTATIONS (HCC): Primary | ICD-10-CM

## 2025-08-20 DIAGNOSIS — R29.6 FALLS: ICD-10-CM

## 2025-08-20 DIAGNOSIS — R49.9 UNSPECIFIED VOICE AND RESONANCE DISORDER: Primary | ICD-10-CM

## 2025-08-20 DIAGNOSIS — R26.9 GAIT ABNORMALITY: ICD-10-CM

## 2025-08-20 PROCEDURE — 97530 THERAPEUTIC ACTIVITIES: CPT

## 2025-08-20 PROCEDURE — 97112 NEUROMUSCULAR REEDUCATION: CPT

## 2025-08-20 PROCEDURE — 92524 BEHAVRAL QUALIT ANALYS VOICE: CPT
